# Patient Record
Sex: FEMALE | Race: BLACK OR AFRICAN AMERICAN | NOT HISPANIC OR LATINO | Employment: UNEMPLOYED | URBAN - METROPOLITAN AREA
[De-identification: names, ages, dates, MRNs, and addresses within clinical notes are randomized per-mention and may not be internally consistent; named-entity substitution may affect disease eponyms.]

---

## 2017-01-04 ENCOUNTER — ROUTINE PRENATAL (OUTPATIENT)
Dept: OBGYN | Facility: CLINIC | Age: 37
End: 2017-01-04
Payer: MEDICAID

## 2017-01-04 ENCOUNTER — APPOINTMENT (OUTPATIENT)
Dept: RADIOLOGY | Facility: IMAGING CENTER | Age: 37
End: 2017-01-04
Attending: NURSE PRACTITIONER
Payer: MEDICAID

## 2017-01-04 VITALS — BODY MASS INDEX: 38.68 KG/M2 | WEIGHT: 247 LBS | SYSTOLIC BLOOD PRESSURE: 116 MMHG | DIASTOLIC BLOOD PRESSURE: 58 MMHG

## 2017-01-04 DIAGNOSIS — Z34.82 ENCOUNTER FOR SUPERVISION OF OTHER NORMAL PREGNANCY IN SECOND TRIMESTER: ICD-10-CM

## 2017-01-04 DIAGNOSIS — Z34.01 ENCOUNTER FOR SUPERVISION OF NORMAL FIRST PREGNANCY IN FIRST TRIMESTER: ICD-10-CM

## 2017-01-04 PROCEDURE — 76805 OB US >/= 14 WKS SNGL FETUS: CPT | Mod: 26 | Performed by: OBSTETRICS & GYNECOLOGY

## 2017-01-04 PROCEDURE — 90040 PR PRENATAL FOLLOW UP: CPT | Performed by: OBSTETRICS & GYNECOLOGY

## 2017-01-04 NOTE — MR AVS SNAPSHOT
Annabel Archibald    2017 10:00 AM   Routine Prenatal   MRN: [id number]    [facility]   Dept Phone:  [phone]    Description:  Female : 1980   Provider:  Jen Grant M.D.           Allergies as of 2017     Allergen Noted Reactions    Peanut-Derived 2016         You were diagnosed with     Encounter for supervision of other normal pregnancy in second trimester   [[id number]]         Vital Signs     Blood Pressure Weight Last Menstrual Period Smoking Status          116/58 mmHg 112.038 kg (247 lb) 08/15/2016 Former Smoker        Basic Information     Date Of Birth Sex Race Ethnicity Preferred Language    1980 Female Black or  Non- English      Problem List              ICD-10-CM Priority Class Noted - Resolved    Supervision of high risk pregnancy, antepartum O09.90   2016 - Present    History of gestational diabetes in prior pregnancy, currently pregnant O09.299, Z86.32   2016 - Present      Health Maintenance        Date Due Completion Dates    IMM DTaP/Tdap/Td Vaccine (1 - Tdap) 1999 ---    IMM INFLUENZA (1) 2016 ---    PAP SMEAR 2019            Current Immunizations     No immunizations on file.      Below and/or attached are the medications your provider expects you to take. Review all of your home medications and newly ordered medications with your provider and/or pharmacist. Follow medication instructions as directed by your provider and/or pharmacist. Please keep your medication list with you and share with your provider. Update the information when medications are discontinued, doses are changed, or new medications (including over-the-counter products) are added; and carry medication information at all times in the event of emergency situations     Allergies:  PEANUT-DERIVED - (reactions not documented)               Medications  Valid as of: 2017 - 10:43 AM    Generic Name  Brand Name Tablet Size Instructions for use    Acetaminophen   Take  by mouth.        Doxylamine-Pyridoxine (Tablet Delayed Response) Doxylamine-Pyridoxine 10-10 MG Take 1 Tab by mouth 4 times a day as needed (nausea).        Oxycodone-Acetaminophen (Tab) PERCOCET 5-325 MG Take 1-2 Tabs by mouth every 6 hours as needed for Moderate Pain (pain).        .                 Medicines prescribed today were sent to:     [facility] [address] [place] [address] [place] - [address] [facility]    [address] [facility] [address]    Phone: [phone] Fax: [phone]    Open 24 Hours?: No      Medication refill instructions:       If your prescription bottle indicates you have medication refills left, it is not necessary to call your provider’s office. Please contact your pharmacy and they will refill your medication.    If your prescription bottle indicates you do not have any refills left, you may request refills at any time through one of the following ways: The online Dealer Ignition system (except Urgent Care), by calling your provider’s office, or by asking your pharmacy to contact your provider’s office with a refill request. Medication refills are processed only during regular business hours and may not be available until the next business day. Your provider may request additional information or to have a follow-up visit with you prior to refilling your medication.   *Please Note: Medication refills are assigned a new Rx number when refilled electronically. Your pharmacy may indicate that no refills were authorized even though a new prescription for the same medication is available at the pharmacy. Please request the medicine by name with the pharmacy before contacting your provider for a refill.           Dealer Ignition Access Code: [email or web address]  Expires: 1/5/2017  6:09 PM    Dealer Ignition  A secure, online tool to manage your health information     [employer]’s Dealer Ignition® is a secure, online tool that connects  you to your personalized health information from the privacy of your home -- day or night - making it very easy for you to manage your healthcare. Once the activation process is completed, you can even access your medical information using the SOHM tejas, which is available for free in the Apple Tejas store or Google Play store.     SOHM provides the following levels of access (as shown below):   My Chart Features   [facility] Doctor [facility]  Specialists [facility]  Urgent  Care Non-[facility]  Primary Care  Doctor   Email your healthcare team securely and privately 24/7 X X X    Manage appointments: schedule your next appointment; view details of past/upcoming appointments X      Request prescription refills. X      View recent personal medical records, including lab and immunizations X X X X   View health record, including health history, allergies, medications X X X X   Read reports about your outpatient visits, procedures, consult and ER notes X X X X   See your discharge summary, which is a recap of your hospital and/or ER visit that includes your diagnosis, lab results, and care plan. X X       How to register for SOHM:  1. Go to  [email or web address].  2. Click on the Sign Up Now box, which takes you to the New Member Sign Up page. You will need to provide the following information:  a. Enter your SOHM Access Code exactly as it appears at the top of this page. (You will not need to use this code after you’ve completed the sign-up process. If you do not sign up before the expiration date, you must request a new code.)   b. Enter your date of birth.   c. Enter your home email address.   d. Click Submit, and follow the next screen’s instructions.  3. Create a SOHM ID. This will be your SOHM login ID and cannot be changed, so think of one that is secure and easy to remember.  4. Create a SOHM password. You can change your password at any time.  5. Enter your Password Reset Question and  Answer. This can be used at a later time if you forget your password.   6. Enter your e-mail address. This allows you to receive e-mail notifications when new information is available in [employer].  7. Click Sign Up. You can now view your health information.    For assistance activating your [employer] account, call [phone]

## 2017-01-04 NOTE — PROGRESS NOTES
Pt here today for OB follow up  Denies FM  WT: 247 lb  BP: 116/58  Declines AFP  Pt states having lower back pain. States no other complaints.  Good # [phone]

## 2017-01-04 NOTE — PROGRESS NOTES
Annabel Bethea is a 36 y.o.  at 20w2d here today for obstetrical visit.  Patient is without complaints.    She reports good fetal movement.  She denies vaginal bleeding.  She denies rupture of membranes.  She denies contractions.     has Supervision of high risk pregnancy, antepartum and History of gestational diabetes in prior pregnancy, currently pregnant on her problem list.    Patient is complaining of continued problems with her back, she has seen neurosurgery and now has seen a second neurosurgeon who states they will perform back surgery on her the day that she has her baby  A told her that was probably unlikely but we will get the records from her new neurosurgery group [place]    A/P IUP at 20w2d  AFP declined  1 hour glucola   Rhogam b pos  GBS     F/U in 4 weeks

## 2017-01-17 ENCOUNTER — TELEPHONE (OUTPATIENT)
Dept: OBGYN | Facility: CLINIC | Age: 37
End: 2017-01-17

## 2017-01-17 NOTE — TELEPHONE ENCOUNTER
"Pt called stating she is having \"unbareable back pain\" due to her herniated discs and she is missing a lot of time from work so she would like to be put on disability so she does not lose her job. She says that her back doctor told her he will not do it until she has the surgery and he cannot do the surgery until after she delivers so he  sent her to her primary care to see if he would put her on disability but he told her to see if we will do it because she is pregnant. I consulted with Kandi Montaño and she said that the best we can do is schedule her follow up with an MD and see if they will do it. I called patient back and scheduled her with an MD.  "

## 2017-01-31 ENCOUNTER — ROUTINE PRENATAL (OUTPATIENT)
Dept: OBGYN | Facility: CLINIC | Age: 37
End: 2017-01-31
Payer: MEDICAID

## 2017-01-31 VITALS — BODY MASS INDEX: 38.05 KG/M2 | SYSTOLIC BLOOD PRESSURE: 122 MMHG | WEIGHT: 243 LBS | DIASTOLIC BLOOD PRESSURE: 76 MMHG

## 2017-01-31 NOTE — MR AVS SNAPSHOT
Annabel Bethea   2017 4:00 PM   Routine Prenatal   MRN: [id number]    [facility]   Dept Phone:  [phone]    Description:  Female : 1980   Provider:  Suze Ibrahim M.D.           Allergies as of 2017     Allergen Noted Reactions    Peanut-Derived 2016         Vital Signs     Blood Pressure Weight Last Menstrual Period Smoking Status          122/76 mmHg 110.224 kg (243 lb) 08/15/2016 Former Smoker        Basic Information     Date Of Birth Sex Race Ethnicity Preferred Language    1980 Female Black or  Non- English      Your appointments     2017  4:00 PM   OB Follow Up with Suze Ibrahim M.D.   [facility] [address]  [facility] [id number] [phone]              Problem List              ICD-10-CM Priority Class Noted - Resolved    Supervision of high risk pregnancy, antepartum O09.90   2016 - Present    History of gestational diabetes in prior pregnancy, currently pregnant O09.299, Z86.32   2016 - Present      Health Maintenance        Date Due Completion Dates    IMM DTaP/Tdap/Td Vaccine (1 - Tdap) 1999 ---    IMM INFLUENZA (1) 2016 ---    PAP SMEAR 2019            Current Immunizations     No immunizations on file.      Below and/or attached are the medications your provider expects you to take. Review all of your home medications and newly ordered medications with your provider and/or pharmacist. Follow medication instructions as directed by your provider and/or pharmacist. Please keep your medication list with you and share with your provider. Update the information when medications are discontinued, doses are changed, or new medications (including over-the-counter products) are added; and carry medication information at all times in the event of emergency situations     Allergies:  PEANUT-DERIVED - (reactions not  documented)               Medications  Valid as of: January 31, 2017 -  3:54 PM    Generic Name Brand Name Tablet Size Instructions for use    Acetaminophen   Take  by mouth.        Doxylamine-Pyridoxine (Tablet Delayed Response) Doxylamine-Pyridoxine 10-10 MG Take 1 Tab by mouth 4 times a day as needed (nausea).        Oxycodone-Acetaminophen (Tab) PERCOCET 5-325 MG Take 1-2 Tabs by mouth every 6 hours as needed for Moderate Pain (pain).        .                 Medicines prescribed today were sent to:     [facility] [address] [facility], [place] - [address] [facility]    [address] [facility] [place] [address]    Phone: [phone] Fax: [phone]    Open 24 Hours?: No      Medication refill instructions:       If your prescription bottle indicates you have medication refills left, it is not necessary to call your provider’s office. Please contact your pharmacy and they will refill your medication.    If your prescription bottle indicates you do not have any refills left, you may request refills at any time through one of the following ways: The online [employer] system (except Urgent Care), by calling your provider’s office, or by asking your pharmacy to contact your provider’s office with a refill request. Medication refills are processed only during regular business hours and may not be available until the next business day. Your provider may request additional information or to have a follow-up visit with you prior to refilling your medication.   *Please Note: Medication refills are assigned a new Rx number when refilled electronically. Your pharmacy may indicate that no refills were authorized even though a new prescription for the same medication is available at the pharmacy. Please request the medicine by name with the pharmacy before contacting your provider for a refill.           [employer] Access Code: [email or web address]  Expires: 3/2/2017  3:05 PM    [employer]  A secure, online tool to  manage your health information     [employer]’s Cogeco Cable® is a secure, online tool that connects you to your personalized health information from the privacy of your home -- day or night - making it very easy for you to manage your healthcare. Once the activation process is completed, you can even access your medical information using the Cogeco Cable tejas, which is available for free in the Apple Tejas store or Google Play store.     Cogeco [employer] provides the following levels of access (as shown below):   My Chart Features   [facility] Doctor [facility] [place]  Specialists [facility]  Urgent  Care Non-[facility]  Primary Care  Doctor   Email your healthcare team securely and privately 24/7 X X X    Manage appointments: schedule your next appointment; view details of past/upcoming appointments X      Request prescription refills. X      View recent personal medical records, including lab and immunizations X X X X   View health record, including health history, allergies, medications X X X X   Read reports about your outpatient visits, procedures, consult and ER notes X X X X   See your discharge summary, which is a recap of your hospital and/or ER visit that includes your diagnosis, lab results, and care plan. X X       How to register for [employer]:  1. Go to  [email or web address].  2. Click on the Sign Up Now box, which takes you to the New Member Sign Up page. You will need to provide the following information:  a. Enter your Cog[employer]o [employer] Access Code exactly as it appears at the top of this page. (You will not need to use this code after you’ve completed the sign-up process. If you do not sign up before the expiration date, you must request a new code.)   b. Enter your date of birth.   c. Enter your home email address.   d. Click Submit, and follow the next screen’s instructions.  3. Create a Cog[employer]o Cable ID. This will be your Cog[employer] login ID and cannot be changed, so think of one that is secure and easy to remember.  4. Create a MotorwayBuddyt  password. You can change your password at any time.  5. Enter your Password Reset Question and Answer. This can be used at a later time if you forget your password.   6. Enter your e-mail address. This allows you to receive e-mail notifications when new information is available in [employer].  7. Click Sign Up. You can now view your health information.    For assistance activating your Sk[employer]Bridge account, call [phone]

## 2017-02-01 ENCOUNTER — TELEPHONE (OUTPATIENT)
Dept: OBGYN | Facility: CLINIC | Age: 37
End: 2017-02-01

## 2017-02-01 NOTE — TELEPHONE ENCOUNTER
[facility]   CLINIC: [facility]  CALL TYPE: General Office Message  TO: /Office  NM: Annabel Bethea   PH: [phone]   PT NM: Annabel Bethea   : 80   REG DR:    RE: Patient is 6 months pregnant, and  has been experiencing a lot of  vomiting. She was put through to the  [facility] NurseUmBios Hotline.   DISP HIST: 2017 04:39P [facility]  [facility] cllr to [facility] BlogBuss Hotline    --------------------------------------  Message History  Account: [id number]  Taken:  2017  4:39p [facility]  Serial#: [id number]  Delivered To:                           ===========[phone]=================    17 1051 Left message for pt to call back if she has more questions.

## 2017-02-06 ENCOUNTER — TELEPHONE (OUTPATIENT)
Dept: OBGYN | Facility: CLINIC | Age: 37
End: 2017-02-06

## 2017-02-06 NOTE — TELEPHONE ENCOUNTER
"Pt called stating that she is in a lot of pain because of her \"herniated dics\" and her neurologist took her off steroids because it can harm the baby and she is already taking Norco and tylenol asking what can she take for pain. I consulted with Nay Coyle midwife and she said to tell pt that we are not going to give her different medications than her neurologist has given her. I called pt to let her know and got her VM left an message for pt to call back if she still had questions.  "

## 2017-02-07 ENCOUNTER — HOSPITAL ENCOUNTER (EMERGENCY)
Facility: MEDICAL CENTER | Age: 37
End: 2017-02-07
Attending: EMERGENCY MEDICINE
Payer: MEDICAID

## 2017-02-07 ENCOUNTER — APPOINTMENT (OUTPATIENT)
Dept: RADIOLOGY | Facility: MEDICAL CENTER | Age: 37
End: 2017-02-07
Attending: EMERGENCY MEDICINE
Payer: MEDICAID

## 2017-02-07 VITALS
HEART RATE: 92 BPM | SYSTOLIC BLOOD PRESSURE: 116 MMHG | RESPIRATION RATE: 20 BRPM | DIASTOLIC BLOOD PRESSURE: 90 MMHG | WEIGHT: 244.49 LBS | BODY MASS INDEX: 38.37 KG/M2 | HEIGHT: 67 IN

## 2017-02-07 DIAGNOSIS — Z34.90 PREGNANCY, UNSPECIFIED GESTATIONAL AGE: ICD-10-CM

## 2017-02-07 DIAGNOSIS — R11.2 NON-INTRACTABLE VOMITING WITH NAUSEA, UNSPECIFIED VOMITING TYPE: ICD-10-CM

## 2017-02-07 DIAGNOSIS — F41.9 ANXIETY: ICD-10-CM

## 2017-02-07 PROCEDURE — 700111 HCHG RX REV CODE 636 W/ 250 OVERRIDE (IP): Performed by: EMERGENCY MEDICINE

## 2017-02-07 PROCEDURE — 99283 EMERGENCY DEPT VISIT LOW MDM: CPT

## 2017-02-07 PROCEDURE — 76815 OB US LIMITED FETUS(S): CPT

## 2017-02-07 RX ORDER — ONDANSETRON 4 MG/1
4 TABLET, FILM COATED ORAL EVERY 4 HOURS PRN
Qty: 20 TAB | Refills: 0 | Status: ON HOLD | OUTPATIENT
Start: 2017-02-07 | End: 2017-05-15

## 2017-02-07 RX ORDER — BISACODYL 5 MG/1
10 TABLET, DELAYED RELEASE ORAL PRN
Qty: 30 TAB | Refills: 0 | Status: ON HOLD | OUTPATIENT
Start: 2017-02-07 | End: 2017-05-15

## 2017-02-07 RX ORDER — ONDANSETRON 4 MG/1
4 TABLET, ORALLY DISINTEGRATING ORAL ONCE
Status: COMPLETED | OUTPATIENT
Start: 2017-02-07 | End: 2017-02-07

## 2017-02-07 RX ADMIN — ONDANSETRON 4 MG: 4 TABLET, ORALLY DISINTEGRATING ORAL at 14:13

## 2017-02-07 ASSESSMENT — PAIN SCALES - WONG BAKER: WONGBAKER_NUMERICALRESPONSE: DOESN'T HURT AT ALL

## 2017-02-07 NOTE — DISCHARGE INSTRUCTIONS
ABCs of Pregnancy  A  Antepartum care is very important. Be sure you see your doctor and get prenatal care as soon as you think you are pregnant. At this time, you will be tested for infection, genetic abnormalities and potential problems with you and the pregnancy. This is the time to discuss diet, exercise, work, medications, labor, pain medication during labor and the possibility of a  delivery. Ask any questions that may concern you. It is important to see your doctor regularly throughout your pregnancy. Avoid exposure to toxic substances and chemicals - such as cleaning solvents, lead and mercury, some insecticides, and paint. Pregnant women should avoid exposure to paint fumes, and fumes that cause you to feel ill, dizzy or faint. When possible, it is a good idea to have a pre-pregnancy consultation with your caregiver to begin some important recommendations your caregiver suggests such as, taking folic acid, exercising, quitting smoking, avoiding alcoholic beverages, etc.  B  Breastfeeding is the healthiest choice for both you and your baby. It has many nutritional benefits for the baby and health benefits for the mother. It also creates a very tight and loving bond between the baby and mother. Talk to your doctor, your family and friends, and your employer about how you choose to feed your baby and how they can support you in your decision. Not all birth defects can be prevented, but a woman can take actions that may increase her chance of having a healthy baby. Many birth defects happen very early in pregnancy, sometimes before a woman even knows she is pregnant. Birth defects or abnormalities of any child in your or the father's family should be discussed with your caregiver. Get a good support bra as your breast size changes. Wear it especially when you exercise and when nursing.   C  Celebrate the news of your pregnancy with the your spouse/father and family. Childbirth classes are helpful to  take for you and the spouse/father because it helps to understand what happens during the pregnancy, labor and delivery.  delivery should be discussed with your doctor so you are prepared for that possibility. The pros and cons of circumcision if it is a boy, should be discussed with your pediatrician. Cigarette smoking during pregnancy can result in low birth weight babies. It has been associated with infertility, miscarriages, tubal pregnancies, infant death (mortality) and poor health (morbidity) in childhood. Additionally, cigarette smoking may cause long-term learning disabilities. If you smoke, you should try to quit before getting pregnant and not smoke during the pregnancy. Secondary smoke may also harm a mother and her developing baby. It is a good idea to ask people to stop smoking around you during your pregnancy and after the baby is born. Extra calcium is necessary when you are pregnant and is found in your prenatal vitamin, in dairy products, green leafy vegetables and in calcium supplements.  D  A healthy diet according to your current weight and height, along with vitamins and mineral supplements should be discussed with your caregiver. Domestic abuse or violence should be made known to your doctor right away to get the situation corrected. Drink more water when you exercise to keep hydrated. Discomfort of your back and legs usually develops and progresses from the middle of the second trimester through to delivery of the baby. This is because of the enlarging baby and uterus, which may also affect your balance. Do not take illegal drugs. Illegal drugs can seriously harm the baby and you. Drink extra fluids (water is best) throughout pregnancy to help your body keep up with the increases in your blood volume. Drink at least 6 to 8 glasses of water, fruit juice, or milk each day. A good way to know you are drinking enough fluid is when your urine looks almost like clear water or is very light  yellow.   E  Eat healthy to get the nutrients you and your unborn baby need. Your meals should include the five basic food groups. Exercise (30 minutes of light to moderate exercise a day) is important and encouraged during pregnancy, if there are no medical problems or problems with the pregnancy. Exercise that causes discomfort or dizziness should be stopped and reported to your caregiver. Emotions during pregnancy can change from being ecstatic to depression and should be understood by you, your partner and your family.  F  Fetal screening with ultrasound, amniocentesis and monitoring during pregnancy and labor is common and sometimes necessary. Take 400 micrograms of folic acid daily both before, when possible, and during the first few months of pregnancy to reduce the risk of birth defects of the brain and spine. All women who could possibly become pregnant should take a vitamin with folic acid, every day. It is also important to eat a healthy diet with fortified foods (enriched grain products, including cereals, rice, breads, and pastas) and foods with natural sources of folate (orange juice, green leafy vegetables, beans, peanuts, broccoli, asparagus, peas, and lentils). The father should be involved with all aspects of the pregnancy including, the prenatal care, childbirth classes, labor, delivery, and postpartum time. Fathers may also have emotional concerns about being a father, financial needs, and raising a family.  G  Genetic testing should be done appropriately. It is important to know your family and the father's history. If there have been problems with pregnancies or birth defects in your family, report these to your doctor. Also, genetic counselors can talk with you about the information you might need in making decisions about having a family. You can call a major medical center in your area for help in finding a board-certified genetic counselor. Genetic testing and counseling should be done  before pregnancy when possible, especially if there is a history of problems in the mother's or father's family. Certain ethnic backgrounds are more at risk for genetic defects.  H  Get familiar with the hospital where you will be having your baby. Get to know how long it takes to get there, the labor and delivery area, and the hospital procedures. Be sure your medical insurance is accepted there. Get your home ready for the baby including, clothes, the baby's room (when possible), furniture and car seat. Hand washing is important throughout the day, especially after handling raw meat and poultry, changing the baby's diaper or using the bathroom. This can help prevent the spread of many bacteria and viruses that cause infection. Your hair may become dry and thinner, but will return to normal a few weeks after the baby is born. Heartburn is a common problem that can be treated by taking antacids recommended by your caregiver, eating smaller meals 5 or 6 times a day, not drinking liquids when eating, drinking between meals and raising the head of your bed 2 to 3 inches.  I  Insurance to cover you, the baby, doctor and hospital should be reviewed so that you will be prepared to pay any costs not covered by your insurance plan. If you do not have medical insurance, there are usually clinics and services available for you in your community. Take 30 milligrams of iron during your pregnancy as prescribed by your doctor to reduce the risk of low red blood cells (anemia) later in pregnancy. All women of childbearing age should eat a diet rich in iron.  J  There should be a joint effort for the mother, father and any other children to adapt to the pregnancy financially, emotionally, and psychologically during the pregnancy. Join a support group for moms-to-be. Or, join a class on parenting or childbirth. Have the family participate when possible.  K  Know your limits. Let your caregiver know if you experience any of the  following:   · Pain of any kind.  · Strong cramps.  · You develop a lot of weight in a short period of time (5 pounds in 3 to 5 days).  · Vaginal bleeding, leaking of amniotic fluid.  · Headache, vision problems.  · Dizziness, fainting, shortness of breath.  · Chest pain.  · Fever of 102° F (38.9° C) or higher.  · Gush of clear fluid from your vagina.  · Painful urination.  · Domestic violence.  · Irregular heartbeat (palpitations).  · Rapid beating of the heart (tachycardia).  · Constant feeling sick to your stomach (nauseous) and vomiting.  · Trouble walking, fluid retention (edema).  · Muscle weakness.  · If your baby has decreased activity.  · Persistent diarrhea.  · Abnormal vaginal discharge.  · Uterine contractions at 20-minute intervals.  · Back pain that travels down your leg.  L  Learn and practice that what you eat and drink should be in moderation and healthy for you and your baby. Legal drugs such as alcohol and caffeine are important issues for pregnant women. There is no safe amount of alcohol a woman can drink while pregnant. Fetal alcohol syndrome, a disorder characterized by growth retardation, facial abnormalities, and central nervous system dysfunction, is caused by a woman's use of alcohol during pregnancy. Caffeine, found in tea, coffee, soft drinks and chocolate, should also be limited. Be sure to read labels when trying to cut down on caffeine during pregnancy. More than 200 foods, beverages, and over-the-counter medications contain caffeine and have a high salt content! There are coffees and teas that do not contain caffeine.  M  Medical conditions such as diabetes, epilepsy, and high blood pressure should be treated and kept under control before pregnancy when possible, but especially during pregnancy. Ask your caregiver about any medications that may need to be changed or adjusted during pregnancy. If you are currently taking any medications, ask your caregiver if it is safe to take them  while you are pregnant or before getting pregnant when possible. Also, be sure to discuss any herbs or vitamins you are taking. They are medicines, too! Discuss with your doctor all medications, prescribed and over-the-counter, that you are taking. During your prenatal visit, discuss the medications your doctor may give you during labor and delivery.  N  Never be afraid to ask your doctor or caregiver questions about your health, the progress of the pregnancy, family problems, stressful situations, and recommendation for a pediatrician, if you do not have one. It is better to take all precautions and discuss any questions or concerns you may have during your office visits. It is a good idea to write down your questions before you visit the doctor.  O  Over-the-counter cough and cold remedies may contain alcohol or other ingredients that should be avoided during pregnancy. Ask your caregiver about prescription, herbs or over-the-counter medications that you are taking or may consider taking while pregnant.   P  Physical activity during pregnancy can benefit both you and your baby by lessening discomfort and fatigue, providing a sense of well-being, and increasing the likelihood of early recovery after delivery. Light to moderate exercise during pregnancy strengthens the belly (abdominal) and back muscles. This helps improve posture. Practicing yoga, walking, swimming, and cycling on a stationary bicycle are usually safe exercises for pregnant women. Avoid scuba diving, exercise at high altitudes (over 3000 feet), skiing, horseback riding, contact sports, etc. Always check with your doctor before beginning any kind of exercise, especially during pregnancy and especially if you did not exercise before getting pregnant.  Q  Queasiness, stomach upset and morning sickness are common during pregnancy. Eating a couple of crackers or dry toast before getting out of bed. Foods that you normally love may make you feel sick to  your stomach. You may need to substitute other nutritious foods. Eating 5 or 6 small meals a day instead of 3 large ones may make you feel better. Do not drink with your meals, drink between meals. Questions that you have should be written down and asked during your prenatal visits.  R  Read about and make plans to baby-proof your home. There are important tips for making your home a safer environment for your baby. Review the tips and make your home safer for you and your baby. Read food labels regarding calories, salt and fat content in the food.  S  Saunas, hot tubs, and steam rooms should be avoided while you are pregnant. Excessive high heat may be harmful during your pregnancy. Your caregiver will screen and examine you for sexually transmitted diseases and genetic disorders during your prenatal visits. Learn the signs of labor. Sexual relations while pregnant is safe unless there is a medical or pregnancy problem and your caregiver advises against it.  T  Traveling long distances should be avoided especially in the third trimester of your pregnancy. If you do have to travel out of state, be sure to take a copy of your medical records and medical insurance plan with you. You should not travel long distances without seeing your doctor first. Most airlines will not allow you to travel after 36 weeks of pregnancy. Toxoplasmosis is an infection caused by a parasite that can seriously harm an unborn baby. Avoid eating undercooked meat and handling cat litter. Be sure to wear gloves when gardening. Tingling of the hands and fingers is not unusual and is due to fluid retention. This will go away after the baby is born.  U  Womb (uterus) size increases during the first trimester. Your kidneys will begin to function more efficiently. This may cause you to feel the need to urinate more often. You may also leak urine when sneezing, coughing or laughing. This is due to the growing uterus pressing against your bladder,  which lies directly in front of and slightly under the uterus during the first few months of pregnancy. If you experience burning along with frequency of urination or bloody urine, be sure to tell your doctor. The size of your uterus in the third trimester may cause a problem with your balance. It is advisable to maintain good posture and avoid wearing high heels during this time. An ultrasound of your baby may be necessary during your pregnancy and is safe for you and your baby.  V  Vaccinations are an important concern for pregnant women. Get needed vaccines before pregnancy. Center for Disease Control (www.cdc.gov) has clear guidelines for the use of vaccines during pregnancy. Review the list, be sure to discuss it with your doctor. Prenatal vitamins are helpful and healthy for you and the baby. Do not take extra vitamins except what is recommended. Taking too much of certain vitamins can cause overdose problems. Continuous vomiting should be reported to your caregiver. Varicose veins may appear especially if there is a family history of varicose veins. They should subside after the delivery of the baby. Support hose helps if there is leg discomfort.  W  Being overweight or underweight during pregnancy may cause problems. Try to get within 15 pounds of your ideal weight before pregnancy. Remember, pregnancy is not a time to be dieting! Do not stop eating or start skipping meals as your weight increases. Both you and your baby need the calories and nutrition you receive from a healthy diet. Be sure to consult with your doctor about your diet. There is a formula and diet plan available depending on whether you are overweight or underweight. Your caregiver or nutritionist can help and advise you if necessary.  X  Avoid X-rays. If you must have dental work or diagnostic tests, tell your dentist or physician that you are pregnant so that extra care can be taken. X-rays should only be taken when the risks of not taking  them outweigh the risk of taking them. If needed, only the minimum amount of radiation should be used. When X-rays are necessary, protective lead shields should be used to cover areas of the body that are not being X-rayed.  Y  Your baby loves you. Breastfeeding your baby creates a loving and very close bond between the two of you. Give your baby a healthy environment to live in while you are pregnant. Infants and children require constant care and guidance. Their health and safety should be carefully watched at all times. After the baby is born, rest or take a nap when the baby is sleeping.  Z  Get your ZZZs. Be sure to get plenty of rest. Resting on your side as often as possible, especially on your left side is advised. It provides the best circulation to your baby and helps reduce swelling. Try taking a nap for 30 to 45 minutes in the afternoon when possible. After the baby is born rest or take a nap when the baby is sleeping. Try elevating your feet for that amount of time when possible. It helps the circulation in your legs and helps reduce swelling.   Most information courtesy of the CDC.  Document Released: 12/18/2006 Document Revised: 03/11/2013 Document Reviewed: 09/01/2010  [employer]® Patient Information ©2014 [employer].    Eating Plan for Pregnant Women  While you are pregnant, your body will require additional nutrition to help support your growing baby. It is recommended that you consume:  · 150 additional calories each day during your first trimester.  · 300 additional calories each day during your second trimester.  · 300 additional calories each day during your third trimester.  Eating a healthy, well-balanced diet is very important for your health and for your baby's health. You also have a higher need for some vitamins and minerals, such as folic acid, calcium, iron, and vitamin D.  WHAT DO I NEED TO KNOW ABOUT EATING DURING PREGNANCY?  · Do not try to lose weight or go on a diet during  "pregnancy.  · Choose healthy, nutritious foods. Choose ½ of a sandwich with a glass of milk instead of a candy bar or a high-calorie sugar-sweetened beverage.  · Limit your overall intake of foods that have \"empty calories.\" These are foods that have little nutritional value, such as sweets, desserts, candies, sugar-sweetened beverages, and fried foods.  · Eat a variety of foods, especially fruits and vegetables.  · Take a prenatal vitamin to help meet the additional needs during pregnancy, specifically for folic acid, iron, calcium, and vitamin D.  · Remember to stay active. Ask your health care provider for exercise recommendations that are specific to you.  · Practice good food safety and cleanliness, such as washing your hands before you eat and after you prepare raw meat. This helps to prevent foodborne illnesses, such as listeriosis, that can be very dangerous for your baby. Ask your health care provider for more information about listeriosis.  WHAT DOES 150 EXTRA CALORIES LOOK LIKE?  Healthy options for an additional 150 calories each day could be any of the following:  · Plain low-fat yogurt (6-8 oz) with ½ cup of berries.  · 1 apple with 2 teaspoons of peanut butter.  · Cut-up vegetables with ¼ cup of hummus.  · Low-fat chocolate milk (8 oz or 1 cup).  · 1 string cheese with 1 medium orange.  · ½ of a peanut butter and jelly sandwich on whole-wheat bread (1 tsp of peanut butter).  For 300 calories, you could eat two of those healthy options each day.   WHAT IS A HEALTHY AMOUNT OF WEIGHT TO GAIN?  The recommended amount of weight for you to gain is based on your pre-pregnancy BMI. If your pre-pregnancy BMI was:  · Less than 18 (underweight), you should gain 28-40 lb.  · 18-24.9 (normal), you should gain 25-35 lb.  · 25-29.9 (overweight), you should gain 15-25 lb.  · Greater than 30 (obese), you should gain 11-20 lb.  WHAT IF I AM HAVING TWINS OR MULTIPLES?  Generally, pregnant women who will be having twins " "or multiples may need to increase their daily calories by 300-600 calories each day. The recommended range for total weight gain is 25-54 lb, depending on your pre-pregnancy BMI. Talk with your health care provider for specific guidance about additional nutritional needs, weight gain, and exercise during your pregnancy.  WHAT FOODS CAN I EAT?  Grains  Any grains. Try to choose whole grains, such as whole-wheat bread, oatmeal, or brown rice.  Vegetables  Any vegetables. Try to eat a variety of colors and types of vegetables to get a full range of vitamins and minerals. Remember to wash your vegetables well before eating.  Fruits  Any fruits. Try to eat a variety of colors and types of fruit to get a full range of vitamins and minerals. Remember to wash your fruits well before eating.  Meats and Other Protein Sources  Lean meats, including chicken, turkey, fish, and lean cuts of beef, veal, or pork. Make sure that all meats are cooked to \"well done.\" Tofu. Tempeh. Beans. Eggs. Peanut butter and other nut butters. Seafood, such as shrimp, crab, and lobster. If you choose fish, select types that are higher in omega-3 fatty acids, including salmon, herring, mussels, trout, sardines, and pollock. Make sure that all meats are cooked to food-safe temperatures.  Dairy  Pasteurized milk and milk alternatives. Pasteurized yogurt and pasteurized cheese. Cottage cheese. Sour cream.  Beverages  Water. Juices that contain 100% fruit juice or vegetable juice. Caffeine-free teas and decaffeinated coffee. Drinks that contain caffeine are okay to drink, but it is better to avoid caffeine. Keep your total caffeine intake to less than 200 mg each day (12 oz of coffee, tea, or soda) or as directed by your health care provider.  Condiments  Any pasteurized condiments.  Sweets and Desserts  Any sweets and desserts.  Fats and Oils  Any fats and oils.  The items listed above may not be a complete list of recommended foods or beverages. " Contact your dietitian for more options.  WHAT FOODS ARE NOT RECOMMENDED?  Vegetables  Unpasteurized (raw) vegetable juices.  Fruits  Unpasteurized (raw) fruit juices.  Meats and Other Protein Sources  Cured meats that have nitrates, such as hernandez, salami, and hotdogs. Luncheon meats, bologna, or other deli meats (unless they are reheated until they are steaming hot). Refrigerated [place], meat spreads from a meat counter, smoked seafood that is found in the refrigerated section of a store. Raw fish, such as sushi or sashimi. High mercury content fish, such as tilefish, shark, swordfish, and jose mackerel. Raw meats, such as tuna or beef tartare. Undercooked meats and poultry. Make sure that all meats are cooked to food-safe temperatures.  Dairy  Unpasteurized (raw) milk and any foods that have raw milk in them. Soft cheeses, such as feta, queso guadalupe, queso fresco, Brie, Camembert cheeses, blue-veined cheeses, and Panela cheese (unless it is made with pasteurized milk, which must be stated on the label).  Beverages  Alcohol. Sugar-sweetened beverages, such as sodas, teas, or energy drinks.  Condiments  Homemade fermented foods and drinks, such as pickles, sauerkraut, or kombucha drinks. (Store-bought pasteurized versions of these are okay.)  Other  Salads that are made in the store, such as ham salad, chicken salad, egg salad, tuna salad, and seafood salad.  The items listed above may not be a complete list of foods and beverages to avoid. Contact your dietitian for more information.     This information is not intended to replace advice given to you by your health care provider. Make sure you discuss any questions you have with your health care provider.     Document Released: 10/02/2015 Document Reviewed: 10/02/2015  [employer] Interactive Patient Education ©2016 [employer]  Ansiedad y crisis de angustia  (Anxiety and Panic Attacks)  El profesional que lo asiste le umana informado que usted padece ansiedad o crisis  de angustia. Roya trastorno puede presentarse de varias formas. La mayor parte de las veces las crisis aparecen de modo repentino y sin aviso. Se producen en cualquier momento del día, incluso pilar el sueño, y en cualquier etapa de la dalila. Pueden ser muy intensas e inexplicadas. Aunque un ataque de pánico puede ser muy atemorizante, no produce daños físicos. Algunas veces se desconoce la causa de la ansiedad. La ansiedad es un mecanismo protector del organismo en somers respuesta de romain o escape. Muchas de estas situaciones de percepción de peligro son en realidad situaciones no físicas (ben la ansiedad de perder el trabajo).  CAUSAS  Las causas de la ansiedad o de un ataque de pánico pueden ser muchas. Los ataques de pánico pueden ocurrir en personas sanas en patricia serie de circunstancias. Es posible que haya patricia causa genética de los ataques de pánico. Algunos medicamentos pueden provocar ansiedad ben efecto secundario.  SÍNTOMAS  Algunas de las sensaciones más comunes son:  · Terror intenso.  · Vahídos, desfallecimiento.  · Golpes de frío y calor.  · Temor a enloquecer.  · Sentimiento de irrealidad.  · Sudoración.  · Temblores.  · Dolor en el pecho y latidos irregulares (palpitaciones).  · Sensaciones de ahogo o sofocos.  · Sentimiento de peligro inminente y de que la muerte está próxima.  · Hormigueo en las extremidades que puede venir de la respiración agitada.  · Alteración de la realidad (desrealización).  · Sentirse separado de taylor mismo (despersonalización).  Estos son los síntomas (problemas) más comunes y pueden combinarse para presentar la crisis de angustia.   DIAGNÓSTICO  La evaluación que realice el profesional dependerá del tipo de síntomas que esté experimentando. El diagnóstico de la ansiedad o de ataque de pánico se realiza cuando no se encuentra ninguna enfermedad física que pueda determinarse ben la causa de los síntomas.  TRATAMIENTO  El tratamiento para prevenir la ansiedad y los ataques de  pánico incluye:  · Evitar las circunstancias que causen ansiedad.  · Reaseguro y relajación.  · Ejercicio regular.  · Terapias de relajación, ben yoga.  · Psicoterapia con un psiquiatra o terapeuta.  · Evitar la cafeína, el alcohol y las drogas ilegales.  · Medicamentos de prescripción.  SOLICITE ATENCIÓN MÉDICA DE INMEDIATO SI:  · Usted experimenta síntomas de ataque de pánico que son distintos a bianca síntomas usuales.  · Tiene cualquier síntoma que empeora o lo preocupa.  Document Released: 12/18/2006 Document Revised: 03/11/2013  [employer]® Patient Information ©2014 [employer].

## 2017-02-07 NOTE — ED AVS SNAPSHOT
2/7/2017          Annabel Bethea  [address]  [place] [address]    Dear Annabel:    [facility] wants to ensure your discharge home is safe and you or your loved ones have had all your questions answered regarding your care after you leave the hospital.    You may receive a telephone call within two days of your discharge.  This call is to make certain you understand your discharge instructions as well as ensure we provided you with the best care possible during your stay with us.     The call will only last approximately 3-5 minutes and will be done by a nurse.    Once again, we want to ensure your discharge home is safe and that you have a clear understanding of any next steps in your care.  If you have any questions or concerns, please do not hesitate to contact us, we are here for you.  Thank you for choosing [facility] for your healthcare needs.    Sincerely,    Alek Marquez    [facility]

## 2017-02-07 NOTE — ED PROVIDER NOTES
CHIEF COMPLAINT  Chief Complaint   Patient presents with   • Anxiety   • Pregnancy     x6mo pregn       HPI  Annabel Bethea is a 36 y.o. pregnant female who was brought by [employer] from work after she c/o anxiety, SOB, nausea/vomiting and palpitation. Pt is  and six months into her pregnancy w/ EDVIN on 2017. Patient reports she was diagnosed w/ Sciatica 6 months ago and her neurologist ordered MRI which showed 2 herniated disks and Neurologist started pt on steroid and Norco 10mg. Patient was initially on prednisone then switched to Methylprednisolone. Neurologist stopped steroid 1 wk ago and pt was told steroid could cause harm to her baby if she continues taking steroid. Since then, patient has been having nausea and vomiting for 1wk. Today, she started feeling palpitation, anxiety, feeling like can't breathe and abdominal pain with vomiting. She also has occasional headache and dizziness. She wants to know her baby is ok. She is still able to feel baby movement but somewhat less active. Denies vaginal discharge/bleeding. She goes to pregnancy center for regular OB visits. She denies recent trauma and [facility] check ups.    REVIEW OF SYSTEMS  Pertinent positives include: nausea/vomiting, SOB, palpitation, constipation for past 3 days  Pertinent negatives include fever/chills, trauma, vaginal bleeding/discharge, urinary symptoms including dysuria/burning sensation  All other systems are negative.      PAST MEDICAL HISTORY  Past Medical History   Diagnosis Date   • Allergy    • Anxiety    • Diabetes (CMS-HCC)      GDM last two pregnancy's   • Stroke (CMS-HCC)    • Sciatic pain        FAMILY HISTORY  Family History   Problem Relation Age of Onset   • Diabetes Paternal Grandmother    • Cancer Paternal Grandmother      breast   • Hypertension Paternal Grandmother    • Stroke Paternal Grandmother    • Other Paternal Grandmother      gout   • Arthritis  "Paternal Grandmother        SOCIAL HISTORY  Social History   Substance Use Topics   • Smoking status: Former Smoker -- 0.25 packs/day     Types: Cigarettes     Quit date: 10/01/2016   • Smokeless tobacco: Former User     Quit date: 09/28/2016      Comment: Pt. states last smoked when she found out she was pregnant.    • Alcohol Use: No     History   Drug Use   • Yes   • Special: Marijuana     Comment: Pt. states last used 2 months ago. states medical        SURGICAL HISTORY  Past Surgical History   Procedure Laterality Date   • Cholecystectomy         CURRENT MEDICATIONS  Home Medications     **Home medications have not yet been reviewed for this encounter**          ALLERGIES  Allergies   Allergen Reactions   • Peanut-Derived        PHYSICAL EXAM  VITAL SIGNS: /90 mmHg  Pulse 92  Resp 20  Ht 1.702 m (5' 7\")  Wt 110.9 kg (244 lb 7.8 oz)  BMI 38.28 kg/m2  LMP 08/15/2016 Reviewed and stable  Constitutional: NAD, appeared slightly worried  HENT: PERRL, EOM intact, no scleral icterus  Cardiovascular: RRR, normal S1S2, no murmur  Respiratory: CTA b/l  Gastrointestinal: +BS, soft, NT/ND, no guarding/rigidity/rebound, fundal height above the umbilicus puncture 2 cm  Skin: not cyanotic and no visible rash  Genitourinary:  deferred  Neurologic: aox4, able to move all extremities  Psychiatric: appeared anxious, normal judgement and normal affect  Extremities: no pitting edema b/l, nontender    DIFFERENTIAL DIAGNOSIS:  Anxiety  Pregnancy  Hyperemesis gravidarum    EKG  Not performed    RADIOLOGY/PROCEDURES  US-OB LIMITED TRANSABDOMINAL   Final Result      Active single intrauterine pregnancy of an estimated gestational age of Average 24w 5d with an estimated date of delivery of 5/25/2017.      Polyhydramnios, 22.5 cm. January comparison volume was 18.6 cm          LABORATORY: Reviewed as below.    INTERVENTIONS:  Medications   ondansetron (ZOFRAN ODT) dispertab 4 mg (4 mg Oral Given 2/7/17 5305)     Response: " patient reports symptomatic improvement after receiving Zofran, informing her this was likely due to anxiety and that her fetal US showed active fetus.    COURSE & MEDICAL DECISION MAKING    Patient was seen and evaluated at bedside. Physical exam benign except patient appeared anxious. Patient was informed that her symptoms of palpitation, SOB are most likely due to anxiety and nausea/vomiting are likely due to pregnancy. Patient reported improvement in her symptoms and was asking for sandwich during this ED visit. She showed sight of relieve after fetal US showed active fetus. Will not pursue PE workup although pt is a high risk given her pregnancy status as patient has no leg pain, and vital signs are stable w/ normal oxygenation, RR, HR and BP. Patient needs to follow up with her OB Dr. Martin. Patient will be discharged on stable condition w/ prescriptions for Zofran and Dulcolax. She is to follow with [facility] she has increasing symptomatology as he do not have gynecology/OB on call this facility  The patient is a hypoxic, tachypneic or tachycardic, she has noted to rest her distress or chest pain and do not believe she is expressing a pulmonary embolism. Should return precautions have been given.      Current Outpatient Prescriptions   Medication Sig Dispense Refill   • ondansetron (ZOFRAN) 4 MG Tab tablet Take 1 Tab by mouth every four hours as needed. 20 Tab 0   • bisacodyl (DULCOLAX) 5 MG EC tablet Take 2 Tabs by mouth as needed. 30 Tab 0   • Acetaminophen (TYLENOL EXTRA STRENGTH PO) Take  by mouth.     • Doxylamine-Pyridoxine 10-10 MG Tablet Delayed Response delayed-release tablet Take 1 Tab by mouth 4 times a day as needed (nausea). 60 Tab 0   • oxycodone-acetaminophen (PERCOCET) 5-325 MG Tab Take 1-2 Tabs by mouth every 6 hours as needed for Moderate Pain (pain). 20 Tab 0    I have personally evaluated the patient, reviewed the laboratory and radiological tests results. I discussed  the patient with the resident, discussed the diagnosis and disposition with the patient.      CONDITION: stable    FINAL IMPRESSION  1. Non-intractable vomiting with nausea, unspecified vomiting type    2. Pregnancy, unspecified gestational age    3. Anxiety       The patient will return for new or worsening symptoms and is stable at the time of discharge.    The patient is referred to a primary physician for blood pressure management, diabetic screening, and for all other preventative health concerns.    DISPOSITION:  Patient will be discharged home in stable condition.    FOLLOW UP:  [facility]  [address] [place] [phone]    As needed, If symptoms worsen    Jostin Martin M.D.  [facility]  [place] [facility] [address]  [phone]    Schedule an appointment as soon as possible for a visit in 1 day        OUTPATIENT MEDICATIONS:  Discharge Medication List as of 2/7/2017  4:23 PM      START taking these medications    Details   ondansetron (ZOFRAN) 4 MG Tab tablet 4 mg, EVERY 4 HOURS PRN Starting 2/7/2017, Until Discontinued, Disp-20 Tab, R-0, Oral      bisacodyl (DULCOLAX) 5 MG EC tablet Take 2 Tabs by mouth as needed., Disp-30 Tab, R-0, Print Rx Paper

## 2017-02-07 NOTE — ED AVS SNAPSHOT
[employer] Access Code: [email or web address]  Expires: 3/2/2017  3:05 PM    [employer]  A secure, online tool to manage your health information     [employer]’s [employer]® is a secure, online tool that connects you to your personalized health information from the privacy of your home -- day or night - making it very easy for you to manage your healthcare. Once the activation process is completed, you can even access your medical information using the [employer] tejas, which is available for free in the [employer] store or Google Play store.     [employer] provides the following levels of access (as shown below):   My Chart Features   [facility] Primary Care Doctor [facility]  Specialists [facility]  Urgent  Care Non-[facility]  Primary Care  Doctor   Email your healthcare team securely and privately 24/7 X X X X   Manage appointments: schedule your next appointment; view details of past/upcoming appointments X      Request prescription refills. X      View recent personal medical records, including lab and immunizations X X X X   View health record, including health history, allergies, medications X X X X   Read reports about your outpatient visits, procedures, consult and ER notes X X X X   See your discharge summary, which is a recap of your hospital and/or ER visit that includes your diagnosis, lab results, and care plan. X X       How to register for [employer]:  1. Go to  [email or web address].  2. Click on the Sign Up Now box, which takes you to the New Member Sign Up page. You will need to provide the following information:  a. Enter your [employer] Access Code exactly as it appears at the top of this page. (You will not need to use this code after you’ve completed the sign-up process. If you do not sign up before the expiration date, you must request a new code.)   b. Enter your date of birth.   c. Enter your home email address.   d. Click Submit, and follow the next screen’s instructions.  3. Create a [employer] ID. This will be your [employer]  login ID and cannot be changed, so think of one that is secure and easy to remember.  4. Create a [employer] password. You can change your password at any time.  5. Enter your Password Reset Question and Answer. This can be used at a later time if you forget your password.   6. Enter your e-mail address. This allows you to receive e-mail notifications when new information is available in [employer].  7. Click Sign Up. You can now view your health information.    For assistance activating your [employer] account, call [phone]

## 2017-02-07 NOTE — ED NOTES
Pt bib [employer] from work; c/o anxiety and sob x1wk, pregnancy (EDVIN:17). . Pt has hx sciatica, taking methylprednisone, norco. Last dose of norco at 03am.

## 2017-02-07 NOTE — ED AVS SNAPSHOT
Home Care Instructions                                                                                                                Annabel Bethea   MRN: [id number]    Department:  [facility], Emergency Dept   Date of Visit:  2/7/2017            [facility], Emergency Dept    [address]    [place] [id number]    Phone:  [phone]      You were seen by     Leonides Webb D.O.      Your Diagnosis Was     Non-intractable vomiting with nausea, unspecified vomiting type     R11.2       These are the medications you received during your hospitalization from 02/07/2017 1248 to 02/07/2017 1623     Date/Time Order Dose Route Action    02/07/2017 1413 ondansetron (ZOFRAN ODT) dispertab 4 mg 4 mg Oral Given      Follow-up Information     1. Follow up with [facility].    Why:  As needed, If symptoms worsen    Contact information    [address] [facility] [phone]        2. Follow up with Jostin Martin M.D.. Schedule an appointment as soon as possible for a visit in 1 day.    Specialty:  OB/Gyn    Contact information    [address]  [place] [id number] [phone]        Medication Information     Review all of your home medications and newly ordered medications with your primary doctor and/or pharmacist as soon as possible. Follow medication instructions as directed by your doctor and/or pharmacist.     Please keep your complete medication list with you and share with your physician. Update the information when medications are discontinued, doses are changed, or new medications (including over-the-counter products) are added; and carry medication information at all times in the event of emergency situations.               Medication List      START taking these medications        Instructions    bisacodyl 5 MG EC tablet   Commonly known as:  DULCOLAX    Take 2 Tabs by mouth as needed.   Dose:  10 mg       ondansetron 4 MG Tabs tablet   Commonly known as:  ZOFRAN    Take 1 Tab by mouth every four hours as needed.   Dose:  4 mg         ASK your doctor about these medications        Instructions    Doxylamine-Pyridoxine 10-10 MG Tbec delayed-release tablet    Take 1 Tab by mouth 4 times a day as needed (nausea).   Dose:  1 Tab       oxycodone-acetaminophen 5-325 MG Tabs   Commonly known as:  PERCOCET    Take 1-2 Tabs by mouth every 6 hours as needed for Moderate Pain (pain).   Dose:  1-2 Tab       TYLENOL EXTRA STRENGTH PO    Take  by mouth.               Procedures and tests performed during your visit     US-OB LIMITED TRANSABDOMINAL        Discharge Instructions       ABCs of Pregnancy  A  Antepartum care is very important. Be sure you see your doctor and get prenatal care as soon as you think you are pregnant. At this time, you will be tested for infection, genetic abnormalities and potential problems with you and the pregnancy. This is the time to discuss diet, exercise, work, medications, labor, pain medication during labor and the possibility of a  delivery. Ask any questions that may concern you. It is important to see your doctor regularly throughout your pregnancy. Avoid exposure to toxic substances and chemicals - such as cleaning solvents, lead and mercury, some insecticides, and paint. Pregnant women should avoid exposure to paint fumes, and fumes that cause you to feel ill, dizzy or faint. When possible, it is a good idea to have a pre-pregnancy consultation with your caregiver to begin some important recommendations your caregiver suggests such as, taking folic acid, exercising, quitting smoking, avoiding alcoholic beverages, etc.  B  Breastfeeding is the healthiest choice for both you and your baby. It has many nutritional benefits for the baby and health benefits for the mother. It also creates a very tight and loving bond between the baby and mother. Talk to your doctor, your family and  friends, and your employer about how you choose to feed your baby and how they can support you in your decision. Not all birth defects can be prevented, but a woman can take actions that may increase her chance of having a healthy baby. Many birth defects happen very early in pregnancy, sometimes before a woman even knows she is pregnant. Birth defects or abnormalities of any child in your or the father's family should be discussed with your caregiver. Get a good support bra as your breast size changes. Wear it especially when you exercise and when nursing.   C  Celebrate the news of your pregnancy with the your spouse/father and family. Childbirth classes are helpful to take for you and the spouse/father because it helps to understand what happens during the pregnancy, labor and delivery.  delivery should be discussed with your doctor so you are prepared for that possibility. The pros and cons of circumcision if it is a boy, should be discussed with your pediatrician. Cigarette smoking during pregnancy can result in low birth weight babies. It has been associated with infertility, miscarriages, tubal pregnancies, infant death (mortality) and poor health (morbidity) in childhood. Additionally, cigarette smoking may cause long-term learning disabilities. If you smoke, you should try to quit before getting pregnant and not smoke during the pregnancy. Secondary smoke may also harm a mother and her developing baby. It is a good idea to ask people to stop smoking around you during your pregnancy and after the baby is born. Extra calcium is necessary when you are pregnant and is found in your prenatal vitamin, in dairy products, green leafy vegetables and in calcium supplements.  D  A healthy diet according to your current weight and height, along with vitamins and mineral supplements should be discussed with your caregiver. Domestic abuse or violence should be made known to your doctor right away to get the  situation corrected. Drink more water when you exercise to keep hydrated. Discomfort of your back and legs usually develops and progresses from the middle of the second trimester through to delivery of the baby. This is because of the enlarging baby and uterus, which may also affect your balance. Do not take illegal drugs. Illegal drugs can seriously harm the baby and you. Drink extra fluids (water is best) throughout pregnancy to help your body keep up with the increases in your blood volume. Drink at least 6 to 8 glasses of water, fruit juice, or milk each day. A good way to know you are drinking enough fluid is when your urine looks almost like clear water or is very light yellow.   E  Eat healthy to get the nutrients you and your unborn baby need. Your meals should include the five basic food groups. Exercise (30 minutes of light to moderate exercise a day) is important and encouraged during pregnancy, if there are no medical problems or problems with the pregnancy. Exercise that causes discomfort or dizziness should be stopped and reported to your caregiver. Emotions during pregnancy can change from being ecstatic to depression and should be understood by you, your partner and your family.  F  Fetal screening with ultrasound, amniocentesis and monitoring during pregnancy and labor is common and sometimes necessary. Take 400 micrograms of folic acid daily both before, when possible, and during the first few months of pregnancy to reduce the risk of birth defects of the brain and spine. All women who could possibly become pregnant should take a vitamin with folic acid, every day. It is also important to eat a healthy diet with fortified foods (enriched grain products, including cereals, rice, breads, and pastas) and foods with natural sources of folate (orange juice, green leafy vegetables, beans, peanuts, broccoli, asparagus, peas, and lentils). The father should be involved with all aspects of the pregnancy  including, the prenatal care, childbirth classes, labor, delivery, and postpartum time. Fathers may also have emotional concerns about being a father, financial needs, and raising a family.  G  Genetic testing should be done appropriately. It is important to know your family and the father's history. If there have been problems with pregnancies or birth defects in your family, report these to your doctor. Also, genetic counselors can talk with you about the information you might need in making decisions about having a family. You can call a major medical center in your area for help in finding a board-certified genetic counselor. Genetic testing and counseling should be done before pregnancy when possible, especially if there is a history of problems in the mother's or father's family. Certain ethnic backgrounds are more at risk for genetic defects.  H  Get familiar with the hospital where you will be having your baby. Get to know how long it takes to get there, the labor and delivery area, and the hospital procedures. Be sure your medical insurance is accepted there. Get your home ready for the baby including, clothes, the baby's room (when possible), furniture and car seat. Hand washing is important throughout the day, especially after handling raw meat and poultry, changing the baby's diaper or using the bathroom. This can help prevent the spread of many bacteria and viruses that cause infection. Your hair may become dry and thinner, but will return to normal a few weeks after the baby is born. Heartburn is a common problem that can be treated by taking antacids recommended by your caregiver, eating smaller meals 5 or 6 times a day, not drinking liquids when eating, drinking between meals and raising the head of your bed 2 to 3 inches.  I  Insurance to cover you, the baby, doctor and hospital should be reviewed so that you will be prepared to pay any costs not covered by your insurance plan. If you do not have  medical insurance, there are usually clinics and services available for you in your community. Take 30 milligrams of iron during your pregnancy as prescribed by your doctor to reduce the risk of low red blood cells (anemia) later in pregnancy. All women of childbearing age should eat a diet rich in iron.  J  There should be a joint effort for the mother, father and any other children to adapt to the pregnancy financially, emotionally, and psychologically during the pregnancy. Join a support group for moms-to-be. Or, join a class on parenting or childbirth. Have the family participate when possible.  K  Know your limits. Let your caregiver know if you experience any of the following:   · Pain of any kind.  · Strong cramps.  · You develop a lot of weight in a short period of time (5 pounds in 3 to 5 days).  · Vaginal bleeding, leaking of amniotic fluid.  · Headache, vision problems.  · Dizziness, fainting, shortness of breath.  · Chest pain.  · Fever of 102° F (38.9° C) or higher.  · Gush of clear fluid from your vagina.  · Painful urination.  · Domestic violence.  · Irregular heartbeat (palpitations).  · Rapid beating of the heart (tachycardia).  · Constant feeling sick to your stomach (nauseous) and vomiting.  · Trouble walking, fluid retention (edema).  · Muscle weakness.  · If your baby has decreased activity.  · Persistent diarrhea.  · Abnormal vaginal discharge.  · Uterine contractions at 20-minute intervals.  · Back pain that travels down your leg.  L  Learn and practice that what you eat and drink should be in moderation and healthy for you and your baby. Legal drugs such as alcohol and caffeine are important issues for pregnant women. There is no safe amount of alcohol a woman can drink while pregnant. Fetal alcohol syndrome, a disorder characterized by growth retardation, facial abnormalities, and central nervous system dysfunction, is caused by a woman's use of alcohol during pregnancy. Caffeine, found in  tea, coffee, soft drinks and chocolate, should also be limited. Be sure to read labels when trying to cut down on caffeine during pregnancy. More than 200 foods, beverages, and over-the-counter medications contain caffeine and have a high salt content! There are coffees and teas that do not contain caffeine.  M  Medical conditions such as diabetes, epilepsy, and high blood pressure should be treated and kept under control before pregnancy when possible, but especially during pregnancy. Ask your caregiver about any medications that may need to be changed or adjusted during pregnancy. If you are currently taking any medications, ask your caregiver if it is safe to take them while you are pregnant or before getting pregnant when possible. Also, be sure to discuss any herbs or vitamins you are taking. They are medicines, too! Discuss with your doctor all medications, prescribed and over-the-counter, that you are taking. During your prenatal visit, discuss the medications your doctor may give you during labor and delivery.  N  Never be afraid to ask your doctor or caregiver questions about your health, the progress of the pregnancy, family problems, stressful situations, and recommendation for a pediatrician, if you do not have one. It is better to take all precautions and discuss any questions or concerns you may have during your office visits. It is a good idea to write down your questions before you visit the doctor.  O  Over-the-counter cough and cold remedies may contain alcohol or other ingredients that should be avoided during pregnancy. Ask your caregiver about prescription, herbs or over-the-counter medications that you are taking or may consider taking while pregnant.   P  Physical activity during pregnancy can benefit both you and your baby by lessening discomfort and fatigue, providing a sense of well-being, and increasing the likelihood of early recovery after delivery. Light to moderate exercise during  pregnancy strengthens the belly (abdominal) and back muscles. This helps improve posture. Practicing yoga, walking, swimming, and cycling on a stationary bicycle are usually safe exercises for pregnant women. Avoid scuba diving, exercise at high altitudes (over 3000 feet), skiing, horseback riding, contact sports, etc. Always check with your doctor before beginning any kind of exercise, especially during pregnancy and especially if you did not exercise before getting pregnant.  Q  Queasiness, stomach upset and morning sickness are common during pregnancy. Eating a couple of crackers or dry toast before getting out of bed. Foods that you normally love may make you feel sick to your stomach. You may need to substitute other nutritious foods. Eating 5 or 6 small meals a day instead of 3 large ones may make you feel better. Do not drink with your meals, drink between meals. Questions that you have should be written down and asked during your prenatal visits.  R  Read about and make plans to baby-proof your home. There are important tips for making your home a safer environment for your baby. Review the tips and make your home safer for you and your baby. Read food labels regarding calories, salt and fat content in the food.  S  Saunas, hot tubs, and steam rooms should be avoided while you are pregnant. Excessive high heat may be harmful during your pregnancy. Your caregiver will screen and examine you for sexually transmitted diseases and genetic disorders during your prenatal visits. Learn the signs of labor. Sexual relations while pregnant is safe unless there is a medical or pregnancy problem and your caregiver advises against it.  T  Traveling long distances should be avoided especially in the third trimester of your pregnancy. If you do have to travel out of state, be sure to take a copy of your medical records and medical insurance plan with you. You should not travel long distances without seeing your doctor  first. Most airlines will not allow you to travel after 36 weeks of pregnancy. Toxoplasmosis is an infection caused by a parasite that can seriously harm an unborn baby. Avoid eating undercooked meat and handling cat litter. Be sure to wear gloves when gardening. Tingling of the hands and fingers is not unusual and is due to fluid retention. This will go away after the baby is born.  U  Womb (uterus) size increases during the first trimester. Your kidneys will begin to function more efficiently. This may cause you to feel the need to urinate more often. You may also leak urine when sneezing, coughing or laughing. This is due to the growing uterus pressing against your bladder, which lies directly in front of and slightly under the uterus during the first few months of pregnancy. If you experience burning along with frequency of urination or bloody urine, be sure to tell your doctor. The size of your uterus in the third trimester may cause a problem with your balance. It is advisable to maintain good posture and avoid wearing high heels during this time. An ultrasound of your baby may be necessary during your pregnancy and is safe for you and your baby.  V  Vaccinations are an important concern for pregnant women. Get needed vaccines before pregnancy. Center for Disease Control (www.cdc.gov) has clear guidelines for the use of vaccines during pregnancy. Review the list, be sure to discuss it with your doctor. Prenatal vitamins are helpful and healthy for you and the baby. Do not take extra vitamins except what is recommended. Taking too much of certain vitamins can cause overdose problems. Continuous vomiting should be reported to your caregiver. Varicose veins may appear especially if there is a family history of varicose veins. They should subside after the delivery of the baby. Support hose helps if there is leg discomfort.  W  Being overweight or underweight during pregnancy may cause problems. Try to get within  15 pounds of your ideal weight before pregnancy. Remember, pregnancy is not a time to be dieting! Do not stop eating or start skipping meals as your weight increases. Both you and your baby need the calories and nutrition you receive from a healthy diet. Be sure to consult with your doctor about your diet. There is a formula and diet plan available depending on whether you are overweight or underweight. Your caregiver or nutritionist can help and advise you if necessary.  X  Avoid X-rays. If you must have dental work or diagnostic tests, tell your dentist or physician that you are pregnant so that extra care can be taken. X-rays should only be taken when the risks of not taking them outweigh the risk of taking them. If needed, only the minimum amount of radiation should be used. When X-rays are necessary, protective lead shields should be used to cover areas of the body that are not being X-rayed.  Y  Your baby loves you. Breastfeeding your baby creates a loving and very close bond between the two of you. Give your baby a healthy environment to live in while you are pregnant. Infants and children require constant care and guidance. Their health and safety should be carefully watched at all times. After the baby is born, rest or take a nap when the baby is sleeping.  Z  Get your ZZZs. Be sure to get plenty of rest. Resting on your side as often as possible, especially on your left side is advised. It provides the best circulation to your baby and helps reduce swelling. Try taking a nap for 30 to 45 minutes in the afternoon when possible. After the baby is born rest or take a nap when the baby is sleeping. Try elevating your feet for that amount of time when possible. It helps the circulation in your legs and helps reduce swelling.   Most information courtesy of the CDC.  Document Released: 12/18/2006 Document Revised: 03/11/2013 Document Reviewed: 09/01/2010  [employer]® Patient Information ©2014 [employer],  "LL[employer].    Eating Plan for Pregnant Women  While you are pregnant, your body will require additional nutrition to help support your growing baby. It is recommended that you consume:  · 150 additional calories each day during your first trimester.  · 300 additional calories each day during your second trimester.  · 300 additional calories each day during your third trimester.  Eating a healthy, well-balanced diet is very important for your health and for your baby's health. You also have a higher need for some vitamins and minerals, such as folic acid, calcium, iron, and vitamin D.  WHAT DO I NEED TO KNOW ABOUT EATING DURING PREGNANCY?  · Do not try to lose weight or go on a diet during pregnancy.  · Choose healthy, nutritious foods. Choose ½ of a sandwich with a glass of milk instead of a candy bar or a high-calorie sugar-sweetened beverage.  · Limit your overall intake of foods that have \"empty calories.\" These are foods that have little nutritional value, such as sweets, desserts, candies, sugar-sweetened beverages, and fried foods.  · Eat a variety of foods, especially fruits and vegetables.  · Take a prenatal vitamin to help meet the additional needs during pregnancy, specifically for folic acid, iron, calcium, and vitamin D.  · Remember to stay active. Ask your health care provider for exercise recommendations that are specific to you.  · Practice good food safety and cleanliness, such as washing your hands before you eat and after you prepare raw meat. This helps to prevent foodborne illnesses, such as listeriosis, that can be very dangerous for your baby. Ask your health care provider for more information about listeriosis.  WHAT DOES 150 EXTRA CALORIES LOOK LIKE?  Healthy options for an additional 150 calories each day could be any of the following:  · Plain low-fat yogurt (6-8 oz) with ½ cup of berries.  · 1 apple with 2 teaspoons of peanut butter.  · Cut-up vegetables with ¼ cup of hummus.  · Low-fat " "chocolate milk (8 oz or 1 cup).  · 1 string cheese with 1 medium orange.  · ½ of a peanut butter and jelly sandwich on whole-wheat bread (1 tsp of peanut butter).  For 300 calories, you could eat two of those healthy options each day.   WHAT IS A HEALTHY AMOUNT OF WEIGHT TO GAIN?  The recommended amount of weight for you to gain is based on your pre-pregnancy BMI. If your pre-pregnancy BMI was:  · Less than 18 (underweight), you should gain 28-40 lb.  · 18-24.9 (normal), you should gain 25-35 lb.  · 25-29.9 (overweight), you should gain 15-25 lb.  · Greater than 30 (obese), you should gain 11-20 lb.  WHAT IF I AM HAVING TWINS OR MULTIPLES?  Generally, pregnant women who will be having twins or multiples may need to increase their daily calories by 300-600 calories each day. The recommended range for total weight gain is 25-54 lb, depending on your pre-pregnancy BMI. Talk with your health care provider for specific guidance about additional nutritional needs, weight gain, and exercise during your pregnancy.  WHAT FOODS CAN I EAT?  Grains  Any grains. Try to choose whole grains, such as whole-wheat bread, oatmeal, or brown rice.  Vegetables  Any vegetables. Try to eat a variety of colors and types of vegetables to get a full range of vitamins and minerals. Remember to wash your vegetables well before eating.  Fruits  Any fruits. Try to eat a variety of colors and types of fruit to get a full range of vitamins and minerals. Remember to wash your fruits well before eating.  Meats and Other Protein Sources  Lean meats, including chicken, turkey, fish, and lean cuts of beef, veal, or pork. Make sure that all meats are cooked to \"well done.\" Tofu. Tempeh. Beans. Eggs. Peanut butter and other nut butters. Seafood, such as shrimp, crab, and lobster. If you choose fish, select types that are higher in omega-3 fatty acids, including salmon, herring, mussels, trout, sardines, and pollock. Make sure that all meats are cooked to " food-safe temperatures.  Dairy  Pasteurized milk and milk alternatives. Pasteurized yogurt and pasteurized cheese. Cottage cheese. Sour cream.  Beverages  Water. Juices that contain 100% fruit juice or vegetable juice. Caffeine-free teas and decaffeinated coffee. Drinks that contain caffeine are okay to drink, but it is better to avoid caffeine. Keep your total caffeine intake to less than 200 mg each day (12 oz of coffee, tea, or soda) or as directed by your health care provider.  Condiments  Any pasteurized condiments.  Sweets and Desserts  Any sweets and desserts.  Fats and Oils  Any fats and oils.  The items listed above may not be a complete list of recommended foods or beverages. Contact your dietitian for more options.  WHAT FOODS ARE NOT RECOMMENDED?  Vegetables  Unpasteurized (raw) vegetable juices.  Fruits  Unpasteurized (raw) fruit juices.  Meats and Other Protein Sources  Cured meats that have nitrates, such as hernandez, salami, and hotdogs. Luncheon meats, bologna, or other deli meats (unless they are reheated until they are steaming hot). Refrigerated austin, meat spreads from a meat counter, smoked seafood that is found in the refrigerated section of a store. Raw fish, such as sushi or sashimi. High mercury content fish, such as tilefish, shark, swordfish, and jose mackerel. Raw meats, such as tuna or beef tartare. Undercooked meats and poultry. Make sure that all meats are cooked to food-safe temperatures.  Dairy  Unpasteurized (raw) milk and any foods that have raw milk in them. Soft cheeses, such as feta, queso guadalupe, queso fresco, Brie, Camembert cheeses, blue-veined cheeses, and Panela cheese (unless it is made with pasteurized milk, which must be stated on the label).  Beverages  Alcohol. Sugar-sweetened beverages, such as sodas, teas, or energy drinks.  Condiments  Homemade fermented foods and drinks, such as pickles, sauerkraut, or kombucha drinks. (Store-bought pasteurized versions of these are  okay.)  Other  Salads that are made in the store, such as ham salad, chicken salad, egg salad, tuna salad, and seafood salad.  The items listed above may not be a complete list of foods and beverages to avoid. Contact your dietitian for more information.     This information is not intended to replace advice given to you by your health care provider. Make sure you discuss any questions you have with your health care provider.     Document Released: 10/02/2015 Document Reviewed: 10/02/2015  EXFO Interactive Patient Education ©2016 Elsevier Inc.  Ansiedad y crisis de angustia  (Anxiety and Panic Attacks)  El profesional que lo asiste le umana informado que usted padece ansiedad o crisis de angustia. Roya trastorno puede presentarse de varias formas. La mayor parte de las veces las crisis aparecen de modo repentino y sin aviso. Se producen en cualquier momento del día, incluso pilar el sueño, y en cualquier etapa de la dalila. Pueden ser muy intensas e inexplicadas. Aunque un ataque de pánico puede ser muy atemorizante, no produce daños físicos. Algunas veces se desconoce la causa de la ansiedad. La ansiedad es un mecanismo protector del organismo en somers respuesta de romain o escape. Muchas de estas situaciones de percepción de peligro son en realidad situaciones no físicas (ben la ansiedad de perder el trabajo).  CAUSAS  Las causas de la ansiedad o de un ataque de pánico pueden ser muchas. Los ataques de pánico pueden ocurrir en personas sanas en patricia serie de circunstancias. Es posible que haya patricia causa genética de los ataques de pánico. Algunos medicamentos pueden provocar ansiedad ben efecto secundario.  SÍNTOMAS  Algunas de las sensaciones más comunes son:  · Terror intenso.  · Vahídos, desfallecimiento.  · Golpes de frío y calor.  · Temor a enloquecer.  · Sentimiento de irrealidad.  · Sudoración.  · Temblores.  · Dolor en el pecho y latidos irregulares (palpitaciones).  · Sensaciones de ahogo o  sofocos.  · Sentimiento de peligro inminente y de que la muerte está próxima.  · Hormigueo en las extremidades que puede venir de la respiración agitada.  · Alteración de la realidad (desrealización).  · Sentirse separado de taylor mismo (despersonalización).  Estos son los síntomas (problemas) más comunes y pueden combinarse para presentar la crisis de angustia.   DIAGNÓSTICO  La evaluación que realice el profesional dependerá del tipo de síntomas que esté experimentando. El diagnóstico de la ansiedad o de ataque de pánico se realiza cuando no se encuentra ninguna enfermedad física que pueda determinarse ben la causa de los síntomas.  TRATAMIENTO  El tratamiento para prevenir la ansiedad y los ataques de pánico incluye:  · Evitar las circunstancias que causen ansiedad.  · Reaseguro y relajación.  · Ejercicio regular.  · Terapias de relajación, ben yoga.  · Psicoterapia con un psiquiatra o terapeuta.  · Evitar la cafeína, el alcohol y las drogas ilegales.  · Medicamentos de prescripción.  SOLICITE ATENCIÓN MÉDICA DE INMEDIATO SI:  · Usted experimenta síntomas de ataque de pánico que son distintos a bianca síntomas usuales.  · Tiene cualquier síntoma que empeora o lo preocupa.  Document Released: 12/18/2006 Document Revised: 03/11/2013  ExitCare® Patient Information ©2014 [employer].            Patient Information     Patient Information    Following emergency treatment: all patient requiring follow-up care must return either to a private physician or a clinic if your condition worsens before you are able to obtain further medical attention, please return to the emergency room.     Billing Information    At [facility], we work to make the billing process streamlined for our patients.  Our Representatives are here to answer any questions you may have regarding your hospital bill.  If you have insurance coverage and have supplied your insurance information to us, we will submit a claim to your insurer on your behalf.   Should you have any questions regarding your bill, we can be reached online or by phone as follows:  Online: You are able pay your bills online or live chat with our representatives about any billing questions you may have. We are here to help Monday - Friday from 8:00am to 7:30pm and 9:00am - 12:00pm on Saturdays.  Please visit [email or web address]  for more information.   Phone:  [phone] or [phone]    Please note that your emergency physician, surgeon, pathologist, radiologist, anesthesiologist, and other specialists are not employed by [facility] and will therefore bill separately for their services.  Please contact them directly for any questions concerning their bills at the numbers below:     Emergency Physician Services:  [phone]  [facility]:  [phone]  [facility]:  [phone]  [facility]:  [phone]    1. Your final bill may vary from the amount quoted upon discharge if all procedures are not complete at that time, or if your doctor has additional procedures of which we are not aware. You will receive an additional bill if you return to the Emergency Department at [facility] for suture removal regardless of the facility of which the sutures were placed.     2. Please arrange for settlement of this account at the emergency registration.    3. All self-pay accounts are due in full at the time of treatment.  If you are unable to meet this obligation then payment is expected within 4-5 days.     4. If you have had radiology studies (CT, X-ray, Ultrasound, MRI), you have received a preliminary result during your emergency department visit. Please contact the radiology department [phone] to receive a copy of your final result. Please discuss the Final result with your primary physician or with the follow up physician provided.     Crisis Hotline:  National Crisis Hotline:  7-445-QFKVBDC or  1-994.466.9385  [place] Crisis Hotline:    1-833.508.3771 or 612-974-6098         ED Discharge Follow Up Questions    1. In order to provide you with very good care, we would like to follow up with a phone call in the next few days.  May we have your permission to contact you?     YES /  NO    2. What is the best phone number to call you? (       )_____-__________    3. What is the best time to call you?      Morning  /  Afternoon  /  Evening                   Patient Signature:  ____________________________________________________________    Date:  ____________________________________________________________      Your appointments     Feb 08, 2017  3:00 PM   OB Follow Up with VENESSA GORDON   [facility] ([facility])    [address] [place] [address] [address]  Chris KENNED[place] [id number] [phone]

## 2017-02-08 ENCOUNTER — ROUTINE PRENATAL (OUTPATIENT)
Dept: OBGYN | Facility: CLINIC | Age: 37
End: 2017-02-08
Payer: MEDICAID

## 2017-02-08 VITALS — DIASTOLIC BLOOD PRESSURE: 82 MMHG | BODY MASS INDEX: 37.89 KG/M2 | WEIGHT: 242 LBS | SYSTOLIC BLOOD PRESSURE: 128 MMHG

## 2017-02-08 DIAGNOSIS — O09.92 SUPERVISION OF HIGH RISK PREGNANCY, ANTEPARTUM, SECOND TRIMESTER: ICD-10-CM

## 2017-02-08 PROCEDURE — 90040 PR PRENATAL FOLLOW UP: CPT | Performed by: OBSTETRICS & GYNECOLOGY

## 2017-02-08 NOTE — MR AVS SNAPSHOT
Annabel Bethea   2017 3:00 PM   Routine Prenatal   MRN: [id number]    Department[facility]   Dept Phone:  [phone]    Description:  Female : 1980   Provider:  Jacey Monet M.D.           Allergies as of 2017     Allergen Noted Reactions    Peanut-Derived 2016         You were diagnosed with     Supervision of high risk pregnancy, antepartum, second trimester   [[id number]]         Vital Signs     Blood Pressure Weight Last Menstrual Period Smoking Status          128/82 mmHg 109.77 kg (242 lb) 08/15/2016 Former Smoker        Basic Information     Date Of Birth Sex Race Ethnicity Preferred Language    1980 Female Black or  Non- English      Problem List              ICD-10-CM Priority Class Noted - Resolved    Supervision of high risk pregnancy, antepartum O09.90   2016 - Present    History of gestational diabetes in prior pregnancy, currently pregnant O09.299, Z86.32   2016 - Present      Health Maintenance        Date Due Completion Dates    IMM DTaP/Tdap/Td Vaccine (1 - Tdap) 1999 ---    IMM INFLUENZA (1) 2016 ---    PAP SMEAR 2019            Current Immunizations     No immunizations on file.      Below and/or attached are the medications your provider expects you to take. Review all of your home medications and newly ordered medications with your provider and/or pharmacist. Follow medication instructions as directed by your provider and/or pharmacist. Please keep your medication list with you and share with your provider. Update the information when medications are discontinued, doses are changed, or new medications (including over-the-counter products) are added; and carry medication information at all times in the event of emergency situations     Allergies:  PEANUT-DERIVED - (reactions not documented)               Medications  Valid as of: 2017 -  3:44 PM    Generic Name Brand Name  Tablet Size Instructions for use    Acetaminophen   Take  by mouth.        Bisacodyl (Tablet Delayed Response) DULCOLAX 5 MG Take 2 Tabs by mouth as needed.        Doxylamine-Pyridoxine (Tablet Delayed Response) Doxylamine-Pyridoxine 10-10 MG Take 1 Tab by mouth 4 times a day as needed (nausea).        Ondansetron HCl (Tab) ZOFRAN 4 MG Take 1 Tab by mouth every four hours as needed.        Oxycodone-Acetaminophen (Tab) PERCOCET 5-325 MG Take 1-2 Tabs by mouth every 6 hours as needed for Moderate Pain (pain).        .                 Medicines prescribed today were sent to:     [email or web address] [address] [place] [address], [place] - [address] [facility] [address] [address]    Phone: [phone] Fax: [phone]    Open 24 Hours?: No      Medication refill instructions:       If your prescription bottle indicates you have medication refills left, it is not necessary to call your provider’s office. Please contact your pharmacy and they will refill your medication.    If your prescription bottle indicates you do not have any refills left, you may request refills at any time through one of the following ways: The online [employer] system (except Urgent Care), by calling your provider’s office, or by asking your pharmacy to contact your provider’s office with a refill request. Medication refills are processed only during regular business hours and may not be available until the next business day. Your provider may request additional information or to have a follow-up visit with you prior to refilling your medication.   *Please Note: Medication refills are assigned a new Rx number when refilled electronically. Your pharmacy may indicate that no refills were authorized even though a new prescription for the same medication is available at the pharmacy. Please request the medicine by name with the pharmacy before contacting your provider for a refill.        Referral     A referral request has  been sent to our patient care coordination department. Please allow 3-5 business days for us to process this request and contact you either by phone or mail. If you do not hear from us by the 5th business day, please call us at [phone].           FeedBurner Access Code: [email or web address]  Expires: 3/2/2017  3:05 PM    FeedBurner  A secure, online tool to manage your health information     [employer]’s FeedBurner® is a secure, online tool that connects you to your personalized health information from the privacy of your home -- day or night - making it very easy for you to manage your healthcare. Once the activation process is completed, you can even access your medical information using the FeedBurner tejas, which is available for free in the Apple Tejas store or Google Play store.     FeedBurner provides the following levels of access (as shown below):   My Chart Features   [facility] Primary Care Doctor [facility]  Specialists [facility]  Urgent  Care Non-[facility]  Primary Care  Doctor   Email your healthcare team securely and privately 24/7 X X X    Manage appointments: schedule your next appointment; view details of past/upcoming appointments X      Request prescription refills. X      View recent personal medical records, including lab and immunizations X X X X   View health record, including health history, allergies, medications X X X X   Read reports about your outpatient visits, procedures, consult and ER notes X X X X   See your discharge summary, which is a recap of your hospital and/or ER visit that includes your diagnosis, lab results, and care plan. X X       How to register for FeedBurner:  1. Go to  [email or web address].  2. Click on the Sign Up Now box, which takes you to the New Member Sign Up page. You will need to provide the following information:  a. Enter your FeedBurner Access Code exactly as it appears at the top of this page. (You will not need to use this code after you’ve completed the sign-up process. If you  do not sign up before the expiration date, you must request a new code.)   b. Enter your date of birth.   c. Enter your home email address.   d. Click Submit, and follow the next screen’s instructions.  3. Create a Wimba ID. This will be your Wimba login ID and cannot be changed, so think of one that is secure and easy to remember.  4. Create a Wimba password. You can change your password at any time.  5. Enter your Password Reset Question and Answer. This can be used at a later time if you forget your password.   6. Enter your e-mail address. This allows you to receive e-mail notifications when new information is available in Wimba.  7. Click Sign Up. You can now view your health information.    For assistance activating your Wimba account, call [phone]

## 2017-02-08 NOTE — Clinical Note
February 8, 2017       Patient: Annabel Bethea   YOB: 1980   Date of Visit: 2/8/2017         To Whom It May Concern:    It is my medical opinion that Annabel Bethea remain out of work until delivery.    If you have any questions or concerns, please don't hesitate to call [phone]          Sincerely,          Jacey Monet M.D.  Electronically Signed

## 2017-02-08 NOTE — PROGRESS NOTES
Patient reports no bleeding, no cramping, no leaking     Fetal Movement:  normal     Uterine Size: S=D    Assessment:     Pregnancy at 25 weeks.      With herniated disc has difficult  Working.   was on steriods and pain pills.   neurologist stopped steriods recently.   as per patient planning on surgery after birth of the baby.      Counseling and Plan:        Signs and symptoms of  labor: discussed  Work restrictions letter provided.     Follow up: 4 weeks

## 2017-02-08 NOTE — PROGRESS NOTES
Pt. Here for OB/FU to discuss back pain today. Reports [facility].   Good # [phone]  Pt states has 2 herniated disc on her back, states was taken off of steroids 2 weeks ago by her Neurologist, but since then she has fallen x 2 times.   Pharmacy verified.   Pt was taken to [facility] L&D on 2/7/17 by Adele from work due to anxiety, shortness of breath and palpitations had U/S done.

## 2017-02-09 NOTE — PROGRESS NOTES
Referral faxed to [facility] on 2/9/17  They will contact patient to schedule appt.  Please check with patient if appt was made/ document. Thank you

## 2017-03-02 ENCOUNTER — HOSPITAL ENCOUNTER (OUTPATIENT)
Facility: MEDICAL CENTER | Age: 37
End: 2017-03-02
Attending: OBSTETRICS & GYNECOLOGY | Admitting: OBSTETRICS & GYNECOLOGY
Payer: MEDICAID

## 2017-03-02 VITALS
TEMPERATURE: 98.5 F | HEART RATE: 133 BPM | HEIGHT: 67 IN | DIASTOLIC BLOOD PRESSURE: 57 MMHG | WEIGHT: 240 LBS | SYSTOLIC BLOOD PRESSURE: 113 MMHG | BODY MASS INDEX: 37.67 KG/M2

## 2017-03-02 LAB
ALBUMIN SERPL BCP-MCNC: 3.6 G/DL (ref 3.2–4.9)
ALBUMIN/GLOB SERPL: 1.2 G/DL
ALP SERPL-CCNC: 50 U/L (ref 30–99)
ALT SERPL-CCNC: 18 U/L (ref 2–50)
ANION GAP SERPL CALC-SCNC: 8 MMOL/L (ref 0–11.9)
APPEARANCE UR: ABNORMAL
AST SERPL-CCNC: 26 U/L (ref 12–45)
BASOPHILS # BLD AUTO: 0.5 % (ref 0–1.8)
BASOPHILS # BLD: 0.04 K/UL (ref 0–0.12)
BILIRUB SERPL-MCNC: 0.5 MG/DL (ref 0.1–1.5)
BUN SERPL-MCNC: 4 MG/DL (ref 8–22)
CALCIUM SERPL-MCNC: 8.9 MG/DL (ref 8.5–10.5)
CHLORIDE SERPL-SCNC: 103 MMOL/L (ref 96–112)
CO2 SERPL-SCNC: 19 MMOL/L (ref 20–33)
COLOR UR AUTO: ABNORMAL
CREAT SERPL-MCNC: 0.47 MG/DL (ref 0.5–1.4)
EOSINOPHIL # BLD AUTO: 0.11 K/UL (ref 0–0.51)
EOSINOPHIL NFR BLD: 1.3 % (ref 0–6.9)
ERYTHROCYTE [DISTWIDTH] IN BLOOD BY AUTOMATED COUNT: 43.8 FL (ref 35.9–50)
GFR SERPL CREATININE-BSD FRML MDRD: >60 ML/MIN/1.73 M 2
GLOBULIN SER CALC-MCNC: 3 G/DL (ref 1.9–3.5)
GLUCOSE SERPL-MCNC: 123 MG/DL (ref 65–99)
GLUCOSE UR QL STRIP.AUTO: NEGATIVE MG/DL
HCT VFR BLD AUTO: 36 % (ref 37–47)
HGB BLD-MCNC: 12.4 G/DL (ref 12–16)
IMM GRANULOCYTES # BLD AUTO: 0.05 K/UL (ref 0–0.11)
IMM GRANULOCYTES NFR BLD AUTO: 0.6 % (ref 0–0.9)
KETONES UR QL STRIP.AUTO: >=160 MG/DL
LEUKOCYTE ESTERASE UR QL STRIP.AUTO: ABNORMAL
LYMPHOCYTES # BLD AUTO: 0.47 K/UL (ref 1–4.8)
LYMPHOCYTES NFR BLD: 5.4 % (ref 22–41)
MCH RBC QN AUTO: 31.6 PG (ref 27–33)
MCHC RBC AUTO-ENTMCNC: 34.4 G/DL (ref 33.6–35)
MCV RBC AUTO: 91.8 FL (ref 81.4–97.8)
MONOCYTES # BLD AUTO: 0.55 K/UL (ref 0–0.85)
MONOCYTES NFR BLD AUTO: 6.3 % (ref 0–13.4)
NEUTROPHILS # BLD AUTO: 7.53 K/UL (ref 2–7.15)
NEUTROPHILS NFR BLD: 85.9 % (ref 44–72)
NITRITE UR QL STRIP.AUTO: NEGATIVE
NRBC # BLD AUTO: 0 K/UL
NRBC BLD AUTO-RTO: 0 /100 WBC
PH UR STRIP.AUTO: 6.5 [PH]
PLATELET # BLD AUTO: 198 K/UL (ref 164–446)
PMV BLD AUTO: 11.8 FL (ref 9–12.9)
POTASSIUM SERPL-SCNC: 3.6 MMOL/L (ref 3.6–5.5)
PROT SERPL-MCNC: 6.6 G/DL (ref 6–8.2)
PROT UR QL STRIP: ABNORMAL MG/DL
RBC # BLD AUTO: 3.92 M/UL (ref 4.2–5.4)
RBC UR QL AUTO: ABNORMAL
SODIUM SERPL-SCNC: 130 MMOL/L (ref 135–145)
SP GR UR: 1.02
WBC # BLD AUTO: 8.8 K/UL (ref 4.8–10.8)

## 2017-03-02 PROCEDURE — 80053 COMPREHEN METABOLIC PANEL: CPT

## 2017-03-02 PROCEDURE — 700111 HCHG RX REV CODE 636 W/ 250 OVERRIDE (IP): Performed by: FAMILY MEDICINE

## 2017-03-02 PROCEDURE — 96374 THER/PROPH/DIAG INJ IV PUSH: CPT

## 2017-03-02 PROCEDURE — 96375 TX/PRO/DX INJ NEW DRUG ADDON: CPT

## 2017-03-02 PROCEDURE — 700102 HCHG RX REV CODE 250 W/ 637 OVERRIDE(OP): Performed by: FAMILY MEDICINE

## 2017-03-02 PROCEDURE — 85025 COMPLETE CBC W/AUTO DIFF WBC: CPT

## 2017-03-02 PROCEDURE — 700101 HCHG RX REV CODE 250: Performed by: OBSTETRICS & GYNECOLOGY

## 2017-03-02 PROCEDURE — 81002 URINALYSIS NONAUTO W/O SCOPE: CPT

## 2017-03-02 PROCEDURE — A9270 NON-COVERED ITEM OR SERVICE: HCPCS | Performed by: FAMILY MEDICINE

## 2017-03-02 PROCEDURE — 59025 FETAL NON-STRESS TEST: CPT | Performed by: OBSTETRICS & GYNECOLOGY

## 2017-03-02 PROCEDURE — 700111 HCHG RX REV CODE 636 W/ 250 OVERRIDE (IP): Performed by: OBSTETRICS & GYNECOLOGY

## 2017-03-02 RX ORDER — BISACODYL 10 MG
10 SUPPOSITORY, RECTAL RECTAL ONCE
Status: COMPLETED | OUTPATIENT
Start: 2017-03-02 | End: 2017-03-02

## 2017-03-02 RX ORDER — MORPHINE SULFATE 10 MG/ML
5 INJECTION, SOLUTION INTRAMUSCULAR; INTRAVENOUS ONCE
Status: COMPLETED | OUTPATIENT
Start: 2017-03-02 | End: 2017-03-02

## 2017-03-02 RX ORDER — SODIUM CHLORIDE, SODIUM LACTATE, POTASSIUM CHLORIDE, CALCIUM CHLORIDE 600; 310; 30; 20 MG/100ML; MG/100ML; MG/100ML; MG/100ML
1000 INJECTION, SOLUTION INTRAVENOUS CONTINUOUS
Status: DISCONTINUED | OUTPATIENT
Start: 2017-03-02 | End: 2017-03-02 | Stop reason: HOSPADM

## 2017-03-02 RX ORDER — ENEMA 19; 7 G/133ML; G/133ML
1 ENEMA RECTAL ONCE
Status: COMPLETED | OUTPATIENT
Start: 2017-03-02 | End: 2017-03-02

## 2017-03-02 RX ORDER — SODIUM CHLORIDE, SODIUM LACTATE, POTASSIUM CHLORIDE, CALCIUM CHLORIDE 600; 310; 30; 20 MG/100ML; MG/100ML; MG/100ML; MG/100ML
INJECTION, SOLUTION INTRAVENOUS ONCE
Status: COMPLETED | OUTPATIENT
Start: 2017-03-02 | End: 2017-03-02

## 2017-03-02 RX ORDER — ACETAMINOPHEN 325 MG/1
650 TABLET ORAL EVERY 4 HOURS PRN
Status: DISCONTINUED | OUTPATIENT
Start: 2017-03-02 | End: 2017-03-02 | Stop reason: HOSPADM

## 2017-03-02 RX ORDER — ONDANSETRON 2 MG/ML
4 INJECTION INTRAMUSCULAR; INTRAVENOUS ONCE
Status: COMPLETED | OUTPATIENT
Start: 2017-03-02 | End: 2017-03-02

## 2017-03-02 RX ADMIN — BISACODYL 10 MG: 10 SUPPOSITORY RECTAL at 09:13

## 2017-03-02 RX ADMIN — MORPHINE SULFATE 5 MG: 10 INJECTION INTRAVENOUS at 07:03

## 2017-03-02 RX ADMIN — SODIUM CHLORIDE, POTASSIUM CHLORIDE, SODIUM LACTATE AND CALCIUM CHLORIDE 1000 ML: 600; 310; 30; 20 INJECTION, SOLUTION INTRAVENOUS at 09:11

## 2017-03-02 RX ADMIN — SODIUM CHLORIDE, POTASSIUM CHLORIDE, SODIUM LACTATE AND CALCIUM CHLORIDE: 600; 310; 30; 20 INJECTION, SOLUTION INTRAVENOUS at 07:00

## 2017-03-02 RX ADMIN — ONDANSETRON 4 MG: 2 INJECTION, SOLUTION INTRAMUSCULAR; INTRAVENOUS at 07:03

## 2017-03-02 RX ADMIN — ACETAMINOPHEN 650 MG: 325 TABLET, FILM COATED ORAL at 09:11

## 2017-03-02 RX ADMIN — SODIUM PHOSPHATE, DIBASIC AND SODIUM PHOSPHATE, MONOBASIC 133 ML: 7; 19 ENEMA RECTAL at 13:00

## 2017-03-02 NOTE — PROGRESS NOTES
"[facility] LABOR AND DELIVERY TRIAGE PROGRESS NOTE    PATIENT ID:  NAME:  Annabel Bethea  MRN:               [id number]  YOB: 1980     36 y.o. female  at 28w3d.    Subjective: Pt presents with nausea and vomiting X 1 day with associated dizziness. Patient states she has been unable to keep any PO intake including fluids down. She denies hematemesis, diarrhea, melena, or hematochezia. Patient reports she has not had a bowel movement for 8 days. She denies abdominal pain, contractions, LOF, vaginal bleeding. She reports decreased fetal movement prior to presentation however is now feeling baby move.     Pregnancy complicated by severe back pain, herniated L4 and L5, plan for surgery s/p delivery.     ROS: Patient denies any fever chills, headache, chest pain, shortness of breath, or dysuria or unusual swelling of hands or feet.     Objective:    Filed Vitals:    17 0551 17 0607 17 0716   BP: 129/66 102/57 113/57   Pulse: 148 137 133   Temp: 36.5 °C (97.7 °F)  36.9 °C (98.5 °F)   Height: 1.702 m (5' 7\")     Weight: 108.863 kg (240 lb)       Temp (24hrs), Av.7 °C (98.1 °F), Min:36.5 °C (97.7 °F), Max:36.9 °C (98.5 °F)    General: No acute distress, resting comfortably in bed.  HEENT: normocephalic, nontraumatic, PERRLA, EOMI  Cardiovascular: Heart RRR with no murmurs, rubs or gallops. Distal Pulses 2+  Respiratory: symmetric chest expansion, lungs CTAB, with no wheezes, rales, rhonci  Abdomen: gravid, nontender  Musculoskeletal: strength 5/5 in four extremities  Neuro: non focal with no numbness, tingling or changes in sensation    Cervix:  Deferred  Kiana: Irritability   FHRM: Baseline 150, Accels to 175, no decels, mod variability    Assessment: 36 y.o. female  at 28w3d with nausea/vomiting, associated dehydration, constipation, and chronic back pain.    Plan:     Nausea and vomiting  - Acute gastroenteritis vs constipation   - zofran   - 2L LR bolus   - tx constipation per " below   - N/V resolved prior to discharge  - Constipation   - BM s/p dulcolax suppository and enema   - discharged with bowel regimen: colace 100mg BID   - encouraged adequate PO hydration   - Anxiety   - discussed risks and benefits of Zoloft, patient declines rx   - anticipate anxiety will improve s/p cessation of steroids   - CTM, follow up with [facility] in 1-2 weeks to reassess need for zoloft  - Back Pain: herniated L4 and L5   - s/p 7 months of intermittent steroids   - currently taking Norco occasionally    - pain tx effectively with morphine 5mg IM in triage   - discussed risks of daily narcotic use   - tylenol and maternity support belt for pain   - plan for surgery s/p delivery  - Fetal status   - reactive NST obtained      Discussed case with Dr. Hahn, [facility] Attending. Case was discussed and attending agreed with plan prior to discharge of patient.

## 2017-03-02 NOTE — PROGRESS NOTES
"0546  Pt admitted to L&D for OB check.  Pt of [facility], , EDVIN 17, 28 3/7 today.  Pt appears to be in significant amounts of pain, unable to sit still in wheelchair, writhing.  Pt states n/v since yesterday morning, states \"every time I eat I throw up\", states she is very worried because she has not felt her baby move since midnight.  Pt states negative UC, negative LOF, negative bleeding.  Pt placed on EFM, Pt reassured by hearing baby's heartbeat.  Pt coached to slow her breathing down, writhing in bed and continues to be unable to sit still.  UA obtained  0615  Dr Condon called with update, Pt status, BP, HR, UA reviewed, new orders received  0700  IV started by anesthesia, fluid bolus started, Pt medicated for nausea and pain, Pt starting to calm, report given to April RN  "

## 2017-03-03 NOTE — PROGRESS NOTES
Report from Triage RN, Gina Lombardi. 36 y.o.  EDC 17 EGA 28.3 here in room LDA3 by self c/o N/V since yesterday. Pt reports having 2 ruptured discs and has been taking Norco. Pt has not had a bowel movement in 8 days. Positive fetal movement, denies vaginal bleeding or LOF. Pt is Tachycardic with pulse in 130's. Afebrile. IV in LAC after 3 attempts. LR bolus running per MD order. Zofran & IM morphine given to pt by previous RN. 0913 Ducolax suppository given by pt, at pt request without relief. Dr. Vale at bedside Enema ordered.   1300 Enema administered by Pt in bathroom. Small hard bowel movement produced by enema. Dr. Vale again at bedside. Discharge order received. Pt given prescription for stool softener. Pt requesting prescription for narcotic for back pain. MD unwilling to give additional prescription. Pt to f/u at [facility]. Discharge order received. Pt educated on  labor precautions. Verbalized understanding. Discharged home, ambulatory.

## 2017-03-14 ENCOUNTER — DATING (OUTPATIENT)
Dept: OBGYN | Facility: CLINIC | Age: 37
End: 2017-03-14

## 2017-03-22 ENCOUNTER — ROUTINE PRENATAL (OUTPATIENT)
Dept: OBGYN | Facility: CLINIC | Age: 37
End: 2017-03-22
Payer: MEDICAID

## 2017-03-22 VITALS — SYSTOLIC BLOOD PRESSURE: 120 MMHG | DIASTOLIC BLOOD PRESSURE: 78 MMHG | BODY MASS INDEX: 35.23 KG/M2 | WEIGHT: 225 LBS

## 2017-03-22 DIAGNOSIS — O09.93 SUPERVISION OF HIGH RISK PREGNANCY, ANTEPARTUM, THIRD TRIMESTER: ICD-10-CM

## 2017-03-22 DIAGNOSIS — O36.8130 DECREASED FETAL MOVEMENT, THIRD TRIMESTER, NOT APPLICABLE OR UNSPECIFIED FETUS: ICD-10-CM

## 2017-03-22 LAB
NST ACOUSTIC STIMULATION: NORMAL
NST ACTION NECESSARY: NORMAL
NST ASSESSMENT: NORMAL
NST BASELINE: 140
NST INDICATIONS: NORMAL
NST OTHER DATA: NORMAL
NST READ BY: NORMAL
NST RETURN: NORMAL
NST UTERINE ACTIVITY: NO

## 2017-03-22 PROCEDURE — 90471 IMMUNIZATION ADMIN: CPT | Performed by: OBSTETRICS & GYNECOLOGY

## 2017-03-22 PROCEDURE — 90715 TDAP VACCINE 7 YRS/> IM: CPT | Performed by: OBSTETRICS & GYNECOLOGY

## 2017-03-22 PROCEDURE — 59025 FETAL NON-STRESS TEST: CPT | Performed by: OBSTETRICS & GYNECOLOGY

## 2017-03-22 PROCEDURE — 90040 PR PRENATAL FOLLOW UP: CPT | Performed by: OBSTETRICS & GYNECOLOGY

## 2017-03-22 RX ORDER — GABAPENTIN 300 MG/1
300 CAPSULE ORAL 3 TIMES DAILY
Qty: 90 CAP | Refills: 3 | Status: SHIPPED | OUTPATIENT
Start: 2017-03-22 | End: 2017-03-29 | Stop reason: SDUPTHER

## 2017-03-22 NOTE — Clinical Note
"Count Your Baby's Movements  Another step to a healthy delivery    Annabel Bethea             Dept: [phone]    How Many Weeks Pregnant? 31W2D    Date to Begin Counting: Today 03/22/2017              How to use this chart    One way for your physician to keep track of your baby's health is by knowing how often the baby moves (or \"kicks\") in your womb.  You can help your physician to do this by using this chart every day.    Every day, you should see how many hours it takes for your baby to move 10 times.  Start in the morning, as soon as you get up.    · First, write down the time your baby moves until you get to 10.  · Check off one box every time your baby moves until you get to 10.  · Write down the time you finished counting in the last column.  · Total how long it took to count up all 10 movements.  · Finally, fill in the box that shows how long this took.  After counting 10 movements, you no longer have to count any more that day.  The next morning, just start counting again as soon as you get up.    What should you call a \"movement\"?  It is hard to say, because it will feel different from one mother to another and from one pregnancy to the next.  The important thing is that you count the movements the same way throughout your pregnancy.  If you have more questions, you should ask your physician.    Count carefully every day!  SAMPLE:  Week 28    How many hours did it take to feel 10 movements?       Start  Time     1     2     3     4     5     6     7     8     9     10   Finish Time   Mon 8:20 ·  ·  ·  ·  ·  ·  ·  ·  ·  ·  11:40   Tue Wed Thu Fri               Sat               Sun                 IMPORTANT: You should contact your physician if it takes more than two hours for you to feel 10 movements.  Each morning, write down the time and start to count the movements of your baby.  Keep track by checking off one box every time you feel one movement.  When you " "have felt 10 \"kicks\", write down the time you finished counting in the last column.  Then fill in the   box (over the check gisselle) for the number of hours it took.  Be sure to read the complete instructions on the previous page.            "

## 2017-03-22 NOTE — PROGRESS NOTES
Annabel Bethea is a 36 y.o.  at 31w2d here today for obstetrical visit.  Patient is with complaints.    She reports poor fetal movement.  She denies vaginal bleeding.  She denies rupture of membranes.  She denies contractions.     has Supervision of high risk pregnancy, antepartum and History of gestational diabetes in prior pregnancy, currently pregnant on her problem list.    Discussion with neurosurgery at [facility], Dr. Recee  Patient is safe for vaginal delivery, vaginal delivery will not exacerbate patient's current spine problems  [facility] over the next week, patient is to call and make appointment. They will evaluate her current neurological status and make recommendations  Recommendation for gabapentin 300 mg by mouth 3 times a day for pain, this dose can be double in the next week if she does not improve  Will need to discuss with anesthesia management for pain relief during labor and/or necessary for   Consider induction at 39 weeks secondary to patient's pain  [facility] will see patient soon after delivery but does not need to be consult in-house after delivery      A/P IUP at 31w2d  AFP done  1 hour glucola done  Rhogam b pos  GBS     F/U in 1 weeks

## 2017-03-22 NOTE — MR AVS SNAPSHOT
Annabel Archibald    3/22/2017 3:30 PM   Routine Prenatal   MRN: [id number]    [facility]   Dept Phone:  [phone]    Description:  Female : 1980   Provider:  VENESSA TOLEDO           Allergies as of 3/22/2017     Allergen Noted Reactions    Peanut-Derived 2016         You were diagnosed with     Decreased fetal movement, third trimester, not applicable or unspecified fetus   [520760]         Vital Signs     Last Menstrual Period Smoking Status                08/15/2016 Former Smoker          Basic Information     Date Of Birth Sex Race Ethnicity Preferred Language    1980 Female Black or  Non- English      Your appointments     Mar 22, 2017  4:00 PM   OB Follow Up with Jen Grant M.D.   [facility] [address]  [facility] [address] [phone]              Problem List              ICD-10-CM Priority Class Noted - Resolved    Supervision of high risk pregnancy, antepartum O09.90   2016 - Present    History of gestational diabetes in prior pregnancy, currently pregnant O09.299, Z86.32   2016 - Present      Health Maintenance        Date Due Completion Dates    IMM DTaP/Tdap/Td Vaccine (1 - Tdap) 1999 ---    IMM INFLUENZA (1) 2016 ---    PAP SMEAR 2019            Results     POCT Fetal Nonstress Test      Component    NST Indications    decreased fm    NST Baseline    140    NST Uterine Activity    no    NST Acoustic Stimulation    appropriate for gest age    NST Assessment    NST Action Necessary    NST Other Data    NST Return    NST Read By                        Current Immunizations     Tdap Vaccine 3/22/2017  3:29 PM      Below and/or attached are the medications your provider expects you to take. Review all of your home medications and newly ordered medications with your provider and/or pharmacist. Follow medication instructions as directed by your provider and/or  pharmacist. Please keep your medication list with you and share with your provider. Update the information when medications are discontinued, doses are changed, or new medications (including over-the-counter products) are added; and carry medication information at all times in the event of emergency situations     Allergies:  PEANUT-DERIVED - (reactions not documented)               Medications  Valid as of: March 22, 2017 -  3:56 PM    Generic Name Brand Name Tablet Size Instructions for use    Acetaminophen   Take  by mouth.        Bisacodyl (Tablet Delayed Response) DULCOLAX 5 MG Take 2 Tabs by mouth as needed.        Doxylamine-Pyridoxine (Tablet Delayed Response) Doxylamine-Pyridoxine 10-10 MG Take 1 Tab by mouth 4 times a day as needed (nausea).        Gabapentin (Cap) NEURONTIN 300 MG Take 1 Cap by mouth 3 times a day.        Ondansetron HCl (Tab) ZOFRAN 4 MG Take 1 Tab by mouth every four hours as needed.        Oxycodone-Acetaminophen (Tab) PERCOCET 5-325 MG Take 1-2 Tabs by mouth every 6 hours as needed for Moderate Pain (pain).        .                 Medicines prescribed today were sent to:     [facility] [address] [place] [address] AT [facility] & [address] [address]    Phone: [phone] Fax: [phone]    Open 24 Hours?: No      Medication refill instructions:       If your prescription bottle indicates you have medication refills left, it is not necessary to call your provider’s office. Please contact your pharmacy and they will refill your medication.    If your prescription bottle indicates you do not have any refills left, you may request refills at any time through one of the following ways: The online CompareMyFare system (except Urgent Care), by calling your provider’s office, or by asking your pharmacy to contact your provider’s office with a refill request. Medication refills are processed only during regular business hours and may not be  available until the next business day. Your provider may request additional information or to have a follow-up visit with you prior to refilling your medication.   *Please Note: Medication refills are assigned a new Rx number when refilled electronically. Your pharmacy may indicate that no refills were authorized even though a new prescription for the same medication is available at the pharmacy. Please request the medicine by name with the pharmacy before contacting your provider for a refill.           Mobincube Access Code: [email or web address]  Expires: 4/21/2017  2:49 PM    Mobincube  A secure, online tool to manage your health information     [employer]’s Mobincube® is a secure, online tool that connects you to your personalized health information from the privacy of your home -- day or night - making it very easy for you to manage your healthcare. Once the activation process is completed, you can even access your medical information using the Mobincube tejas, which is available for free in the Apple Tejas store or Google Play store.     Mobincube provides the following levels of access (as shown below):   My Chart Features   [facility] Doctor [facility]  Specialists [facility]  Urgent  Care [facility]  Primary Care  Doctor   Email your healthcare team securely and privately 24/7 X X X    Manage appointments: schedule your next appointment; view details of past/upcoming appointments X      Request prescription refills. X      View recent personal medical records, including lab and immunizations X X X X   View health record, including health history, allergies, medications X X X X   Read reports about your outpatient visits, procedures, consult and ER notes X X X X   See your discharge summary, which is a recap of your hospital and/or ER visit that includes your diagnosis, lab results, and care plan. X X       How to register for Mobincube:  1. Go to  [email or web address].  2. Click on the Sign Up Now box, which takes  you to the New Member Sign Up page. You will need to provide the following information:  a. Enter your miDrive Access Code exactly as it appears at the top of this page. (You will not need to use this code after you’ve completed the sign-up process. If you do not sign up before the expiration date, you must request a new code.)   b. Enter your date of birth.   c. Enter your home email address.   d. Click Submit, and follow the next screen’s instructions.  3. Create a miDrive ID. This will be your miDrive login ID and cannot be changed, so think of one that is secure and easy to remember.  4. Create a miDrive password. You can change your password at any time.  5. Enter your Password Reset Question and Answer. This can be used at a later time if you forget your password.   6. Enter your e-mail address. This allows you to receive e-mail notifications when new information is available in miDrive.  7. Click Sign Up. You can now view your health information.    For assistance activating your miDrive account, call [phone]

## 2017-03-22 NOTE — MR AVS SNAPSHOT
Annabel Bethea   3/22/2017 4:00 PM   Routine Prenatal   MRN: [id number]    Department[facility]   Dept Phone:  [phone]    Description:  Female : 1980   Provider:  Jen Grant M.D.           Allergies as of 3/22/2017     Allergen Noted Reactions    Peanut-Derived 2016         You were diagnosed with     Supervision of high risk pregnancy, antepartum, third trimester   [[id number]]         Vital Signs     Blood Pressure Weight Last Menstrual Period Smoking Status          120/78 mmHg 102.059 kg (225 lb) 08/15/2016 Former Smoker        Basic Information     Date Of Birth Sex Race Ethnicity Preferred Language    1980 Female Black or  Non- English      Your appointments     Mar 22, 2017  4:00 PM   OB Follow Up with Jen Grant M.D.   [facility] [address]  [facility] [address] [phone]              Problem List              ICD-10-CM Priority Class Noted - Resolved    Supervision of high risk pregnancy, antepartum O09.90   2016 - Present    History of gestational diabetes in prior pregnancy, currently pregnant O09.299, Z86.32   2016 - Present      Health Maintenance        Date Due Completion Dates    IMM DTaP/Tdap/Td Vaccine (1 - Tdap) 1999 ---    IMM INFLUENZA (1) 2016 ---    PAP SMEAR 2019            Current Immunizations     Tdap Vaccine 3/22/2017  3:29 PM      Below and/or attached are the medications your provider expects you to take. Review all of your home medications and newly ordered medications with your provider and/or pharmacist. Follow medication instructions as directed by your provider and/or pharmacist. Please keep your medication list with you and share with your provider. Update the information when medications are discontinued, doses are changed, or new medications (including over-the-counter products) are added; and carry medication information at all  times in the event of emergency situations     Allergies:  PEANUT-DERIVED - (reactions not documented)               Medications  Valid as of: March 22, 2017 -  3:57 PM    Generic Name Brand Name Tablet Size Instructions for use    Acetaminophen   Take  by mouth.        Bisacodyl (Tablet Delayed Response) DULCOLAX 5 MG Take 2 Tabs by mouth as needed.        Doxylamine-Pyridoxine (Tablet Delayed Response) Doxylamine-Pyridoxine 10-10 MG Take 1 Tab by mouth 4 times a day as needed (nausea).        Gabapentin (Cap) NEURONTIN 300 MG Take 1 Cap by mouth 3 times a day.        Ondansetron HCl (Tab) ZOFRAN 4 MG Take 1 Tab by mouth every four hours as needed.        Oxycodone-Acetaminophen (Tab) PERCOCET 5-325 MG Take 1-2 Tabs by mouth every 6 hours as needed for Moderate Pain (pain).        .                 Medicines prescribed today were sent to:     [facility] [address] [place], [place] [address] [facility] & [address] [address]    Phone: [phone] Fax: [phone]    Open 24 Hours?: No      Medication refill instructions:       If your prescription bottle indicates you have medication refills left, it is not necessary to call your provider’s office. Please contact your pharmacy and they will refill your medication.    If your prescription bottle indicates you do not have any refills left, you may request refills at any time through one of the following ways: The online TheInfoPro system (except Urgent Care), by calling your provider’s office, or by asking your pharmacy to contact your provider’s office with a refill request. Medication refills are processed only during regular business hours and may not be available until the next business day. Your provider may request additional information or to have a follow-up visit with you prior to refilling your medication.   *Please Note: Medication refills are assigned a new Rx number when refilled electronically. Your pharmacy may  indicate that no refills were authorized even though a new prescription for the same medication is available at the pharmacy. Please request the medicine by name with the pharmacy before contacting your provider for a refill.        Your To Do List     Future Labs/Procedures Complete By Expires    GLUCOSE 1HR GESTATIONAL  As directed 3/23/2018    T.PALLIDUM AB EIA  As directed 3/23/2018         Synference Access Code: [email or web address]  Expires: 4/21/2017  2:49 PM    Synference  A secure, online tool to manage your health information     [employer]’s Synference® is a secure, online tool that connects you to your personalized health information from the privacy of your home -- day or night - making it very easy for you to manage your healthcare. Once the activation process is completed, you can even access your medical information using the Synference tejas, which is available for free in the Apple Tejas store or Google Play store.     Synference provides the following levels of access (as shown below):   My Chart Features   [facility] Primary Care Doctor [facility]  Specialists [facility]  Urgent  Care [facility]  Primary Care  Doctor   Email your healthcare team securely and privately 24/7 X X X    Manage appointments: schedule your next appointment; view details of past/upcoming appointments X      Request prescription refills. X      View recent personal medical records, including lab and immunizations X X X X   View health record, including health history, allergies, medications X X X X   Read reports about your outpatient visits, procedures, consult and ER notes X X X X   See your discharge summary, which is a recap of your hospital and/or ER visit that includes your diagnosis, lab results, and care plan. X X       How to register for Synference:  1. Go to  [email or web address].  2. Click on the Sign Up Now box, which takes you to the New Member Sign Up page. You will need to provide the following information:  a. Enter your Synference  Access Code exactly as it appears at the top of this page. (You will not need to use this code after you’ve completed the sign-up process. If you do not sign up before the expiration date, you must request a new code.)   b. Enter your date of birth.   c. Enter your home email address.   d. Click Submit, and follow the next screen’s instructions.  3. Create a Softgate Systems ID. This will be your Softgate Systems login ID and cannot be changed, so think of one that is secure and easy to remember.  4. Create a RecruitTalkt password. You can change your password at any time.  5. Enter your Password Reset Question and Answer. This can be used at a later time if you forget your password.   6. Enter your e-mail address. This allows you to receive e-mail notifications when new information is available in Softgate Systems.  7. Click Sign Up. You can now view your health information.    For assistance activating your Softgate Systems account, call [phone]

## 2017-03-22 NOTE — PROGRESS NOTES
Pt here today for OB follow up  Pt states baby is not moving as much.  WT: 225 lb  BP: 120/78  Pt states she was seen at [facility] ER due to pain on her left leg states she has 2 herniated discs.   Pt states she has been having diarrhea and vomiting for the 3 days.   Pt states she saw [facility] last week  for long steroid use.   GURWINDER sheet given and explained today  1 hr gtt and T.Pallidum lab slip given today with instructions.  Desires Tdap vaccine   Good # [phone]     Tdap vaccine given. Right Deltoid. VIS given and screening check list reviewed with pt.

## 2017-03-29 ENCOUNTER — ROUTINE PRENATAL (OUTPATIENT)
Dept: OBGYN | Facility: CLINIC | Age: 37
End: 2017-03-29
Payer: MEDICAID

## 2017-03-29 VITALS — BODY MASS INDEX: 35.98 KG/M2 | DIASTOLIC BLOOD PRESSURE: 70 MMHG | WEIGHT: 229.8 LBS | SYSTOLIC BLOOD PRESSURE: 118 MMHG

## 2017-03-29 DIAGNOSIS — O09.92 SUPERVISION OF HIGH RISK PREGNANCY, ANTEPARTUM, SECOND TRIMESTER: ICD-10-CM

## 2017-03-29 DIAGNOSIS — O09.93 SUPERVISION OF HIGH RISK PREGNANCY, ANTEPARTUM, THIRD TRIMESTER: ICD-10-CM

## 2017-03-29 PROCEDURE — 90040 PR PRENATAL FOLLOW UP: CPT | Performed by: OBSTETRICS & GYNECOLOGY

## 2017-03-29 RX ORDER — ACETAMINOPHEN AND CODEINE PHOSPHATE 300; 30 MG/1; MG/1
1-2 TABLET ORAL EVERY 4 HOURS PRN
Qty: 30 TAB | Refills: 0 | Status: SHIPPED | OUTPATIENT
Start: 2017-03-29 | End: 2017-07-12

## 2017-03-29 RX ORDER — GABAPENTIN 300 MG/1
600 CAPSULE ORAL 3 TIMES DAILY
Qty: 90 CAP | Refills: 3 | Status: SHIPPED | OUTPATIENT
Start: 2017-03-29 | End: 2017-05-19 | Stop reason: SDUPTHER

## 2017-03-29 NOTE — MR AVS SNAPSHOT
Annabel Bethea   3/29/2017 11:30 AM   Routine Prenatal   MRN: [id number]    [facility]   Dept Phone:  [phone]    Description:  Female : 1980   Provider:  Jen Grant M.D.           Allergies as of 3/29/2017     Allergen Noted Reactions    Peanut-Derived 2016         You were diagnosed with     Supervision of high risk pregnancy, antepartum, second trimester   [[id number]]       Supervision of high risk pregnancy, antepartum, third trimester   [[id number]]         Vital Signs     Blood Pressure Weight Last Menstrual Period Smoking Status          118/70 mmHg 104.237 kg (229 lb 12.8 oz) 08/15/2016 Former Smoker        Basic Information     Date Of Birth Sex Race Ethnicity Preferred Language    1980 Female Black or  Non- English      Your appointments     2017 11:30 AM   OB Follow Up with VENESSA GORDON   [facility] [address] [id number] [phone]              Problem List              ICD-10-CM Priority Class Noted - Resolved    Supervision of high risk pregnancy, antepartum O09.90   2016 - Present    History of gestational diabetes in prior pregnancy, currently pregnant O09.299, Z86.32   2016 - Present      Health Maintenance        Date Due Completion Dates    IMM INFLUENZA (1) 2016 ---    PAP SMEAR 2019    IMM DTaP/Tdap/Td Vaccine (2 - Td) 3/22/2027 3/22/2017            Current Immunizations     Tdap Vaccine 3/22/2017  3:29 PM      Below and/or attached are the medications your provider expects you to take. Review all of your home medications and newly ordered medications with your provider and/or pharmacist. Follow medication instructions as directed by your provider and/or pharmacist. Please keep your medication list with you and share with your provider. Update the information when medications are discontinued, doses are changed, or new medications (including  over-the-counter products) are added; and carry medication information at all times in the event of emergency situations     Allergies:  PEANUT-DERIVED - (reactions not documented)               Medications  Valid as of: March 29, 2017 - 11:57 AM    Generic Name Brand Name Tablet Size Instructions for use    Acetaminophen   Take  by mouth.        Acetaminophen-Codeine (Tab) Acetaminophen-Codeine 300-30 MG Take 1-2 Tabs by mouth every four hours as needed.        Bisacodyl (Tablet Delayed Response) DULCOLAX 5 MG Take 2 Tabs by mouth as needed.        Doxylamine-Pyridoxine (Tablet Delayed Response) Doxylamine-Pyridoxine 10-10 MG Take 1 Tab by mouth 4 times a day as needed (nausea).        Gabapentin (Cap) NEURONTIN 300 MG Take 2 Caps by mouth 3 times a day.        Ondansetron HCl (Tab) ZOFRAN 4 MG Take 1 Tab by mouth every four hours as needed.        Oxycodone-Acetaminophen (Tab) PERCOCET 5-325 MG Take 1-2 Tabs by mouth every 6 hours as needed for Moderate Pain (pain).        .                 Medicines prescribed today were sent to:     [facility] [address] [place] [address] & [address] [address]    Phone: [phone] Fax: [phone]    Open 24 Hours?: No      Medication refill instructions:       If your prescription bottle indicates you have medication refills left, it is not necessary to call your provider’s office. Please contact your pharmacy and they will refill your medication.    If your prescription bottle indicates you do not have any refills left, you may request refills at any time through one of the following ways: The online Conclusive Analytics system (except Urgent Care), by calling your provider’s office, or by asking your pharmacy to contact your provider’s office with a refill request. Medication refills are processed only during regular business hours and may not be available until the next business day. Your provider may request additional  information or to have a follow-up visit with you prior to refilling your medication.   *Please Note: Medication refills are assigned a new Rx number when refilled electronically. Your pharmacy may indicate that no refills were authorized even though a new prescription for the same medication is available at the pharmacy. Please request the medicine by name with the pharmacy before contacting your provider for a refill.           Teach Me To Be Access Code: [email or web address]  Expires: 4/21/2017  2:49 PM    Teach Me To Be  A secure, online tool to manage your health information     [employer]’s Teach Me To Be® is a secure, online tool that connects you to your personalized health information from the privacy of your home -- day or night - making it very easy for you to manage your healthcare. Once the activation process is completed, you can even access your medical information using the Teach Me To Be tejas, which is available for free in the Apple Tejas store or Google Play store.     Teach Me To Be provides the following levels of access (as shown below):   My Chart Features   [facility] Primary Care Doctor [facility]  Specialists [facility]  Urgent  Care [facility]  Primary Care  Doctor   Email your healthcare team securely and privately 24/7 X X X    Manage appointments: schedule your next appointment; view details of past/upcoming appointments X      Request prescription refills. X      View recent personal medical records, including lab and immunizations X X X X   View health record, including health history, allergies, medications X X X X   Read reports about your outpatient visits, procedures, consult and ER notes X X X X   See your discharge summary, which is a recap of your hospital and/or ER visit that includes your diagnosis, lab results, and care plan. X X       How to register for Teach Me To Be:  1. Go to  [email or web address]  2. Click on the Sign Up Now box, which takes you to the New Member Sign Up page. You will need to provide the following  information:  a. Enter your Enertiv Access Code exactly as it appears at the top of this page. (You will not need to use this code after you’ve completed the sign-up process. If you do not sign up before the expiration date, you must request a new code.)   b. Enter your date of birth.   c. Enter your home email address.   d. Click Submit, and follow the next screen’s instructions.  3. Create a Enertiv ID. This will be your Enertiv login ID and cannot be changed, so think of one that is secure and easy to remember.  4. Create a Enertiv password. You can change your password at any time.  5. Enter your Password Reset Question and Answer. This can be used at a later time if you forget your password.   6. Enter your e-mail address. This allows you to receive e-mail notifications when new information is available in Enertiv.  7. Click Sign Up. You can now view your health information.    For assistance activating your Enertiv account, call [phone]

## 2017-03-29 NOTE — PROGRESS NOTES
Annabel Bethea is a 36 y.o.  at 32w2d here today for obstetrical visit.  Patient is without complaints.    She reports good fetal movement.  She denies vaginal bleeding.  She denies rupture of membranes.  She denies contractions.     has Supervision of high risk pregnancy, antepartum and History of gestational diabetes in prior pregnancy, currently pregnant on her problem list.    Patient states she is feeling much much better on Neurontin, desires double of dose only because she still has discomfort with walking and standing. Otherwise she states she is feeling well. She also desires some pain medication for intermittent pain. She does not want Vicodin as it made her too constipated, desires Tylenol No. 3  Patient was seen by perinatology and released from their care patient will need fetal cardiac echo as well as checking of palate on fetus secondary to multiple doses of steroids in first and second trimesters    Patient is also requesting a 39 week induction of labor secondary to chronic back pain. Patient has good dates and is multiparous no reason why patient cannot have elective 39 week induction      A/P IUP at 32w2d  AFP done  1 hour dora patient did not have time to get this done as she ran out of gas  Rhogam done  GBS     F/U in 2 weeks

## 2017-03-29 NOTE — PROGRESS NOTES
OB f/u   + fetal movement.  No VB, LOF or UC's.  Good phone # [phone]  Preferred pharmacy confirmed.  Pt c/o Ramana Irvin  1 hr GTT not done yet; will do it this week, order reprinted and given to patient

## 2017-04-11 ENCOUNTER — ROUTINE PRENATAL (OUTPATIENT)
Dept: OBGYN | Facility: CLINIC | Age: 37
End: 2017-04-11
Payer: MEDICAID

## 2017-04-11 VITALS — SYSTOLIC BLOOD PRESSURE: 122 MMHG | DIASTOLIC BLOOD PRESSURE: 74 MMHG | BODY MASS INDEX: 36.8 KG/M2 | WEIGHT: 235 LBS

## 2017-04-11 DIAGNOSIS — Z3A.34 34 WEEKS GESTATION OF PREGNANCY: ICD-10-CM

## 2017-04-11 PROCEDURE — 90040 PR PRENATAL FOLLOW UP: CPT | Performed by: OBSTETRICS & GYNECOLOGY

## 2017-04-11 NOTE — PROGRESS NOTES
Patient reports no bleeding, has recurrent  cramping, no leaking     Fetal Movement: normal      Uterine Size: S=D     Fetal Position: Cephalic    Assessment:      Pregnancy 34 weeks      Prior GDMA@ badly controlled   pt has not done GCT is apparently eating healthy. Is not felling the same way.  Counseling and Plan:       28-week labs reviewed, not done slip given   GBS done today since pt is anxious is ni labor.          Follow-up: 2 Weeks

## 2017-04-11 NOTE — PROGRESS NOTES
OB f/u   + fetal movement.  No VB, LOF   Good phone # [phone]  Preferred pharmacy confirmed  1 hr GTT not done yet; order reprinted and given to patient; pt made appt with [facility] over the phone; has appt tomorrow morning  Pt reports has been having UC since Sunday 4-2-17 every 12-15 min apart

## 2017-04-11 NOTE — MR AVS SNAPSHOT
Annabel Bethea   2017 11:30 AM   Routine Prenatal   MRN: [id number]    [facility]   Dept Phone:  [phone]    Description:  Female : 1980   Provider:  Jacey Monet M.D.           Allergies as of 2017     Allergen Noted Reactions    Peanut-Derived 2016         You were diagnosed with     34 weeks gestation of pregnancy   [[id number]]         Vital Signs     Blood Pressure Weight Last Menstrual Period Smoking Status          122/74 mmHg 106.595 kg (235 lb) 08/15/2016 Former Smoker        Basic Information     Date Of Birth Sex Race Ethnicity Preferred Language    1980 Female Black or  Non- English      Problem List              ICD-10-CM Priority Class Noted - Resolved    Supervision of high risk pregnancy, antepartum O09.90   2016 - Present    History of gestational diabetes in prior pregnancy, currently pregnant O09.299, Z86.32   2016 - Present      Health Maintenance        Date Due Completion Dates    PAP SMEAR 2019    IMM DTaP/Tdap/Td Vaccine (2 - Td) 3/22/2027 3/22/2017            Current Immunizations     Tdap Vaccine 3/22/2017  3:29 PM      Below and/or attached are the medications your provider expects you to take. Review all of your home medications and newly ordered medications with your provider and/or pharmacist. Follow medication instructions as directed by your provider and/or pharmacist. Please keep your medication list with you and share with your provider. Update the information when medications are discontinued, doses are changed, or new medications (including over-the-counter products) are added; and carry medication information at all times in the event of emergency situations     Allergies:  PEANUT-DERIVED - (reactions not documented)               Medications  Valid as of: 2017 - 11:31 AM    Generic Name Brand Name Tablet Size Instructions for use    Acetaminophen   Take   by mouth.        Acetaminophen-Codeine (Tab) Acetaminophen-Codeine 300-30 MG Take 1-2 Tabs by mouth every four hours as needed.        Bisacodyl (Tablet Delayed Response) DULCOLAX 5 MG Take 2 Tabs by mouth as needed.        Doxylamine-Pyridoxine (Tablet Delayed Response) Doxylamine-Pyridoxine 10-10 MG Take 1 Tab by mouth 4 times a day as needed (nausea).        Gabapentin (Cap) NEURONTIN 300 MG Take 2 Caps by mouth 3 times a day.        Ondansetron HCl (Tab) ZOFRAN 4 MG Take 1 Tab by mouth every four hours as needed.        Oxycodone-Acetaminophen (Tab) PERCOCET 5-325 MG Take 1-2 Tabs by mouth every 6 hours as needed for Moderate Pain (pain).        .                 Medicines prescribed today were sent to:     [facility] [address] [place] [address] & [address] [address]    Phone: [phone] Fax: [phone]    Open 24 Hours?: No      Medication refill instructions:       If your prescription bottle indicates you have medication refills left, it is not necessary to call your provider’s office. Please contact your pharmacy and they will refill your medication.    If your prescription bottle indicates you do not have any refills left, you may request refills at any time through one of the following ways: The online Brayola system (except Urgent Care), by calling your provider’s office, or by asking your pharmacy to contact your provider’s office with a refill request. Medication refills are processed only during regular business hours and may not be available until the next business day. Your provider may request additional information or to have a follow-up visit with you prior to refilling your medication.   *Please Note: Medication refills are assigned a new Rx number when refilled electronically. Your pharmacy may indicate that no refills were authorized even though a new prescription for the same medication is available at the pharmacy. Please  request the medicine by name with the pharmacy before contacting your provider for a refill.        Your To Do List     Future Labs/Procedures Complete By Expires    HCT  As directed 4/12/2018    HGB  As directed 4/12/2018         Enders Fundt Access Code: [email or web address]  Expires: 4/21/2017  2:49 PM    Enders Fundt  A secure, online tool to manage your health information     [employer]’s EdCourage® is a secure, online tool that connects you to your personalized health information from the privacy of your home -- day or night - making it very easy for you to manage your healthcare. Once the activation process is completed, you can even access your medical information using the EdCourage tejas, which is available for free in the Apple Tejas store or Google Play store.     EdCourage provides the following levels of access (as shown below):   My Chart Features   [facility] Doctor [facility]  Urgent  Care Non-[facility]  Primary Care  Doctor   Email your healthcare team securely and privately 24/7 X X X    Manage appointments: schedule your next appointment; view details of past/upcoming appointments X      Request prescription refills. X      View recent personal medical records, including lab and immunizations X X X X   View health record, including health history, allergies, medications X X X X   Read reports about your outpatient visits, procedures, consult and ER notes X X X X   See your discharge summary, which is a recap of your hospital and/or ER visit that includes your diagnosis, lab results, and care plan. X X       How to register for EdCourage:  1. Go to  [email or web address].  2. Click on the Sign Up Now box, which takes you to the New Member Sign Up page. You will need to provide the following information:  a. Enter your EdCourage Access Code exactly as it appears at the top of this page. (You will not need to use this code after you’ve completed the sign-up process. If you do not sign up before  the expiration date, you must request a new code.)   b. Enter your date of birth.   c. Enter your home email address.   d. Click Submit, and follow the next screen’s instructions.  3. Create a Prescient [employer] ID. This will be your Pres[employer] login ID and cannot be changed, so think of one that is secure and easy to remember.  4. Create a SpoonRockett password. You can change your password at any time.  5. Enter your Password Reset Question and Answer. This can be used at a later time if you forget your password.   6. Enter your e-mail address. This allows you to receive e-mail notifications when new information is available in [employer].  7. Click Sign Up. You can now view your health information.    For assistance activating your [employer] account, call [phone]

## 2017-04-18 ENCOUNTER — TELEPHONE (OUTPATIENT)
Dept: OBGYN | Facility: CLINIC | Age: 37
End: 2017-04-18

## 2017-04-18 NOTE — TELEPHONE ENCOUNTER
Dx GDM per Dr. Hahn. MD.  Pt notified of Dx of GDM and needs to go to GDM class and f/u at [facility] with MD. GDM class scheduled for 4/20/17 @ 0930 to see Wendy and GDM f/u at [facility] on 4/27/17 @ 1300 Address and phone to GDM class given to pt. Pt aware to bring in her log book and meter and will need to provide UA sample. Pt Verbalized understanding.

## 2017-04-20 ENCOUNTER — NON-PROVIDER VISIT (OUTPATIENT)
Dept: OBGYN | Facility: CLINIC | Age: 37
End: 2017-04-20
Payer: MEDICAID

## 2017-04-20 ENCOUNTER — ROUTINE PRENATAL (OUTPATIENT)
Dept: OBGYN | Facility: CLINIC | Age: 37
End: 2017-04-20
Payer: MEDICAID

## 2017-04-20 VITALS — WEIGHT: 229 LBS | SYSTOLIC BLOOD PRESSURE: 126 MMHG | BODY MASS INDEX: 35.86 KG/M2 | DIASTOLIC BLOOD PRESSURE: 84 MMHG

## 2017-04-20 VITALS — WEIGHT: 229.9 LBS | BODY MASS INDEX: 36 KG/M2

## 2017-04-20 DIAGNOSIS — O09.93 SUPERVISION OF HIGH RISK PREGNANCY, ANTEPARTUM, THIRD TRIMESTER: ICD-10-CM

## 2017-04-20 DIAGNOSIS — M54.9 OTHER CHRONIC BACK PAIN: ICD-10-CM

## 2017-04-20 DIAGNOSIS — G89.29 OTHER CHRONIC BACK PAIN: ICD-10-CM

## 2017-04-20 DIAGNOSIS — O24.419 GESTATIONAL DIABETES MELLITUS (GDM), ANTEPARTUM, GESTATIONAL DIABETES METHOD OF CONTROL UNSPECIFIED: ICD-10-CM

## 2017-04-20 DIAGNOSIS — O24.419 GESTATIONAL DIABETES MELLITUS (GDM) IN THIRD TRIMESTER, GESTATIONAL DIABETES METHOD OF CONTROL UNSPECIFIED: ICD-10-CM

## 2017-04-20 PROBLEM — M54.40 CHRONIC LOW BACK PAIN WITH SCIATICA: Status: ACTIVE | Noted: 2017-04-20

## 2017-04-20 LAB
APPEARANCE UR: ABNORMAL
BILIRUB UR STRIP-MCNC: ABNORMAL MG/DL
COLOR UR AUTO: ABNORMAL
GLUCOSE UR STRIP.AUTO-MCNC: ABNORMAL MG/DL
KETONES UR STRIP.AUTO-MCNC: ABNORMAL MG/DL
LEUKOCYTE ESTERASE UR QL STRIP.AUTO: ABNORMAL
NITRITE UR QL STRIP.AUTO: ABNORMAL
PH UR STRIP.AUTO: 100 [PH] (ref 5–8)
PROT UR QL STRIP: ABNORMAL MG/DL
RBC UR QL AUTO: ABNORMAL
SP GR UR STRIP.AUTO: 1.02
UROBILINOGEN UR STRIP-MCNC: ABNORMAL MG/DL

## 2017-04-20 PROCEDURE — 90040 PR PRENATAL FOLLOW UP: CPT | Performed by: OBSTETRICS & GYNECOLOGY

## 2017-04-20 PROCEDURE — 81002 URINALYSIS NONAUTO W/O SCOPE: CPT | Performed by: OBSTETRICS & GYNECOLOGY

## 2017-04-20 RX ORDER — ACETAMINOPHEN AND CODEINE PHOSPHATE 300; 30 MG/1; MG/1
1-2 TABLET ORAL EVERY 4 HOURS PRN
Qty: 30 TAB | Refills: 0 | Status: ON HOLD | OUTPATIENT
Start: 2017-04-20 | End: 2017-05-15

## 2017-04-20 RX ORDER — GABAPENTIN 300 MG/1
300 CAPSULE ORAL 3 TIMES DAILY
Qty: 90 CAP | Refills: 0 | Status: ON HOLD | OUTPATIENT
Start: 2017-04-20 | End: 2017-05-15

## 2017-04-20 NOTE — MR AVS SNAPSHOT
Annabel Bethea   2017 9:30 AM   Non-Provider Visit   MRN: [id number]    Department[facility]   Dept Phone:  [phone]    Description:  Female : 1980   Provider:  PC NURSE           Reason for Visit     Gestational Diabetes           Allergies as of 2017     Allergen Noted Reactions    Peanut-Derived 2016         You were diagnosed with     Gestational diabetes mellitus (GDM) in third trimester, gestational diabetes method of control unspecified   [[id number]]         Vital Signs     Weight Last Menstrual Period Smoking Status             104.282 kg (229 lb 14.4 oz) 08/15/2016 Former Smoker         Basic Information     Date Of Birth Sex Race Ethnicity Preferred Language    1980 Female Black or  Non- English      Your appointments     2017  9:30 AM   Diabetic with VENESSA GORDON   [facility] [address]  [facility] [address] [phone]              Problem List              ICD-10-CM Priority Class Noted - Resolved    Supervision of high risk pregnancy, antepartum O09.90   2016 - Present    History of gestational diabetes in prior pregnancy, currently pregnant O09.299, Z86.32   2016 - Present    Chronic low back pain with sciatica M54.40, G89.29   2017 - Present      Health Maintenance        Date Due Completion Dates    PAP SMEAR 2019    IMM DTaP/Tdap/Td Vaccine (2 - Td) 3/22/2027 3/22/2017            Current Immunizations     Tdap Vaccine 3/22/2017  3:29 PM      Below and/or attached are the medications your provider expects you to take. Review all of your home medications and newly ordered medications with your provider and/or pharmacist. Follow medication instructions as directed by your provider and/or pharmacist. Please keep your medication list with you and share with your provider. Update the information when medications are discontinued, doses are changed, or new  medications (including over-the-counter products) are added; and carry medication information at all times in the event of emergency situations     Allergies:  PEANUT-DERIVED - (reactions not documented)               Medications  Valid as of: April 20, 2017 - 11:12 AM    Generic Name Brand Name Tablet Size Instructions for use    Acetaminophen   Take  by mouth.        Acetaminophen-Codeine (Tab) Acetaminophen-Codeine 300-30 MG Take 1-2 Tabs by mouth every four hours as needed.        Acetaminophen-Codeine (Tab) Acetaminophen-Codeine 300-30 MG Take 1-2 Tabs by mouth every four hours as needed.        Bisacodyl (Tablet Delayed Response) DULCOLAX 5 MG Take 2 Tabs by mouth as needed.        Doxylamine-Pyridoxine (Tablet Delayed Response) Doxylamine-Pyridoxine 10-10 MG Take 1 Tab by mouth 4 times a day as needed (nausea).        Gabapentin (Cap) NEURONTIN 300 MG Take 2 Caps by mouth 3 times a day.        Gabapentin (Cap) NEURONTIN 300 MG Take 1 Cap by mouth 3 times a day.        Glucose Blood (Strip) glucose blood  Patient to check blood sugar 4 times a day.        Lancets (Misc) ONETOUCH DELICA LANCETS FINE  1 Stick by Does not apply route 4 times a day.        Ondansetron HCl (Tab) ZOFRAN 4 MG Take 1 Tab by mouth every four hours as needed.        Oxycodone-Acetaminophen (Tab) PERCOCET 5-325 MG Take 1-2 Tabs by mouth every 6 hours as needed for Moderate Pain (pain).        .                 Medicines prescribed today were sent to:     [facility] [address] [place] [address] - [address] [place] [address] AT [facility] & [address] [address]    Phone: [phone] Fax: [phone]    Open 24 Hours?: No      Medication refill instructions:       If your prescription bottle indicates you have medication refills left, it is not necessary to call your provider’s office. Please contact your pharmacy and they will refill your medication.    If your prescription bottle indicates you do not have any refills  left, you may request refills at any time through one of the following ways: The online joblocal system (except Urgent Care), by calling your provider’s office, or by asking your pharmacy to contact your provider’s office with a refill request. Medication refills are processed only during regular business hours and may not be available until the next business day. Your provider may request additional information or to have a follow-up visit with you prior to refilling your medication.   *Please Note: Medication refills are assigned a new Rx number when refilled electronically. Your pharmacy may indicate that no refills were authorized even though a new prescription for the same medication is available at the pharmacy. Please request the medicine by name with the pharmacy before contacting your provider for a refill.           joblocal Access Code: [email or web address]  Expires: 4/21/2017  2:49 PM    joblocal  A secure, online tool to manage your health information     [employer]’s joblocal® is a secure, online tool that connects you to your personalized health information from the privacy of your home -- day or night - making it very easy for you to manage your healthcare. Once the activation process is completed, you can even access your medical information using the joblocal tejas, which is available for free in the Apple Tejas store or Google Play store.     joblocal provides the following levels of access (as shown below):   My Chart Features   [facility] Doctor [facility]  Urgent  Care Non-[facility]  Primary Care  Doctor   Email your healthcare team securely and privately 24/7 X X X    Manage appointments: schedule your next appointment; view details of past/upcoming appointments X      Request prescription refills. X      View recent personal medical records, including lab and immunizations X X X X   View health record, including health history, allergies, medications X X X X   Read reports about  your outpatient visits, procedures, consult and ER notes X X X X   See your discharge summary, which is a recap of your hospital and/or ER visit that includes your diagnosis, lab results, and care plan. X X       How to register for Digital Karma:  1. Go to  [email or web address].  2. Click on the Sign Up Now box, which takes you to the New Member Sign Up page. You will need to provide the following information:  a. Enter your Digital Karma Access Code exactly as it appears at the top of this page. (You will not need to use this code after you’ve completed the sign-up process. If you do not sign up before the expiration date, you must request a new code.)   b. Enter your date of birth.   c. Enter your home email address.   d. Click Submit, and follow the next screen’s instructions.  3. Create a Digital Karma ID. This will be your Digital Karma login ID and cannot be changed, so think of one that is secure and easy to remember.  4. Create a Digital Karma password. You can change your password at any time.  5. Enter your Password Reset Question and Answer. This can be used at a later time if you forget your password.   6. Enter your e-mail address. This allows you to receive e-mail notifications when new information is available in Digital Karma.  7. Click Sign Up. You can now view your health information.    For assistance activating your Digital Karma account, call [phone]

## 2017-04-20 NOTE — PROGRESS NOTES
S: Doing well. (+) chronic back pain with sciatica  positive fetal movement.  negative vaginal bleeding.  negative leakage of fluid.  negative contractions.  negative headache. negative nausea/vomiting.  negative RUQ pain.  negative vision changes.     O: VSS Afeb        A/P:  36 y.o.  35w3d by last menstrual period which is consistent with an ultrasound with new GDM who presents for obstetric follow-up.    1.  Labs: HgbA1C- ordered   2.  Meal plan and diabetic education done with Wendy today  3. Continue prenatal vitamins.  4. Continue fetal kick counts   5.  Watch weight gain.  6.  Increase water intake.  7.  Encouraged prenatal classes.  8.  IOL at 39 weeks  9.  Follow-up in 1 week for obsetric follow-up.

## 2017-04-20 NOTE — PROGRESS NOTES
[facility] for gestational diabetes education.  She was provided a One Touch Verio meter requiring Delica Lancets that should be covered by her insurance.  She was given prescription for lancets and strips.  She was also given an order for a HbA1c.  Annabel had gestational diabetes in her last two pregnancies but didn't get tested this time until late.  She will probably require insulin.  Her blood glucose today fasting was 177 mg/dl. She will follow up here in one week.  The education letter is found under the Letters tab and her meal plan is scanned into Media.

## 2017-04-20 NOTE — Clinical Note
April 20, 2017                   Re: Annabel Bethea    1980         [id number]       [facility]  [address] [place] [address] (8)  [place] [address]      Dear :[facility], M.D.    On 4/20/2017, your patient Annabel Bethea, received 1.5 hours of nutrition training from the [facility] at [facility] for management of her gestational diabetes.  Her EDC is Estimated Date of Delivery: 5/22/17.  We taught the following subjects:    Introduction to gestational diabetes, benefits and responsibilities of patient, physiology of diabetes and the diease process, benefits of blood glucose monitoring and record keeping, medication action and possible side effects, hypoglycemia, exercise, with diabetes.       Nurse assessment / Education:    Comments:    BP:    Edema:no      Weight:Weight: 104.282 kg (229 lb 14.4 oz)         Complaints:yes - Her back pain      Pathophysiology of diabetes in pregnancy    Discuss  potential maternal and fetal complications in pregnancy with diabetes.     Importance of blood glucose monitoring   Proper testing technique using a One Touch Verio IQ meter.    At 9:15 , the meter read 177, which was fasting.  Testing: fasting and one hour after meals,  expected ranges and rationale for strict control.       Recognition and treatment of hypoglycemia.     Insulin taught: No  Insulin briefly dicussed at this time.    Should patient require insulin later in pregnancy, she would need further education.   Insulin taught: Yes          Patient is performaing fetal kick counts and other tests to determine fetal well-being  Discuss benefits and risks of exercise in pregnancy-pt unable to walk due to back pain.  Discuss when to call Doctor  Importance of wearing diabetes identification    Patient provided 2100 calorie meal plan with 3 meals and 3 snacks.   223 grams carbohydrate,   130 grams protein,   79 grams fat  Importance of meal planning in diabetes management during  pregnancy  Importance of consistent timing of meals and snacks and agreed upon times  Avoidance of simple carbohydrates  Metabolism of food components relating to pregnancy  Identification of foods in food groups  Patient demonstrates adequate ability to utilize meal planning manual for reference  Plan 3 meals and 3 snacks with 90% accuracy  Review basic principles of eating out  Reviewed precautions with artificial sweeteners  Comments:  Annabel agreed to follow the meal plan and to eat at the times agreed upon.  She will check blood glucose 4 times a day and record the values in her log book.      Patient/caregiver appeared to understand the content as demonstrated by appropriate questions.     Hopefully this will help in your management of her care.  If we can be of further assistance, please feel free to call.    Thank you for the referral.    Sincerely,  Wendy Galan RD, CDE  Certified Diabetes Educator

## 2017-04-20 NOTE — MR AVS SNAPSHOT
Annabel Archibald    2017 10:00 AM   Routine Prenatal   MRN: [id number]    [facility]   Dept Phone:  [phone]    Description:  Female : 1980   Provider:  Suze Ibrahim M.D.           Allergies as of 2017     Allergen Noted Reactions    Peanut-Derived 2016         You were diagnosed with     Other chronic back pain   [0536770]       Supervision of high risk pregnancy, antepartum, third trimester   [9794612]       Gestational diabetes mellitus (GDM), antepartum, gestational diabetes method of control unspecified   [1632713]         Vital Signs     Blood Pressure Weight Last Menstrual Period Smoking Status          126/84 mmHg 103.874 kg (229 lb) 08/15/2016 Former Smoker        Basic Information     Date Of Birth Sex Race Ethnicity Preferred Language    1980 Female Black or  Non- English      Your appointments     2017  9:30 AM   Diabetic with VENESSA GORDON   [facility] [address] [id number] [phone]              Problem List              ICD-10-CM Priority Class Noted - Resolved    Supervision of high risk pregnancy, antepartum O09.90   2016 - Present    History of gestational diabetes in prior pregnancy, currently pregnant O09.299, Z86.32   2016 - Present    Chronic low back pain with sciatica M54.40, G89.29   2017 - Present      Health Maintenance        Date Due Completion Dates    PAP SMEAR 2019    IMM DTaP/Tdap/Td Vaccine (2 - Td) 3/22/2027 3/22/2017            Results     POCT Urinalysis      Component Value Standard Range & Units    POC Color  Negative    POC Appearance  Negative    POC Leukocyte Esterase trace Negative    POC Nitrites neg Negative    POC Urobiligen  Negative (0.2) mg/dL    POC Protein neg Negative mg/dL    POC Urine  5.0 - 8.0    POC Blood neg Negative    POC Specific Gravity 1.025 <1.005 - >1.030    POC Ketones  neg Negative mg/dL    POC Biliruben  Negative mg/dL    POC Glucose neg Negative mg/dL                        Current Immunizations     Tdap Vaccine 3/22/2017  3:29 PM      Below and/or attached are the medications your provider expects you to take. Review all of your home medications and newly ordered medications with your provider and/or pharmacist. Follow medication instructions as directed by your provider and/or pharmacist. Please keep your medication list with you and share with your provider. Update the information when medications are discontinued, doses are changed, or new medications (including over-the-counter products) are added; and carry medication information at all times in the event of emergency situations     Allergies:  PEANUT-DERIVED - (reactions not documented)               Medications  Valid as of: April 20, 2017 - 11:13 AM    Generic Name Brand Name Tablet Size Instructions for use    Acetaminophen   Take  by mouth.        Acetaminophen-Codeine (Tab) Acetaminophen-Codeine 300-30 MG Take 1-2 Tabs by mouth every four hours as needed.        Acetaminophen-Codeine (Tab) Acetaminophen-Codeine 300-30 MG Take 1-2 Tabs by mouth every four hours as needed.        Bisacodyl (Tablet Delayed Response) DULCOLAX 5 MG Take 2 Tabs by mouth as needed.        Doxylamine-Pyridoxine (Tablet Delayed Response) Doxylamine-Pyridoxine 10-10 MG Take 1 Tab by mouth 4 times a day as needed (nausea).        Gabapentin (Cap) NEURONTIN 300 MG Take 2 Caps by mouth 3 times a day.        Gabapentin (Cap) NEURONTIN 300 MG Take 1 Cap by mouth 3 times a day.        Glucose Blood (Strip) glucose blood  Patient to check blood sugar 4 times a day.        Lancets (Misc) ONETOUCH DELICA LANCETS FINE  1 Stick by Does not apply route 4 times a day.        Ondansetron HCl (Tab) ZOFRAN 4 MG Take 1 Tab by mouth every four hours as needed.        Oxycodone-Acetaminophen (Tab) PERCOCET 5-325 MG Take 1-2 Tabs by mouth every 6 hours as needed  for Moderate Pain (pain).        .                 Medicines prescribed today were sent to:     [facility], [place] - [address] [facility] AT [facility]    [address] [facility] [place] [address]    Phone: [phone] Fax: [phone]    Open 24 Hours?: No      Medication refill instructions:       If your prescription bottle indicates you have medication refills left, it is not necessary to call your provider’s office. Please contact your pharmacy and they will refill your medication.    If your prescription bottle indicates you do not have any refills left, you may request refills at any time through one of the following ways: The online Telly system (except Urgent Care), by calling your provider’s office, or by asking your pharmacy to contact your provider’s office with a refill request. Medication refills are processed only during regular business hours and may not be available until the next business day. Your provider may request additional information or to have a follow-up visit with you prior to refilling your medication.   *Please Note: Medication refills are assigned a new Rx number when refilled electronically. Your pharmacy may indicate that no refills were authorized even though a new prescription for the same medication is available at the pharmacy. Please request the medicine by name with the pharmacy before contacting your provider for a refill.        Your To Do List     Future Labs/Procedures Complete By Expires    INDUCTION OF LABOR  As directed 4/20/2018         Telly Access Code: [email or web address]  Expires: 4/21/2017  2:49 PM    Telly  A secure, online tool to manage your health information     [employer]’s Telly® is a secure, online tool that connects you to your personalized health information from the privacy of your home -- day or night - making it very easy for you to manage your healthcare. Once the activation process is completed, you can even  access your medical information using the Matterport tejas, which is available for free in the Apple Tejas store or Google Play store.     Matterport provides the following levels of access (as shown below):   My Chart Features   [facility] Doctor [facility]  Specialists [facility]  Urgent  Care Non-[facility]  Primary Care  Doctor   Email your healthcare team securely and privately 24/7 X X X    Manage appointments: schedule your next appointment; view details of past/upcoming appointments X      Request prescription refills. X      View recent personal medical records, including lab and immunizations X X X X   View health record, including health history, allergies, medications X X X X   Read reports about your outpatient visits, procedures, consult and ER notes X X X X   See your discharge summary, which is a recap of your hospital and/or ER visit that includes your diagnosis, lab results, and care plan. X X       How to register for Matterport:  1. Go to  [email or web address].  2. Click on the Sign Up Now box, which takes you to the New Member Sign Up page. You will need to provide the following information:  a. Enter your Matterport Access Code exactly as it appears at the top of this page. (You will not need to use this code after you’ve completed the sign-up process. If you do not sign up before the expiration date, you must request a new code.)   b. Enter your date of birth.   c. Enter your home email address.   d. Click Submit, and follow the next screen’s instructions.  3. Create a Matterport ID. This will be your Matterport login ID and cannot be changed, so think of one that is secure and easy to remember.  4. Create a Matterport password. You can change your password at any time.  5. Enter your Password Reset Question and Answer. This can be used at a later time if you forget your password.   6. Enter your e-mail address. This allows you to receive e-mail notifications when new information is available in Matterport.  7. Click  Sign Up. You can now view your health information.    For assistance activating your [employer] account, call [phone]

## 2017-04-20 NOTE — PROGRESS NOTES
New GDM  +FM  Pt c/o pain starting from her buttocks down left leg.  GBS-negative  [id number] lab slip given to pt today  Good phone [phone]

## 2017-04-27 ENCOUNTER — ROUTINE PRENATAL (OUTPATIENT)
Dept: OBGYN | Facility: CLINIC | Age: 37
End: 2017-04-27
Payer: MEDICAID

## 2017-04-27 VITALS — BODY MASS INDEX: 37.27 KG/M2 | WEIGHT: 238 LBS | DIASTOLIC BLOOD PRESSURE: 70 MMHG | SYSTOLIC BLOOD PRESSURE: 110 MMHG

## 2017-04-27 DIAGNOSIS — O09.93 SUPERVISION OF HIGH RISK PREGNANCY, ANTEPARTUM, THIRD TRIMESTER: ICD-10-CM

## 2017-04-27 DIAGNOSIS — O24.410 DIET CONTROLLED GESTATIONAL DIABETES MELLITUS IN THIRD TRIMESTER: ICD-10-CM

## 2017-04-27 LAB
APPEARANCE UR: NORMAL
BILIRUB UR STRIP-MCNC: NORMAL MG/DL
COLOR UR AUTO: NORMAL
GLUCOSE UR STRIP.AUTO-MCNC: NEGATIVE MG/DL
KETONES UR STRIP.AUTO-MCNC: NORMAL MG/DL
LEUKOCYTE ESTERASE UR QL STRIP.AUTO: NORMAL
NITRITE UR QL STRIP.AUTO: NEGATIVE
PH UR STRIP.AUTO: 7 [PH] (ref 5–8)
PROT UR QL STRIP: NORMAL MG/DL
RBC UR QL AUTO: NEGATIVE
SP GR UR STRIP.AUTO: 1
UROBILINOGEN UR STRIP-MCNC: NORMAL MG/DL

## 2017-04-27 PROCEDURE — 90040 PR PRENATAL FOLLOW UP: CPT | Performed by: OBSTETRICS & GYNECOLOGY

## 2017-04-27 PROCEDURE — 81002 URINALYSIS NONAUTO W/O SCOPE: CPT | Performed by: OBSTETRICS & GYNECOLOGY

## 2017-04-27 NOTE — PROGRESS NOTES
Pt here for OB/FU GDM Reports Good Fetal Movement.  Pt c/o U/Cs, denies any other complications   Pt forgot blood sugars log book today. States she had 136 level of fasting and 160 after meals.   Pt has not done HgA1c labs, unsure of when she'll get them done.   # [phone]

## 2017-04-27 NOTE — MR AVS SNAPSHOT
Annabel Bethea   2017 9:30 AM   Routine Prenatal   MRN: [id number]    [facility]   Dept Phone:  [phone]    Description:  Female : 1980   Provider:  Jen Grant M.D.           Allergies as of 2017     Allergen Noted Reactions    Peanut-Derived 2016         Vital Signs     Blood Pressure Weight Last Menstrual Period Smoking Status          110/70 mmHg 107.956 kg (238 lb) 08/15/2016 Former Smoker        Basic Information     Date Of Birth Sex Race Ethnicity Preferred Language    1980 Female Black or  Non- English      Your appointments     May 15, 2017  8:00 AM   [facility] INDUCTION OF LABOR with NON-SURGICAL L&D   LABOR & DELIVERY [facility] (--)    [address] [place] [address] [phone]              Problem List              ICD-10-CM Priority Class Noted - Resolved    Supervision of high risk pregnancy, antepartum O09.90   2016 - Present    History of gestational diabetes in prior pregnancy, currently pregnant O09.299, Z86.32   2016 - Present    Chronic low back pain with sciatica M54.40, G89.29   2017 - Present      Health Maintenance        Date Due Completion Dates    PAP SMEAR 2019    IMM DTaP/Tdap/Td Vaccine (2 - Td) 3/22/2027 3/22/2017            Current Immunizations     Tdap Vaccine 3/22/2017  3:29 PM      Below and/or attached are the medications your provider expects you to take. Review all of your home medications and newly ordered medications with your provider and/or pharmacist. Follow medication instructions as directed by your provider and/or pharmacist. Please keep your medication list with you and share with your provider. Update the information when medications are discontinued, doses are changed, or new medications (including over-the-counter products) are added; and carry medication information at all times in the event of emergency situations     Allergies:   PEANUT-DERIVED - (reactions not documented)               Medications  Valid as of: April 27, 2017 -  9:47 AM    Generic Name Brand Name Tablet Size Instructions for use    Acetaminophen   Take  by mouth.        Acetaminophen-Codeine (Tab) Acetaminophen-Codeine 300-30 MG Take 1-2 Tabs by mouth every four hours as needed.        Acetaminophen-Codeine (Tab) Acetaminophen-Codeine 300-30 MG Take 1-2 Tabs by mouth every four hours as needed.        Bisacodyl (Tablet Delayed Response) DULCOLAX 5 MG Take 2 Tabs by mouth as needed.        Doxylamine-Pyridoxine (Tablet Delayed Response) Doxylamine-Pyridoxine 10-10 MG Take 1 Tab by mouth 4 times a day as needed (nausea).        Gabapentin (Cap) NEURONTIN 300 MG Take 2 Caps by mouth 3 times a day.        Gabapentin (Cap) NEURONTIN 300 MG Take 1 Cap by mouth 3 times a day.        Glucose Blood (Strip) glucose blood  Patient to check blood sugar 4 times a day.        Lancets (Misc) ONETOUCH DELICA LANCETS FINE  1 Stick by Does not apply route 4 times a day.        Ondansetron HCl (Tab) ZOFRAN 4 MG Take 1 Tab by mouth every four hours as needed.        Oxycodone-Acetaminophen (Tab) PERCOCET 5-325 MG Take 1-2 Tabs by mouth every 6 hours as needed for Moderate Pain (pain).        .                 Medicines prescribed today were sent to:     [facility] [address] [place] [address] & [address] [address]    Phone: [phone] Fax: [phone]    Open 24 Hours?: No      Medication refill instructions:       If your prescription bottle indicates you have medication refills left, it is not necessary to call your provider’s office. Please contact your pharmacy and they will refill your medication.    If your prescription bottle indicates you do not have any refills left, you may request refills at any time through one of the following ways: The online Atmospheir system (except Urgent Care), by calling your provider’s office,  or by asking your pharmacy to contact your provider’s office with a refill request. Medication refills are processed only during regular business hours and may not be available until the next business day. Your provider may request additional information or to have a follow-up visit with you prior to refilling your medication.   *Please Note: Medication refills are assigned a new Rx number when refilled electronically. Your pharmacy may indicate that no refills were authorized even though a new prescription for the same medication is available at the pharmacy. Please request the medicine by name with the pharmacy before contacting your provider for a refill.           MyChart Status: Patient Declined

## 2017-04-27 NOTE — PROGRESS NOTES
Annabel Bethea 36 y.o.  36w3d here today for obstetrical visit. Patient reports good  fetal movement. reports contractions, denies vaginal bleeding, denies loss of fluid.    Pregnancy is complicated by   Patient Active Problem List    Diagnosis Date Noted   • Chronic low back pain with sciatica 2017   • Supervision of high risk pregnancy, antepartum 2016   • History of gestational diabetes in prior pregnancy, currently pregnant 2016       GBS  neg  Fasting blood sugars range did not bring blood sugars  Postprandial blood sugars range     Discussed compliance at length with patient. Discussed with patient increased risk of fetal stillbirth with uncontrolled gestational diabetes. Patient voiced understanding and states she will bring her blood sugars in the visit  Sciatic nerve discomfort is improved  Patient has induction of labor scheduled for May 15 secondary to sciatic nerve discomforts    Insulin regimen  NPH a.m. 0 , regular a.m.0  Regular with dinner 0  NPH at bedtime 0     Followup in 1 weeks   labor precautions are reviewed  Continue kick counts daily after 28 weeks  NST twice weekly after 32 weeks if on insulin

## 2017-05-04 ENCOUNTER — ROUTINE PRENATAL (OUTPATIENT)
Dept: OBGYN | Facility: CLINIC | Age: 37
End: 2017-05-04
Payer: MEDICAID

## 2017-05-04 VITALS — WEIGHT: 235 LBS | SYSTOLIC BLOOD PRESSURE: 116 MMHG | BODY MASS INDEX: 36.8 KG/M2 | DIASTOLIC BLOOD PRESSURE: 68 MMHG

## 2017-05-04 DIAGNOSIS — O36.8130 DECREASED FETAL MOVEMENT, THIRD TRIMESTER, NOT APPLICABLE OR UNSPECIFIED FETUS: ICD-10-CM

## 2017-05-04 DIAGNOSIS — O24.414 INSULIN CONTROLLED GESTATIONAL DIABETES MELLITUS (GDM) IN THIRD TRIMESTER: ICD-10-CM

## 2017-05-04 DIAGNOSIS — O24.419 GDM, CLASS A2: ICD-10-CM

## 2017-05-04 DIAGNOSIS — Z91.199 NON-COMPLIANT PATIENT: ICD-10-CM

## 2017-05-04 LAB
APPEARANCE UR: NORMAL
BILIRUB UR STRIP-MCNC: NORMAL MG/DL
COLOR UR AUTO: NORMAL
GLUCOSE UR STRIP.AUTO-MCNC: NEGATIVE MG/DL
KETONES UR STRIP.AUTO-MCNC: NEGATIVE MG/DL
LEUKOCYTE ESTERASE UR QL STRIP.AUTO: NEGATIVE
NITRITE UR QL STRIP.AUTO: NEGATIVE
NST ACOUSTIC STIMULATION: NORMAL
NST ACTION NECESSARY: NORMAL
NST ASSESSMENT: REACTIVE
NST BASELINE: 150
NST INDICATIONS: NORMAL
NST OTHER DATA: NORMAL
NST READ BY: NORMAL
NST RETURN: NORMAL
NST UTERINE ACTIVITY: NORMAL
PH UR STRIP.AUTO: 6 [PH] (ref 5–8)
PROT UR QL STRIP: NORMAL MG/DL
RBC UR QL AUTO: NORMAL
SP GR UR STRIP.AUTO: 1.02
UROBILINOGEN UR STRIP-MCNC: NORMAL MG/DL

## 2017-05-04 PROCEDURE — 90040 PR PRENATAL FOLLOW UP: CPT | Performed by: OBSTETRICS & GYNECOLOGY

## 2017-05-04 PROCEDURE — 81002 URINALYSIS NONAUTO W/O SCOPE: CPT | Performed by: OBSTETRICS & GYNECOLOGY

## 2017-05-04 PROCEDURE — 59025 FETAL NON-STRESS TEST: CPT | Performed by: OBSTETRICS & GYNECOLOGY

## 2017-05-04 NOTE — PROGRESS NOTES
S: Doing well. Decreased fetal movement.  negative vaginal bleeding.  negative leakage of fluid.  negative contractions.  negative headache. negative nausea/vomiting.  negative RUQ pain.  negative vision changes.  Reviewed blood sugar log with patient.  Reviewed diet.  Questions answered.    O: VSS Afeb    FBS range:elevated  Fundal Height:    1 hour post prandial range:wnl    A/P:  36 y.o.  37w3d by last menstrual period which is consistent with 19 week ultrasound with A2GDM who presents for obstetric follow-up.    1.  Labs: HgbA1C= not done yet  2.  Start insulin dosage as follows:       QHS: 10 NPH       Insulin teaching with Leah BEST  3.  NSTs: 2x/week    4. Continue prenatal vitamins.  5.  Fetal kick counts    6.  Watch weight gain.  7.  Increase water intake.  8.  Encouraged prenatal classes.  9.  IOL at 39 weeks  10.OB ff-up 1 week

## 2017-05-04 NOTE — MR AVS SNAPSHOT
Annabel Archibald    2017 10:15 AM   Routine Prenatal   MRN: [id number]    [facility]   Dept Phone:  [phone]    Description:  Female : 1980   Provider:  Suze Ibrahim M.D.           Allergies as of 2017     Allergen Noted Reactions    Peanut-Derived 2016         You were diagnosed with     GDM, class A2   [284260]       Insulin controlled gestational diabetes mellitus (GDM) in third trimester   [2236281]       Non-compliant patient   [971075]         Vital Signs     Blood Pressure Weight Last Menstrual Period Smoking Status          116/68 mmHg 106.595 kg (235 lb) 08/15/2016 Former Smoker        Basic Information     Date Of Birth Sex Race Ethnicity Preferred Language    1980 Female Black or  Non- English      Your appointments     May 15, 2017  8:00 AM   [facility] INDUCTION OF LABOR with NON-SURGICAL L&D   [facility] [place])    [address] [place] [address] [phone]              Problem List              ICD-10-CM Priority Class Noted - Resolved    Supervision of high risk pregnancy, antepartum O09.90   2016 - Present    History of gestational diabetes in prior pregnancy, currently pregnant O09.299, Z86.32   2016 - Present    Chronic low back pain with sciatica M54.40, G89.29   2017 - Present    Insulin controlled gestational diabetes mellitus (GDM) in third trimester O24.414   2017 - Present    Non-compliant patient Z91.19   2017 - Present      Health Maintenance        Date Due Completion Dates    PAP SMEAR 2019    IMM DTaP/Tdap/Td Vaccine (2 - Td) 3/22/2027 3/22/2017            Results     POCT Urinalysis      Component Value Standard Range & Units    POC Color  Negative    POC Appearance  Negative    POC Leukocyte Esterase negative Negative    POC Nitrites negative Negative    POC Urobiligen  Negative (0.2) mg/dL    POC Protein trace Negative mg/dL    POC Urine  PH 6.0 5.0 - 8.0    POC Blood trace Negative    POC Specific Gravity 1.020 <1.005 - >1.030    POC Ketones negative Negative mg/dL    POC Biliruben  Negative mg/dL    POC Glucose negative Negative mg/dL                        Current Immunizations     Tdap Vaccine 3/22/2017  3:29 PM      Below and/or attached are the medications your provider expects you to take. Review all of your home medications and newly ordered medications with your provider and/or pharmacist. Follow medication instructions as directed by your provider and/or pharmacist. Please keep your medication list with you and share with your provider. Update the information when medications are discontinued, doses are changed, or new medications (including over-the-counter products) are added; and carry medication information at all times in the event of emergency situations     Allergies:  PEANUT-DERIVED - (reactions not documented)               Medications  Valid as of: May 04, 2017 - 12:34 PM    Generic Name Brand Name Tablet Size Instructions for use    Acetaminophen   Take  by mouth.        Acetaminophen-Codeine (Tab) Acetaminophen-Codeine 300-30 MG Take 1-2 Tabs by mouth every four hours as needed.        Acetaminophen-Codeine (Tab) Acetaminophen-Codeine 300-30 MG Take 1-2 Tabs by mouth every four hours as needed.        Bisacodyl (Tablet Delayed Response) DULCOLAX 5 MG Take 2 Tabs by mouth as needed.        Doxylamine-Pyridoxine (Tablet Delayed Response) Doxylamine-Pyridoxine 10-10 MG Take 1 Tab by mouth 4 times a day as needed (nausea).        Gabapentin (Cap) NEURONTIN 300 MG Take 2 Caps by mouth 3 times a day.        Gabapentin (Cap) NEURONTIN 300 MG Take 1 Cap by mouth 3 times a day.        Glucose Blood (Strip) glucose blood  Patient to check blood sugar 4 times a day.        Insulin NPH Human (Isophane) (Suspension) HUMULIN,NOVOLIN 100 UNIT/ML Patient to inject 10 units of NPH insulin at bed time.        Lancets (Misc) ONETOUCH DELICA  LANCETS FINE  1 Stick by Does not apply route 4 times a day.        Ondansetron HCl (Tab) ZOFRAN 4 MG Take 1 Tab by mouth every four hours as needed.        Oxycodone-Acetaminophen (Tab) PERCOCET 5-325 MG Take 1-2 Tabs by mouth every 6 hours as needed for Moderate Pain (pain).        .                 Medicines prescribed today were sent to:     [facility] [address] [place] [address], [place] - [address] [facility] AT [facility] & [address] [place] [address]    Phone: [phone] Fax: [phone]    Open 24 Hours?: No      Medication refill instructions:       If your prescription bottle indicates you have medication refills left, it is not necessary to call your provider’s office. Please contact your pharmacy and they will refill your medication.    If your prescription bottle indicates you do not have any refills left, you may request refills at any time through one of the following ways: The online 24Symbols system (except Urgent Care), by calling your provider’s office, or by asking your pharmacy to contact your provider’s office with a refill request. Medication refills are processed only during regular business hours and may not be available until the next business day. Your provider may request additional information or to have a follow-up visit with you prior to refilling your medication.   *Please Note: Medication refills are assigned a new Rx number when refilled electronically. Your pharmacy may indicate that no refills were authorized even though a new prescription for the same medication is available at the pharmacy. Please request the medicine by name with the pharmacy before contacting your provider for a refill.           MyChart Status: Patient Declined

## 2017-05-04 NOTE — PROGRESS NOTES
Insulin instructions given to pt on 5/4/17. Pt will start on 10 units of NPH, QHS. Pt instructed on how to draw up insulin, site selection and rotation, self injection technique, proper storage of disposal of syringes. Pt demonstrated back self injection technique successfully. Advised to follow really close her meal plan. Instruction booklet given. Will call back in case of any questions.

## 2017-05-04 NOTE — PROGRESS NOTES
OB f/u - A2 GDM  + fetal movement.  No VB, LOF or UC's.  Good phone # [phone]  Preferred pharmacy confirmed.  HgbA1c not done yet, pt states she will do it after this appt

## 2017-05-04 NOTE — MR AVS SNAPSHOT
Annabel Archibald    2017 10:30 AM   Routine Prenatal   MRN: [id number]    [facility]   Dept Phone:  [phone]    Description:  Female : 1980   Provider:  Suze Ibrahim M.D.           Allergies as of 2017     Allergen Noted Reactions    Peanut-Derived 2016         You were diagnosed with     Decreased fetal movement, third trimester, not applicable or unspecified fetus   [[id number]]         Vital Signs     Last Menstrual Period Smoking Status                08/15/2016 Former Smoker          Basic Information     Date Of Birth Sex Race Ethnicity Preferred Language    1980 Female Black or  Non- English      Your appointments     May 15, 2017  8:00 AM   [facility] INDUCTION OF LABOR with NON-SURGICAL L&D   [facility] [place] (--)    [address] [place] [address] [id number] [phone]              Problem List              ICD-10-CM Priority Class Noted - Resolved    Supervision of high risk pregnancy, antepartum O09.90   2016 - Present    History of gestational diabetes in prior pregnancy, currently pregnant O09.299, Z86.32   2016 - Present    Chronic low back pain with sciatica M54.40, G89.29   2017 - Present    Insulin controlled gestational diabetes mellitus (GDM) in third trimester O24.414   2017 - Present    Non-compliant patient Z91.19   2017 - Present      Health Maintenance        Date Due Completion Dates    PAP SMEAR 2019    IMM DTaP/Tdap/Td Vaccine (2 - Td) 3/22/2027 3/22/2017            Results     POCT NST      Component    NST Indications    decreased FM    NST Baseline    150    NST Uterine Activity    none    NST Acoustic Stimulation    none    NST Assessment    reactive    NST Action Necessary    NST Other Data    NST Return    2 x a week, IOL    NST Read By    SM                        Current Immunizations     Tdap Vaccine 3/22/2017  3:29 PM      Below and/or attached are the  medications your provider expects you to take. Review all of your home medications and newly ordered medications with your provider and/or pharmacist. Follow medication instructions as directed by your provider and/or pharmacist. Please keep your medication list with you and share with your provider. Update the information when medications are discontinued, doses are changed, or new medications (including over-the-counter products) are added; and carry medication information at all times in the event of emergency situations     Allergies:  PEANUT-DERIVED - (reactions not documented)               Medications  Valid as of: May 04, 2017 - 12:34 PM    Generic Name Brand Name Tablet Size Instructions for use    Acetaminophen   Take  by mouth.        Acetaminophen-Codeine (Tab) Acetaminophen-Codeine 300-30 MG Take 1-2 Tabs by mouth every four hours as needed.        Acetaminophen-Codeine (Tab) Acetaminophen-Codeine 300-30 MG Take 1-2 Tabs by mouth every four hours as needed.        Bisacodyl (Tablet Delayed Response) DULCOLAX 5 MG Take 2 Tabs by mouth as needed.        Doxylamine-Pyridoxine (Tablet Delayed Response) Doxylamine-Pyridoxine 10-10 MG Take 1 Tab by mouth 4 times a day as needed (nausea).        Gabapentin (Cap) NEURONTIN 300 MG Take 2 Caps by mouth 3 times a day.        Gabapentin (Cap) NEURONTIN 300 MG Take 1 Cap by mouth 3 times a day.        Glucose Blood (Strip) glucose blood  Patient to check blood sugar 4 times a day.        Insulin NPH Human (Isophane) (Suspension) HUMULIN,NOVOLIN 100 UNIT/ML Patient to inject 10 units of NPH insulin at bed time.        Lancets (Misc) ONETOUCH DELICA LANCETS FINE  1 Stick by Does not apply route 4 times a day.        Ondansetron HCl (Tab) ZOFRAN 4 MG Take 1 Tab by mouth every four hours as needed.        Oxycodone-Acetaminophen (Tab) PERCOCET 5-325 MG Take 1-2 Tabs by mouth every 6 hours as needed for Moderate Pain (pain).        .                 Medicines prescribed  today were sent to:     [id number] - [place], [place] - [address] [facility] AT [facility] & [facility]    [address] [facility] [place] [address]    Phone: [phone] Fax: [phone]    Open 24 Hours?: No      Medication refill instructions:       If your prescription bottle indicates you have medication refills left, it is not necessary to call your provider’s office. Please contact your pharmacy and they will refill your medication.    If your prescription bottle indicates you do not have any refills left, you may request refills at any time through one of the following ways: The online [employer] system (except Urgent Care), by calling your provider’s office, or by asking your pharmacy to contact your provider’s office with a refill request. Medication refills are processed only during regular business hours and may not be available until the next business day. Your provider may request additional information or to have a follow-up visit with you prior to refilling your medication.   *Please Note: Medication refills are assigned a new Rx number when refilled electronically. Your pharmacy may indicate that no refills were authorized even though a new prescription for the same medication is available at the pharmacy. Please request the medicine by name with the pharmacy before contacting your provider for a refill.           MyChart Status: Patient Declined

## 2017-05-04 NOTE — PROGRESS NOTES
I checked Annabel's meter memory against the written record today and she is recording blood sugars correctly.  She is not testing 4 times a day every day.  She will try to do better.

## 2017-05-05 DIAGNOSIS — M54.40 CHRONIC LOW BACK PAIN WITH SCIATICA, SCIATICA LATERALITY UNSPECIFIED, UNSPECIFIED BACK PAIN LATERALITY: ICD-10-CM

## 2017-05-05 DIAGNOSIS — G89.29 CHRONIC LOW BACK PAIN WITH SCIATICA, SCIATICA LATERALITY UNSPECIFIED, UNSPECIFIED BACK PAIN LATERALITY: ICD-10-CM

## 2017-05-05 DIAGNOSIS — O09.93 SUPERVISION OF HIGH RISK PREGNANCY, ANTEPARTUM, THIRD TRIMESTER: ICD-10-CM

## 2017-05-05 RX ORDER — GABAPENTIN 600 MG/1
600 TABLET ORAL 3 TIMES DAILY
Qty: 90 TAB | Refills: 3 | Status: ON HOLD | OUTPATIENT
Start: 2017-05-05 | End: 2017-05-15

## 2017-05-05 NOTE — TELEPHONE ENCOUNTER
Pt called stating she is was frustrated because every time she come to our office and she has to see different Doctors every time and she was asking to get a refill on Gabapentin and was told she was not due for refill yet since pt signed a controlled substance Tx consent but she agreed with Dr. Grant to take 600mg 3 times a day. Pt states when she ask for refill MD tells her she is not due for Rx without looking in her chart to see what her POC is that was set with Dr. Grant.   After reviewing pt's chart found on 3/29/17 Dr. Grant's note agreeing for pt to increase Gabapentin from 300mg TID to 600mg TID and wrote a Rx for the 600mgs but #90 with 3 refills so pt had to refill it Q2wks. Consulted with Dr. Hahn and agreed to prescribe Gabapentin 600mg TID #180 [id number].   Also pt states she try to  Rx for syringes but it was not ready. Explained to pt I just called her pharmacy and her syringes are ready. Verbalized understanding.     1031 called pt back and informed Rx will send to her pharmacy. Advised for pt call us ASAP if she has any problems with her prescription.

## 2017-05-08 ENCOUNTER — ROUTINE PRENATAL (OUTPATIENT)
Dept: OBGYN | Facility: CLINIC | Age: 37
End: 2017-05-08
Payer: MEDICAID

## 2017-05-08 DIAGNOSIS — O24.419 GDM, CLASS A2: ICD-10-CM

## 2017-05-08 LAB
NST ACOUSTIC STIMULATION: NO
NST ACTION NECESSARY: NORMAL
NST ASSESSMENT: NORMAL
NST BASELINE: 130
NST INDICATIONS: NORMAL
NST OTHER DATA: NORMAL
NST READ BY: NORMAL
NST RETURN: NORMAL
NST UTERINE ACTIVITY: NORMAL

## 2017-05-08 PROCEDURE — 59025 FETAL NON-STRESS TEST: CPT | Performed by: OBSTETRICS & GYNECOLOGY

## 2017-05-08 NOTE — MR AVS SNAPSHOT
Annabel Archibald    2017 11:00 AM   Routine Prenatal   MRN: [id number]    Department[facility]  [facility]   Dept Phone:  [phone]    Description:  Female : 1980   Provider:  Tamika Hahn M.D.           Allergies as of 2017     Allergen Noted Reactions    Peanut-Derived 2016         You were diagnosed with     GDM, class A2   [[id number]]         Vital Signs     Last Menstrual Period Smoking Status                08/15/2016 Former Smoker          Basic Information     Date Of Birth Sex Race Ethnicity Preferred Language    1980 Female Black or  Non- English      Your appointments     May 11, 2017  8:30 AM   Fetal Non-Stress Test with PC NST   [facility])    [address] [place] [address] [address]  [facility] [id number] [phone]            May 15, 2017  8:00 AM   [facility] INDUCTION OF LABOR with NON-SURGICAL L&D   LABOR & DELIVERY [place] (--)    [address] [place], [place] [address] [place] [address]   [phone]            May 18, 2017  9:45 AM   Diabetic with PC MD   [facility])    [address] [place] [address] [address]  [facility] [address]   [phone]              Problem List              ICD-10-CM Priority Class Noted - Resolved    Supervision of high risk pregnancy, antepartum O09.90   2016 - Present    History of gestational diabetes in prior pregnancy, currently pregnant O09.299, Z86.32   2016 - Present    Chronic low back pain with sciatica M54.40, G89.29   2017 - Present    Insulin controlled gestational diabetes mellitus (GDM) in third trimester O24.414   2017 - Present    Non-compliant patient Z91.19   2017 - Present      Health Maintenance        Date Due Completion Dates    PAP SMEAR 2019    IMM DTaP/Tdap/Td Vaccine (2 - Td) 3/22/2027 3/22/2017            Results     POCT Fetal Nonstress Test      Component    NST Indications    GDM    NST Baseline    130    NST Uterine Activity    none    NST Acoustic Stimulation    no    NST  Assessment    reactive, cat 1    NST Action Necessary    NST Other Data    NST Return    NST Read By                        Current Immunizations     Tdap Vaccine 3/22/2017  3:29 PM      Below and/or attached are the medications your provider expects you to take. Review all of your home medications and newly ordered medications with your provider and/or pharmacist. Follow medication instructions as directed by your provider and/or pharmacist. Please keep your medication list with you and share with your provider. Update the information when medications are discontinued, doses are changed, or new medications (including over-the-counter products) are added; and carry medication information at all times in the event of emergency situations     Allergies:  PEANUT-DERIVED - (reactions not documented)               Medications  Valid as of: May 08, 2017 - 11:26 AM    Generic Name Brand Name Tablet Size Instructions for use    Acetaminophen   Take  by mouth.        Acetaminophen-Codeine (Tab) Acetaminophen-Codeine 300-30 MG Take 1-2 Tabs by mouth every four hours as needed.        Acetaminophen-Codeine (Tab) Acetaminophen-Codeine 300-30 MG Take 1-2 Tabs by mouth every four hours as needed.        Bisacodyl (Tablet Delayed Response) DULCOLAX 5 MG Take 2 Tabs by mouth as needed.        Doxylamine-Pyridoxine (Tablet Delayed Response) Doxylamine-Pyridoxine 10-10 MG Take 1 Tab by mouth 4 times a day as needed (nausea).        Gabapentin (Cap) NEURONTIN 300 MG Take 2 Caps by mouth 3 times a day.        Gabapentin (Cap) NEURONTIN 300 MG Take 1 Cap by mouth 3 times a day.        Gabapentin (Tab) NEURONTIN 600 MG Take 1 Tab by mouth 3 times a day.        Glucose Blood (Strip) glucose blood  Patient to check blood sugar 4 times a day.        Insulin NPH Human (Isophane) (Suspension) HUMULIN,NOVOLIN 100 UNIT/ML Patient to inject 10 units of NPH insulin at bed time.        Insulin Syringe-Needle U-100 (Misc) Insulin Syringe-Needle  "U-100 26G X 1/2\" 1 ML 1 Units by Does not apply route 3 times a day.        Insulin Syringes (Disposable) (Misc) Insulin Syringes (Disposable) U-100 0.5 ML Use 3 times daily.        Lancets (Misc) ONETOUCH DELICA LANCETS FINE  1 Stick by Does not apply route 4 times a day.        Ondansetron HCl (Tab) ZOFRAN 4 MG Take 1 Tab by mouth every four hours as needed.        Oxycodone-Acetaminophen (Tab) PERCOCET 5-325 MG Take 1-2 Tabs by mouth every 6 hours as needed for Moderate Pain (pain).        .                 Medicines prescribed today were sent to:     [facility] [employer] [facility] [address] [place], [place] [address] [address] [facility] AT [facility] & [address] [address]    Phone: [phone] Fax: [phone]    Open 24 Hours?: No      Medication refill instructions:       If your prescription bottle indicates you have medication refills left, it is not necessary to call your provider’s office. Please contact your pharmacy and they will refill your medication.    If your prescription bottle indicates you do not have any refills left, you may request refills at any time through one of the following ways: The online [employer] system (except Urgent Care), by calling your provider’s office, or by asking your pharmacy to contact your provider’s office with a refill request. Medication refills are processed only during regular business hours and may not be available until the next business day. Your provider may request additional information or to have a follow-up visit with you prior to refilling your medication.   *Please Note: Medication refills are assigned a new Rx number when refilled electronically. Your pharmacy may indicate that no refills were authorized even though a new prescription for the same medication is available at the pharmacy. Please request the medicine by name with the pharmacy before contacting your provider for a refill.           MyChart Status: Patient Declined        "

## 2017-05-11 ENCOUNTER — HOSPITAL ENCOUNTER (OUTPATIENT)
Facility: MEDICAL CENTER | Age: 37
End: 2017-05-11
Attending: OBSTETRICS & GYNECOLOGY | Admitting: OBSTETRICS & GYNECOLOGY
Payer: MEDICAID

## 2017-05-11 VITALS
DIASTOLIC BLOOD PRESSURE: 55 MMHG | BODY MASS INDEX: 36.88 KG/M2 | WEIGHT: 235 LBS | RESPIRATION RATE: 18 BRPM | HEIGHT: 67 IN | SYSTOLIC BLOOD PRESSURE: 92 MMHG | HEART RATE: 113 BPM | TEMPERATURE: 97.5 F

## 2017-05-11 PROCEDURE — 59025 FETAL NON-STRESS TEST: CPT | Performed by: OBSTETRICS & GYNECOLOGY

## 2017-05-11 NOTE — PROGRESS NOTES
"[facility] LABOR AND DELIVERY TRIAGE PROGRESS NOTE    PATIENT ID:  NAME:  Annabel Bethea  MRN:               [id number]  YOB: 1980     36 y.o. female  at 38w3d.    Subjective: Pt presents for concern over decreased FM.  Starting since this am noticed less FM than usual, did kick counts and counted 5 counts in 1 hour.    Pregnancy complicated by Gestational diabetes, started in NPH two weeks ago.      negative  For CTXS.   negative Feels pain   negative for LOF  negative for vaginal bleeding.   positive for fetal movement    ROS: Patient denies any fever chills, nausea, vomiting, headache, chest pain, shortness of breath, or dysuria or unusual swelling of hands or feet.     Objective:    Filed Vitals:    17 1010 17 1020   BP:  92/55   Pulse:  113   Temp:  36.4 °C (97.5 °F)   Resp:  18   Height: 1.702 m (5' 7\")    Weight: 106.595 kg (235 lb)      Temp (24hrs), Av.4 °C (97.5 °F), Min:36.4 °C (97.5 °F), Max:36.4 °C (97.5 °F)    General: No acute distress, resting comfortably in bed.  HEENT: normocephalic, nontraumatic, PERRLA, EOMI  Cardiovascular: Heart RRR with no murmurs, rubs or gallops. Distal Pulses 2+  Respiratory: symmetric chest expansion, lungs CTAB, with no wheezes, rales, rhonci  Abdomen: gravid, nontender  Musculoskeletal: strength 5/5 in four extremities  Neuro: non focal with no numbness, tingling or changes in sensation    Cervix:  Not evaluated  [employer]: Uterine Contractions none   FHRM: Baseline 140s, Accels to 160s , no decels, moderate variability    Assessment: 36 y.o. female  at 38w3d.    Plan:   1. Discharge home with return precautions and instructions to go to [facility] to scheduled IOL   2. Continue GURWINDER as instructed       Discussed case with Dr. Hahn, TPN Attending. Case was discussed and attending agreed with plan prior to discharge of patient.    "

## 2017-05-11 NOTE — PROGRESS NOTES
"1015 Patient is a 36 year old  at 38 3/7 weeks gestation. Patient presents to D complaining of decreased fetal movement since 0500. Patient states she laid on her left side, had water and food, but baby only moved x2 times. Patient denies LOF, vaginal bleeding, uc's/cramping. External monitors x2 applied/vitals taken, will monitor. 1045 Resident at bedside, patient states she has felt baby moved x 5 times since arrival to [facility]. 1110 Patient states is \"feeling a lot of fetal movement and is now relieved\". 1130 Dr. Hahn reviewed strip, order received to discharge patient to home with labor precautions, patient will follow up with [facility] this afternoon to discuss IOL. 1135 Labor discharge instructions given, patient verbalized understanding. Patient discharged to home, ambulatory in stable condition.   "

## 2017-05-15 ENCOUNTER — HOSPITAL ENCOUNTER (INPATIENT)
Facility: MEDICAL CENTER | Age: 37
LOS: 2 days | End: 2017-05-17
Attending: OBSTETRICS & GYNECOLOGY | Admitting: OBSTETRICS & GYNECOLOGY
Payer: MEDICAID

## 2017-05-15 LAB
BASOPHILS # BLD AUTO: 0.5 % (ref 0–1.8)
BASOPHILS # BLD: 0.04 K/UL (ref 0–0.12)
EOSINOPHIL # BLD AUTO: 0.1 K/UL (ref 0–0.51)
EOSINOPHIL NFR BLD: 1.2 % (ref 0–6.9)
ERYTHROCYTE [DISTWIDTH] IN BLOOD BY AUTOMATED COUNT: 46.6 FL (ref 35.9–50)
GLUCOSE BLD-MCNC: 106 MG/DL (ref 65–99)
GLUCOSE BLD-MCNC: 155 MG/DL (ref 65–99)
HCT VFR BLD AUTO: 39.7 % (ref 37–47)
HGB BLD-MCNC: 13.6 G/DL (ref 12–16)
HOLDING TUBE BB 8507: NORMAL
IMM GRANULOCYTES # BLD AUTO: 0.03 K/UL (ref 0–0.11)
IMM GRANULOCYTES NFR BLD AUTO: 0.3 % (ref 0–0.9)
LYMPHOCYTES # BLD AUTO: 2.64 K/UL (ref 1–4.8)
LYMPHOCYTES NFR BLD: 30.6 % (ref 22–41)
MCH RBC QN AUTO: 31.4 PG (ref 27–33)
MCHC RBC AUTO-ENTMCNC: 34.3 G/DL (ref 33.6–35)
MCV RBC AUTO: 91.7 FL (ref 81.4–97.8)
MONOCYTES # BLD AUTO: 0.45 K/UL (ref 0–0.85)
MONOCYTES NFR BLD AUTO: 5.2 % (ref 0–13.4)
NEUTROPHILS # BLD AUTO: 5.38 K/UL (ref 2–7.15)
NEUTROPHILS NFR BLD: 62.2 % (ref 44–72)
NRBC # BLD AUTO: 0 K/UL
NRBC BLD AUTO-RTO: 0 /100 WBC
PLATELET # BLD AUTO: 218 K/UL (ref 164–446)
PMV BLD AUTO: 12.9 FL (ref 9–12.9)
RBC # BLD AUTO: 4.33 M/UL (ref 4.2–5.4)
WBC # BLD AUTO: 8.6 K/UL (ref 4.8–10.8)

## 2017-05-15 PROCEDURE — 3E033VJ INTRODUCTION OF OTHER HORMONE INTO PERIPHERAL VEIN, PERCUTANEOUS APPROACH: ICD-10-PCS | Performed by: OBSTETRICS & GYNECOLOGY

## 2017-05-15 PROCEDURE — A9270 NON-COVERED ITEM OR SERVICE: HCPCS | Performed by: NURSE PRACTITIONER

## 2017-05-15 PROCEDURE — 700105 HCHG RX REV CODE 258: Performed by: NURSE PRACTITIONER

## 2017-05-15 PROCEDURE — 303615 HCHG EPIDURAL/SPINAL ANESTHESIA FOR LABOR

## 2017-05-15 PROCEDURE — 3E0P7GC INTRODUCTION OF OTHER THERAPEUTIC SUBSTANCE INTO FEMALE REPRODUCTIVE, VIA NATURAL OR ARTIFICIAL OPENING: ICD-10-PCS | Performed by: OBSTETRICS & GYNECOLOGY

## 2017-05-15 PROCEDURE — 82962 GLUCOSE BLOOD TEST: CPT

## 2017-05-15 PROCEDURE — 85025 COMPLETE CBC W/AUTO DIFF WBC: CPT

## 2017-05-15 PROCEDURE — 700101 HCHG RX REV CODE 250: Performed by: NURSE PRACTITIONER

## 2017-05-15 PROCEDURE — 700101 HCHG RX REV CODE 250

## 2017-05-15 PROCEDURE — 304965 HCHG RECOVERY SERVICES

## 2017-05-15 PROCEDURE — 770002 HCHG ROOM/CARE - OB PRIVATE (112)

## 2017-05-15 PROCEDURE — 3E0S3NZ INTRODUCTION OF ANALGESICS, HYPNOTICS, SEDATIVES INTO EPIDURAL SPACE, PERCUTANEOUS APPROACH: ICD-10-PCS | Performed by: ANESTHESIOLOGY

## 2017-05-15 PROCEDURE — 36415 COLL VENOUS BLD VENIPUNCTURE: CPT

## 2017-05-15 PROCEDURE — 700111 HCHG RX REV CODE 636 W/ 250 OVERRIDE (IP): Performed by: NURSE PRACTITIONER

## 2017-05-15 PROCEDURE — 700111 HCHG RX REV CODE 636 W/ 250 OVERRIDE (IP)

## 2017-05-15 PROCEDURE — 59409 OBSTETRICAL CARE: CPT

## 2017-05-15 PROCEDURE — 700102 HCHG RX REV CODE 250 W/ 637 OVERRIDE(OP): Performed by: NURSE PRACTITIONER

## 2017-05-15 RX ORDER — MISOPROSTOL 200 UG/1
TABLET ORAL
Status: COMPLETED
Start: 2017-05-15 | End: 2017-05-15

## 2017-05-15 RX ORDER — ONDANSETRON 2 MG/ML
4 INJECTION INTRAMUSCULAR; INTRAVENOUS EVERY 6 HOURS PRN
Status: DISCONTINUED | OUTPATIENT
Start: 2017-05-15 | End: 2017-05-17 | Stop reason: HOSPADM

## 2017-05-15 RX ORDER — METHYLERGONOVINE MALEATE 0.2 MG/ML
INJECTION INTRAVENOUS
Status: COMPLETED
Start: 2017-05-15 | End: 2017-05-15

## 2017-05-15 RX ORDER — METHYLERGONOVINE MALEATE 0.2 MG/ML
0.2 INJECTION INTRAVENOUS
Status: DISCONTINUED | OUTPATIENT
Start: 2017-05-15 | End: 2017-05-17 | Stop reason: HOSPADM

## 2017-05-15 RX ORDER — MISOPROSTOL 200 UG/1
800 TABLET ORAL
Status: DISCONTINUED | OUTPATIENT
Start: 2017-05-15 | End: 2017-05-17 | Stop reason: HOSPADM

## 2017-05-15 RX ORDER — MISOPROSTOL 200 UG/1
800 TABLET ORAL
Status: DISCONTINUED | OUTPATIENT
Start: 2017-05-15 | End: 2017-05-15 | Stop reason: HOSPADM

## 2017-05-15 RX ORDER — ALUMINA, MAGNESIA, AND SIMETHICONE 2400; 2400; 240 MG/30ML; MG/30ML; MG/30ML
30 SUSPENSION ORAL EVERY 6 HOURS PRN
Status: DISCONTINUED | OUTPATIENT
Start: 2017-05-15 | End: 2017-05-15 | Stop reason: HOSPADM

## 2017-05-15 RX ORDER — SODIUM CHLORIDE, SODIUM LACTATE, POTASSIUM CHLORIDE, CALCIUM CHLORIDE 600; 310; 30; 20 MG/100ML; MG/100ML; MG/100ML; MG/100ML
INJECTION, SOLUTION INTRAVENOUS
Status: ACTIVE
Start: 2017-05-15 | End: 2017-05-15

## 2017-05-15 RX ORDER — SODIUM CHLORIDE, SODIUM LACTATE, POTASSIUM CHLORIDE, CALCIUM CHLORIDE 600; 310; 30; 20 MG/100ML; MG/100ML; MG/100ML; MG/100ML
INJECTION, SOLUTION INTRAVENOUS PRN
Status: DISCONTINUED | OUTPATIENT
Start: 2017-05-15 | End: 2017-05-17 | Stop reason: HOSPADM

## 2017-05-15 RX ORDER — OXYCODONE HYDROCHLORIDE AND ACETAMINOPHEN 5; 325 MG/1; MG/1
2 TABLET ORAL EVERY 4 HOURS PRN
Status: DISCONTINUED | OUTPATIENT
Start: 2017-05-15 | End: 2017-05-17 | Stop reason: HOSPADM

## 2017-05-15 RX ORDER — GABAPENTIN 300 MG/1
600 CAPSULE ORAL 3 TIMES DAILY
Status: DISCONTINUED | OUTPATIENT
Start: 2017-05-15 | End: 2017-05-15

## 2017-05-15 RX ORDER — GABAPENTIN 300 MG/1
600 CAPSULE ORAL 3 TIMES DAILY PRN
Status: DISCONTINUED | OUTPATIENT
Start: 2017-05-15 | End: 2017-05-17 | Stop reason: HOSPADM

## 2017-05-15 RX ORDER — OXYCODONE HYDROCHLORIDE AND ACETAMINOPHEN 5; 325 MG/1; MG/1
1 TABLET ORAL EVERY 4 HOURS PRN
Status: DISCONTINUED | OUTPATIENT
Start: 2017-05-15 | End: 2017-05-17 | Stop reason: HOSPADM

## 2017-05-15 RX ORDER — VITAMIN A ACETATE, BETA CAROTENE, ASCORBIC ACID, CHOLECALCIFEROL, .ALPHA.-TOCOPHEROL ACETATE, DL-, THIAMINE MONONITRATE, RIBOFLAVIN, NIACINAMIDE, PYRIDOXINE HYDROCHLORIDE, FOLIC ACID, CYANOCOBALAMIN, CALCIUM CARBONATE, FERROUS FUMARATE, ZINC OXIDE, CUPRIC OXIDE 3080; 12; 120; 400; 1; 1.84; 3; 20; 22; 920; 25; 200; 27; 10; 2 [IU]/1; UG/1; MG/1; [IU]/1; MG/1; MG/1; MG/1; MG/1; MG/1; [IU]/1; MG/1; MG/1; MG/1; MG/1; MG/1
1 TABLET, FILM COATED ORAL EVERY MORNING
Status: DISCONTINUED | OUTPATIENT
Start: 2017-05-16 | End: 2017-05-17 | Stop reason: HOSPADM

## 2017-05-15 RX ORDER — IBUPROFEN 800 MG/1
800 TABLET ORAL EVERY 8 HOURS PRN
Status: DISCONTINUED | OUTPATIENT
Start: 2017-05-15 | End: 2017-05-17 | Stop reason: HOSPADM

## 2017-05-15 RX ORDER — ROPIVACAINE HYDROCHLORIDE 2 MG/ML
INJECTION, SOLUTION EPIDURAL; INFILTRATION; PERINEURAL
Status: COMPLETED
Start: 2017-05-15 | End: 2017-05-15

## 2017-05-15 RX ORDER — LIDOCAINE HYDROCHLORIDE 10 MG/ML
INJECTION, SOLUTION INFILTRATION; PERINEURAL
Status: COMPLETED
Start: 2017-05-15 | End: 2017-05-15

## 2017-05-15 RX ORDER — SODIUM CHLORIDE, SODIUM LACTATE, POTASSIUM CHLORIDE, CALCIUM CHLORIDE 600; 310; 30; 20 MG/100ML; MG/100ML; MG/100ML; MG/100ML
INJECTION, SOLUTION INTRAVENOUS CONTINUOUS
Status: DISPENSED | OUTPATIENT
Start: 2017-05-15 | End: 2017-05-15

## 2017-05-15 RX ORDER — ACETAMINOPHEN 325 MG/1
325 TABLET ORAL EVERY 4 HOURS PRN
Status: DISCONTINUED | OUTPATIENT
Start: 2017-05-15 | End: 2017-05-17 | Stop reason: HOSPADM

## 2017-05-15 RX ORDER — ONDANSETRON 4 MG/1
4 TABLET, ORALLY DISINTEGRATING ORAL EVERY 6 HOURS PRN
Status: DISCONTINUED | OUTPATIENT
Start: 2017-05-15 | End: 2017-05-17 | Stop reason: HOSPADM

## 2017-05-15 RX ADMIN — DINOPROSTONE 5 MG: 20 SUPPOSITORY VAGINAL at 11:32

## 2017-05-15 RX ADMIN — OXYCODONE HYDROCHLORIDE AND ACETAMINOPHEN 1 TABLET: 5; 325 TABLET ORAL at 22:26

## 2017-05-15 RX ADMIN — IBUPROFEN 800 MG: 800 TABLET, FILM COATED ORAL at 22:26

## 2017-05-15 RX ADMIN — ONDANSETRON 4 MG: 2 INJECTION, SOLUTION INTRAMUSCULAR; INTRAVENOUS at 17:48

## 2017-05-15 RX ADMIN — SODIUM CHLORIDE, POTASSIUM CHLORIDE, SODIUM LACTATE AND CALCIUM CHLORIDE: 600; 310; 30; 20 INJECTION, SOLUTION INTRAVENOUS at 18:15

## 2017-05-15 RX ADMIN — SODIUM CHLORIDE, POTASSIUM CHLORIDE, SODIUM LACTATE AND CALCIUM CHLORIDE: 600; 310; 30; 20 INJECTION, SOLUTION INTRAVENOUS at 15:40

## 2017-05-15 RX ADMIN — ROPIVACAINE HYDROCHLORIDE 200 MG: 2 INJECTION, SOLUTION EPIDURAL; INFILTRATION at 16:17

## 2017-05-15 RX ADMIN — FENTANYL CITRATE 100 MCG: 50 INJECTION, SOLUTION INTRAMUSCULAR; INTRAVENOUS at 15:41

## 2017-05-15 RX ADMIN — SODIUM CHLORIDE, POTASSIUM CHLORIDE, SODIUM LACTATE AND CALCIUM CHLORIDE: 600; 310; 30; 20 INJECTION, SOLUTION INTRAVENOUS at 15:41

## 2017-05-15 RX ADMIN — Medication 1 MILLI-UNITS/MIN: at 16:49

## 2017-05-15 RX ADMIN — Medication 125 ML/HR: at 21:19

## 2017-05-15 RX ADMIN — FENTANYL CITRATE 100 MCG: 50 INJECTION, SOLUTION INTRAMUSCULAR; INTRAVENOUS at 12:53

## 2017-05-15 ASSESSMENT — LIFESTYLE VARIABLES
DO YOU DRINK ALCOHOL: NO
DO YOU DRINK ALCOHOL: NO
PACK_YEARS: 18 YEARS
ALCOHOL_USE: NO
EVER_SMOKED: YES

## 2017-05-15 ASSESSMENT — PATIENT HEALTH QUESTIONNAIRE - PHQ9
1. LITTLE INTEREST OR PLEASURE IN DOING THINGS: NOT AT ALL
SUM OF ALL RESPONSES TO PHQ9 QUESTIONS 1 AND 2: 0
SUM OF ALL RESPONSES TO PHQ QUESTIONS 1-9: 0

## 2017-05-15 ASSESSMENT — PAIN SCALES - GENERAL
PAINLEVEL_OUTOF10: 3
PAINLEVEL_OUTOF10: 5

## 2017-05-15 NOTE — PROGRESS NOTES
"0905 - 37 y/o  EDC , EGA 39.0, here to L&D room 215 with  Jesus. Pt here for scheduled elective IOL. Pt is A2 GDM. EFM/TOCO applied, Patient states positive FM. Denies vaginal LOF or Bleeding. VSS. Pt reports occasionally \"missing her evening dose of insulin.\"   0915 - IV started, labs collected. FSBS - 106. Melissa Mcnulty CNM at bedside to discuss poc.   09 - KEATON Mcnulty CNM at bedside to perform SVE - FT/thick/high. Will plan for prostaglandin gel cervical ripening. Pt aware of plan.   1132 - Dr Song to bedside. Strip assessed. SVE - 1/thick/high. Prostaglandin gel placed. Pt educated regarding expected cramping with gel. Pt verbalizes understanding.   1150 - RN to bedside to assess pt. Pt c/o cramping but denies any need for pain management intervention.   1230 - Pt up to bathroom. Lunch ordered for pt per MD order.   1245 - Pt having increase pain and requesting pain meds. Per Dr Song, ok for dose of fentanyl.  1253 - Fentanyl given, see MAR.   1310 - Pt no longer having extreme pain with UC's, Pt sitting up eating lunch.  1400 - Tracing reviewed by KEATON Mcnulty CNM. , pt to ambulated in hallway.   1402 - Pt up ambulating in hallway.  1440 - Pt back from ambulating in hallway. Pt \"not able to walk anymore.\" Pt back to bed and monitors placed on pt.  1445 - FSBS - 155. Dr Snog and CHULAM aware, no new orders.   1528 - Per MD, RN to check pt's cervix. RN to bedside. SVE-3/50/-2. Pt having an increase in pain and requesting pain meds.   1540 - Pain meds given via IV. LR bolus started for epidural. Consents signed.   1554 - Dr Oden at bedside for epidural placement.   1607 - Test dose given, increase of heart rate noted from 80's to 110. Dr Oden removed epidural and will plan to replace at this time.  1625 - Pt resting on right side, denies any pain with UC's at this time.  1640 - Discussed poc with Dr Song. Per MD, will plan to start low dose pitocin.    - Pitocin started, see " "MAR  1730 - Prolonged decel noted for 3 minutes down to 90's. RN to bedside pt repositioned, 02 placed on pt via mask at 10L, pitocin turned off, IV fluids increased. Per pt, \"I just felt a huge gush of water.\" Upon assessment large amount of amniotic fluid noted on pad. SVE - 5/80/-1.   1733 - Prolonged decel for 4 minutes noted. Pt repositioned. Charge RN called to bedside for assistance. FSE placed.    - FHT's back to baseline of 130's. Pt educated and emotional support given to pt.    - Dr Song notified of SROM and current fetal heart tracing. Per MD, ok to restart pitocin at this time. Pit restarted at 1 court-unit/min.    - Pt calling RN to room. Pt feeling pressure and stating \"something is coming out.\" SVE - complete/+2. Dr Song notified.    - Dr Song at bedside for delivery of viable baby girl, apgar 8/9. No tears/lacs. Baby up to mother's chest, bonding well.    - Report given to KARRIE Calero RN. All questions answered.     "

## 2017-05-15 NOTE — IP AVS SNAPSHOT
5/17/2017    Annabel Bethea  [address]  [facility] [address]    Dear Annabel:    [facility] wants to ensure your discharge home is safe and you or your loved ones have had all of your questions answered regarding your care after you leave the hospital.    Below is a list of resources and contact information should you have any questions regarding your hospital stay, follow-up instructions, or active medical symptoms.    Questions or Concerns Regarding… Contact   Medical Questions Related to Your Discharge  (7 days a week, 8am-5pm) Contact a Nurse Care Coordinator   [phone]   Medical Questions Not Related to Your Discharge  (24 hours a day / 7 days a week)  Contact the Nurse Health Line   [phone]    Medications or Discharge Instructions Refer to your discharge packet   or contact your [facility] Primary Care Provider   [phone]   Follow-up Appointment(s) Schedule your appointment via [employer]   or contact Scheduling [phone]   Billing Review your statement via [employer]  or contact Billing [phone]   Medical Records Review your records via [employer]   or contact Medical Records [phone]     You may receive a telephone call within two days of discharge. This call is to make certain you understand your discharge instructions and have the opportunity to have any questions answered. You can also easily access your medical information, test results and upcoming appointments via the [employer] free online health management tool. You can learn more and sign up at [email or web address]. For assistance setting up your [employer] account, please call [phone].    Once again, we want to ensure your discharge home is safe and that you have a clear understanding of any next steps in your care. If you have any questions or concerns, please do not hesitate to contact us, we are here for you. Thank you for choosing [facility] for your healthcare needs.    Sincerely,    Your [facility] Healthcare Team

## 2017-05-15 NOTE — IP AVS SNAPSHOT
Linebacker Access Code: Activation code not generated  Current Linebacker Status: Patient Declined    [email or web address]  A secure, online tool to manage your health information     [employer]’s Linebacker® is a secure, online tool that connects you to your personalized health information from the privacy of your home -- day or night - making it very easy for you to manage your healthcare. Once the activation process is completed, you can even access your medical information using the Linebacker tejas, which is available for free in the [employer] store or Google Play store.     Linebacker provides the following levels of access (as shown below):   My Chart Features   [facility] Primary Care Doctor [facility]  Specialists [facility]  Urgent  Care Non-[facility]  Primary Care  Doctor   Email your healthcare team securely and privately 24/7 X X X X   Manage appointments: schedule your next appointment; view details of past/upcoming appointments X      Request prescription refills. X      View recent personal medical records, including lab and immunizations X X X X   View health record, including health history, allergies, medications X X X X   Read reports about your outpatient visits, procedures, consult and ER notes X X X X   See your discharge summary, which is a recap of your hospital and/or ER visit that includes your diagnosis, lab results, and care plan. X X       How to register for Linebacker:  1. Go to  [email or web address].  2. Click on the Sign Up Now box, which takes you to the New Member Sign Up page. You will need to provide the following information:  a. Enter your Linebacker Access Code exactly as it appears at the top of this page. (You will not need to use this code after you’ve completed the sign-up process. If you do not sign up before the expiration date, you must request a new code.)   b. Enter your date of birth.   c. Enter your home email address.   d. Click Submit, and follow the next screen’s instructions.  3. Create a Linebacker ID.  This will be your [employer] login ID and cannot be changed, so think of one that is secure and easy to remember.  4. Create a [employer] password. You can change your password at any time.  5. Enter your Password Reset Question and Answer. This can be used at a later time if you forget your password.   6. Enter your e-mail address. This allows you to receive e-mail notifications when new information is available in [employer].  7. Click Sign Up. You can now view your health information.    For assistance activating your [employer] account, call [phone]

## 2017-05-15 NOTE — H&P
History and Physical    Annabel Bethea is a 36 y.o. female  -Para:     Gestational Age:  39w0d  Admitted for:   Induction of Labor  Admitted to  [facility] Labor and Delivery.  Patient [facility]: [facility]    HPI: Patient is admitted with the above mentioned Chief Complaint and States   Loss of fluid:   negative  Abdominal Pain:  negative  Uterine Contractions:  negative  Vaginal Bleeding:  negative  Fetal Movement:  normal  Patient denies fever, chills, nausea, vomiting , headache, visual disturbance, or dysuria  Patient's last menstrual period was 08/15/2016.  Estimated Date of Delivery: 17  Final EDVIN: 2017, by Last Menstrual Period    Patient Active Problem List    Diagnosis Date Noted   • Insulin controlled gestational diabetes mellitus (GDM) in third trimester 2017   • Non-compliant patient 2017   • Chronic low back pain with sciatica 2017   • Supervision of high risk pregnancy, antepartum 2016   • History of gestational diabetes in prior pregnancy, currently pregnant 2016       Admitting DX: Pregnancy   Pregnancy Complications:  gestational diabetes  OB Risk Factors:   advanced maternal age  Labor State:    Not in labor.    History:   has a past medical history of Allergy; Anxiety (); Diabetes (CMS-HCC); Stroke (CMS-HCC); and Sciatic pain.     has past surgical history that includes cholecystectomy.    OB History    Para Term  AB SAB TAB Ectopic Multiple Living   6 5 5       5      # Outcome Date GA Lbr Alberto/2nd Weight Sex Delivery Anes PTL Lv   6 Current            5 Term 06/03/10 40w0d  3.629 kg (8 lb) F Vag-Spont EPI N Y      Comments: GDM A2, had anxiety.   4 Term 09 40w0d  3.629 kg (8 lb) F Vag-Spont EPI N Y      Comments: Pt states had a high risk pregnancy, GDM A2   3 Term 05 41w0d   M Vag-Spont EPI N Y      Comments: Pt states was induced. Pt. states son is disabled.    2 Term 04 40w0d   "3.629 kg (8 lb) M Vag-Spont EPI N Y      Comments: Pt states no complications.    1 Term 08/15/97 40w0d  3.629 kg (8 lb) F Vag-Spont EPI N Y      Comments: Pt states no complications.          Medications:  No current facility-administered medications on file prior to encounter.     Current Outpatient Prescriptions on File Prior to Encounter   Medication Sig Dispense Refill   • gabapentin (NEURONTIN) 600 MG tablet Take 1 Tab by mouth 3 times a day. 90 Tab 3   • insulin NPH (HUMULIN,NOVOLIN) 100 UNIT/ML Suspension Patient to inject 10 units of NPH insulin at bed time. 1 Vial 0   • Insulin Syringes, Disposable, U-100 0.5 ML Misc Use 3 times daily. 100 Each 3   • Insulin Syringe-Needle U-100 26G X 1/2\" 1 ML Misc 1 Units by Does not apply route 3 times a day. 100 Each 3   • ONETOUCH DELICA LANCETS FINE Misc 1 Stick by Does not apply route 4 times a day. 100 Each 1   • glucose blood (ONETOUCH VERIO) strip Patient to check blood sugar 4 times a day. 100 Strip 1   • Acetaminophen-Codeine 300-30 MG Tab Take 1-2 Tabs by mouth every four hours as needed. 30 Tab 0   • gabapentin (NEURONTIN) 300 MG Cap Take 1 Cap by mouth 3 times a day. 90 Cap 0   • gabapentin (NEURONTIN) 300 MG Cap Take 2 Caps by mouth 3 times a day. 90 Cap 3   • Acetaminophen-Codeine 300-30 MG Tab Take 1-2 Tabs by mouth every four hours as needed. 30 Tab 0   • ondansetron (ZOFRAN) 4 MG Tab tablet Take 1 Tab by mouth every four hours as needed. 20 Tab 0   • bisacodyl (DULCOLAX) 5 MG EC tablet Take 2 Tabs by mouth as needed. 30 Tab 0   • Acetaminophen (TYLENOL EXTRA STRENGTH PO) Take  by mouth.     • Doxylamine-Pyridoxine 10-10 MG Tablet Delayed Response delayed-release tablet Take 1 Tab by mouth 4 times a day as needed (nausea). 60 Tab 0   • oxycodone-acetaminophen (PERCOCET) 5-325 MG Tab Take 1-2 Tabs by mouth every 6 hours as needed for Moderate Pain (pain). 20 Tab 0       Allergies:  Peanut-derived    ROS:   Neuro: negative    Cardiovascular: " "negative  Gastro intestinal: negative  Genitourinary: negative            Physical Exam:  /81 mmHg  Pulse 98  Ht 1.702 m (5' 7\")  Wt 106.595 kg (235 lb)  BMI 36.80 kg/m2  LMP 08/15/2016  Constitutional: healthy-appearing, Well-developed, well-nourished, in no acute distress  No JVD: while supine  HEENT: EOMI, Neck supple with midline trachea, Thyroid without masses and Trachea midline  Breast Exam: negative  Cardio: regularly rate and rhythm  Lung: unlabored respirations, no intercostal retractions or accessory muscle use, clear to auscultation without rales or wheezes  Abdomen: abdomen is soft without significant tenderness, masses, organomegaly or guarding  Extremity: extremities, peripheral pulses and reflexes normal, no edema, redness or tenderness in the calves or thighs, feet normal, good pulses, normal color, temperature and sensation    Cervical Exam: 50%  Cervix Dilatation: 1  Station: negative 2  Pelvis: Adequate  Fetal Assessment: Fetal heart variability: moderate  Fetal Heart Rate decelerations: variable  Fetal Heart Rate accelerations: yes  Baseline FHR: 140 per minute  Uterine contractions: irregular, every 2-7 minutes  Estimated Fetal Weight: 3500 - 4000g      Labs:      Prenatal Results         1st Trimester Date Time   ABO  B  12/09/16    RH  POS  12/09/16    Antibody  NEG  12/09/16    CBC/PLT/DIFF      HGB  13.9  g/dL  04/14/17    Platelets  198 K/uL 03/02/17 0700   HGB A1C   6.7  %  05/09/17    1 Hr GCT  212  mg/dL  04/14/17    3 Hr GTT      Rubella  IMMUNE  12/09/16    RPR  NON REACTIVE  12/09/16    Urine Culture      24 Urine Protein       24 Urine Creatinine       HBsAg  NEG  12/09/16    Hep CAB       HIV  NON REACTIVE  12/09/16    Gonorrhea  NEG  11/17/16    Chlamydia  NEG  11/17/16    TSH       Free T4        TB      Pap  NEG  11/17/16    SYPHILUS TREP QUAL [id number] [[id number]][      2nd Trimester Date Time   HCT  39.7  %  04/14/17    HGB  13.9  g/dL  04/14/17    1 Hr GCT  212  mg/dL  " 17    3 Hr GTT      24-28 Weeks Date Time   1 Hr GCT   212  mg/dL  17    TSH       Free T4       24 Urine Protein      24 Urine Creatinine      BUN  4 mg/dL (L) 17 0700   Creatinine  0.47 mg/dL (L) 17 0700   GFR  >60 mL/min/1.73 m 2 17 0700   AST  26 U/L 17 0700   ALT  18 U/L 17 0700   Uric Acid      LDH      3rd Trimester Date Time   HCT  39.7  %  17    HGB  13.9  g/dL  17    TSH      Free T4      24 Hr Urine Protein      24 Hr Urine Creatinine      SYPHILUS TREP QUAL  NOT DETECTED  17    35-37 Weeks Date Time   GBS PCR LB  NEG  17    GBS PCR  NEG  17    Genetic Screening Date Time   Cystic Fibrosis      AFP Quad      Sickle Cell                  View all results for this pregnancy            Assessment:  Gestational Age:  39w0d  Labor State:   Labor, Active  Risk Factors:   advanced maternal age, GDM A-2, obesity  Pregnancy Complications: gestational diabetes    Patient Active Problem List    Diagnosis Date Noted   • Insulin controlled gestational diabetes mellitus (GDM) in third trimester 2017   • Non-compliant patient 2017   • Chronic low back pain with sciatica 2017   • Supervision of high risk pregnancy, antepartum 2016   • History of gestational diabetes in prior pregnancy, currently pregnant 2016       Plan:   Admitted for: Induction of Labor    CBC  routine urinalysis  Antibiotics: Not indicated at this time    Will start cervical ripening with PGE-2 gel, then pitocin as necessary  Will check BS q 4 hours as she has been non-compliant with her nightly insulin regimen  FSBS- 106 now    Case discussed with Dr Song who is the attending MD today    Melissa Mcnulty C.N.M.

## 2017-05-15 NOTE — CARE PLAN
Problem: Knowledge Deficit  Goal: Patient/Support person demonstrates understanding regarding the progression of labor, available options and participates in decision-making process  Outcome: PROGRESSING AS EXPECTED  POC discussed with pt, all questions answered.     Problem: Discharge Barriers/Planning  Goal: Patient’s Continuum of care needs are met  Outcome: PROGRESSING AS EXPECTED    Problem: Pain  Goal: Alleviation of Pain or a reduction in pain to the patient’s comfort goal  Outcome: PROGRESSING AS EXPECTED  Pain management discussed with pt. Pt plans for epidural, once in active labor.

## 2017-05-16 LAB
ERYTHROCYTE [DISTWIDTH] IN BLOOD BY AUTOMATED COUNT: 47.9 FL (ref 35.9–50)
HCT VFR BLD AUTO: 31.7 % (ref 37–47)
HGB BLD-MCNC: 10.5 G/DL (ref 12–16)
MCH RBC QN AUTO: 31.4 PG (ref 27–33)
MCHC RBC AUTO-ENTMCNC: 33.7 G/DL (ref 33.6–35)
MCV RBC AUTO: 93.4 FL (ref 81.4–97.8)
PLATELET # BLD AUTO: 157 K/UL (ref 164–446)
PMV BLD AUTO: 12.6 FL (ref 9–12.9)
RBC # BLD AUTO: 3.34 M/UL (ref 4.2–5.4)
WBC # BLD AUTO: 11.5 K/UL (ref 4.8–10.8)

## 2017-05-16 PROCEDURE — 700102 HCHG RX REV CODE 250 W/ 637 OVERRIDE(OP): Performed by: NURSE PRACTITIONER

## 2017-05-16 PROCEDURE — 700102 HCHG RX REV CODE 250 W/ 637 OVERRIDE(OP): Performed by: PHYSICIAN ASSISTANT

## 2017-05-16 PROCEDURE — 85027 COMPLETE CBC AUTOMATED: CPT

## 2017-05-16 PROCEDURE — 700112 HCHG RX REV CODE 229: Performed by: FAMILY MEDICINE

## 2017-05-16 PROCEDURE — A9270 NON-COVERED ITEM OR SERVICE: HCPCS | Performed by: PHYSICIAN ASSISTANT

## 2017-05-16 PROCEDURE — 770002 HCHG ROOM/CARE - OB PRIVATE (112)

## 2017-05-16 PROCEDURE — A9270 NON-COVERED ITEM OR SERVICE: HCPCS | Performed by: NURSE PRACTITIONER

## 2017-05-16 PROCEDURE — 36415 COLL VENOUS BLD VENIPUNCTURE: CPT

## 2017-05-16 PROCEDURE — A9270 NON-COVERED ITEM OR SERVICE: HCPCS | Performed by: FAMILY MEDICINE

## 2017-05-16 RX ORDER — IBUPROFEN 800 MG/1
800 TABLET ORAL EVERY 8 HOURS PRN
Qty: 30 TAB | Refills: 0 | Status: SHIPPED | OUTPATIENT
Start: 2017-05-16 | End: 2017-07-12

## 2017-05-16 RX ORDER — DIPHENHYDRAMINE HCL 25 MG
50 TABLET ORAL EVERY 6 HOURS PRN
Status: DISCONTINUED | OUTPATIENT
Start: 2017-05-16 | End: 2017-05-17 | Stop reason: HOSPADM

## 2017-05-16 RX ORDER — DOCUSATE SODIUM 100 MG/1
100 CAPSULE, LIQUID FILLED ORAL 2 TIMES DAILY
Status: DISCONTINUED | OUTPATIENT
Start: 2017-05-16 | End: 2017-05-17 | Stop reason: HOSPADM

## 2017-05-16 RX ADMIN — DIPHENHYDRAMINE HCL 50 MG: 25 TABLET ORAL at 05:05

## 2017-05-16 RX ADMIN — OXYCODONE HYDROCHLORIDE AND ACETAMINOPHEN 1 TABLET: 5; 325 TABLET ORAL at 23:10

## 2017-05-16 RX ADMIN — IBUPROFEN 800 MG: 800 TABLET, FILM COATED ORAL at 14:58

## 2017-05-16 RX ADMIN — OXYCODONE HYDROCHLORIDE AND ACETAMINOPHEN 1 TABLET: 5; 325 TABLET ORAL at 10:35

## 2017-05-16 RX ADMIN — OXYCODONE HYDROCHLORIDE AND ACETAMINOPHEN 1 TABLET: 5; 325 TABLET ORAL at 18:57

## 2017-05-16 RX ADMIN — OXYCODONE HYDROCHLORIDE AND ACETAMINOPHEN 1 TABLET: 5; 325 TABLET ORAL at 06:26

## 2017-05-16 RX ADMIN — IBUPROFEN 800 MG: 800 TABLET, FILM COATED ORAL at 23:10

## 2017-05-16 RX ADMIN — Medication 1 TABLET: at 10:35

## 2017-05-16 RX ADMIN — IBUPROFEN 800 MG: 800 TABLET, FILM COATED ORAL at 06:26

## 2017-05-16 RX ADMIN — OXYCODONE HYDROCHLORIDE AND ACETAMINOPHEN 1 TABLET: 5; 325 TABLET ORAL at 14:58

## 2017-05-16 RX ADMIN — OXYCODONE HYDROCHLORIDE AND ACETAMINOPHEN 1 TABLET: 5; 325 TABLET ORAL at 02:25

## 2017-05-16 RX ADMIN — DOCUSATE SODIUM 100 MG: 100 CAPSULE ORAL at 16:20

## 2017-05-16 ASSESSMENT — PAIN SCALES - GENERAL
PAINLEVEL_OUTOF10: 5
PAINLEVEL_OUTOF10: 4
PAINLEVEL_OUTOF10: 6
PAINLEVEL_OUTOF10: 3
PAINLEVEL_OUTOF10: 5
PAINLEVEL_OUTOF10: 0
PAINLEVEL_OUTOF10: 6
PAINLEVEL_OUTOF10: 5
PAINLEVEL_OUTOF10: 0

## 2017-05-16 ASSESSMENT — LIFESTYLE VARIABLES: DO YOU DRINK ALCOHOL: NO

## 2017-05-16 NOTE — PROGRESS NOTES
Pt arrived via wheelchair with belongings to room S316 . Report received from Mary BEST from L&D. Pt oriented to room.  Assessment done. Fundus firm, lochia light. Vital signs stable. IV infusing 125ml/hr of pitocin. Pt states pain is tollerable, see MAR. Pt aware of dangers related to sleeping with infant, reviewed plan of care with pt, and encouraged to call with needs and prior to ambulating. Call light within reach. Will continue to monitor.

## 2017-05-16 NOTE — PROGRESS NOTES
1900: Report received from NASIR Paraad at bedside.  Fundus firm, light bleeding, pt signed hep B form for baby-in chart.  2140: Pt up to void, gown and pad changed  2150: Pt transferred to PPU via wheelchair, holding baby.  2200: Report given to NASIR Travis at bedside.

## 2017-05-16 NOTE — PROGRESS NOTES
Pt complains of itching. Called and spoke with Gertrude Oliva. New orders received for benadryl 50mg po q6 prn for itching

## 2017-05-16 NOTE — PROGRESS NOTES
Angelita Nichols R.N. Lactation Consultant Signed  Progress Notes 5/16/2017  3:32 PM      Expand All Collapse All    Lactation note:    Met with mother briefly. This is her 6th baby. Her other children range in age from 5 to 20 years. Mother was insulin controlled gestational diabetes mellitus in the 3rd trimester. Baby's birth weight was 3.175kg (7lb) She states that baby is latching well and nursing for 10 to 20 minutes per breast. She said her right nipple is a little sore. We discussed how to get and keep baby in a deep latch using pillows to support her and the baby so she won't slip to the tip of the nipple. Elina RN, IBCLC

## 2017-05-16 NOTE — CARE PLAN
Problem: Altered physiologic condition related to immediate post-delivery state and potential for bleeding/hemorrhage  Goal: Patient physiologically stable as evidenced by normal lochia, palpable uterine involution and vital signs within normal limits  Outcome: PROGRESSING AS EXPECTED  Assessment done,fundus firm, light lochia. Fundal massage done.

## 2017-05-16 NOTE — DISCHARGE SUMMARY
[facility]  NORMAL SPONTANEOUS VAGINAL DISCHARGE SUMMARY    PATIENT ID:  NAME:  Annabel Bethea  MRN:               [id number]  YOB: 1980    DATE OF ADMISSION: 5/15/2017    DATE OF DISCHARGE: 2017     ADMITTING DIAGNOSIS: Intrauterine pregnancy at 39w1d.    DISCHARGE DIAGNOSIS: s/p     HOSPITAL COURSE: This is a 36 y.o. year old female admitted at [place] [address] at 39w1d who presented with negative contractions, negative LOF, no vaginal bleeding, normal FM and not in  Labor, here for IOL. Pt was 1 cm dilated, 50% effaced and at  -2 station on sterile vaginal exam. Pregnancy was uncomplicated/complicated by gestational diabetes and marginal compliance with treatment. The patient had a good labor pattern after admission and proceeded to deliver a viable female infant weighing  7 lbs and 0 oz. Infants Apgars scores were 8 and 9 at one and five minutes. The patients postpartum course was uncomplicated and she was discharged home in stable condition on postpartum day #1.    PROCEDURES PERFORMED: Normal spontaneous vaginal delivery over intact perineum  . Upon vaginal exam no laceration was noted     COMPLICATIONS: none    DIET: diabetic    ACTIVITY: No intercourse and nothing inserted into the vagina for 5 weeks.    MEDICATIONS:  No current outpatient prescriptions on file.         FOLLOWUP:  1) The pregnancy center in 5 weeks  2) Return to the hospital if copious vaginal bleeding or foul smelling discharge is noted    Soham Logan M.D.

## 2017-05-16 NOTE — PROGRESS NOTES
Called and spoke with Gertrude and received new orders for Gabapentin 600mg po tid for pt's nerve pain.

## 2017-05-16 NOTE — CARE PLAN
Problem: Altered physiologic condition related to immediate post-delivery state and potential for bleeding/hemorrhage  Goal: Patient physiologically stable as evidenced by normal lochia, palpable uterine involution and vital signs within normal limits  Outcome: PROGRESSING AS EXPECTED  Vital signs stable. Fundus firm, lochia light     Problem: Alteration in comfort related to episiotomy, vaginal repair and/or after birth pains  Goal: Patient verbalizes acceptable pain level  Outcome: PROGRESSING AS EXPECTED  Pt states pain at acceptable level at this time

## 2017-05-16 NOTE — DELIVERY NOTE
Vaginal Delivery Note    Annabel Bethea is a  6, now para 6, who presented not in labor at 39w0d.  Patient progressed in active labor with pitocin augmentation/induction to cervical exam 100%; cervical dilation 10; station +2 to complete the first stage of labor.  Patient then progressed through second stage and delivered spontaneously a viable female infant over an intact perineum.  Nuchal cord present.  Infant Apgar 8 and 9, and weight pending.    During the third stage the placenta was delivered spontaneously and was intact with 3 vessels    Assisted extraction:  none    Perineum repair:  none    Analgesia: N/A    Epidural:  yes    Family support:  yes    Infant bonding:  excellent    Estimated blood loss:  300 mL    Sponge count correct:  yes    Patient tolerated the procedure:  Excellent    Melissa Mcnulty CNM    Delivery attended by Dr Song who was present for the entire delivery

## 2017-05-17 VITALS
OXYGEN SATURATION: 96 % | HEIGHT: 67 IN | WEIGHT: 235 LBS | RESPIRATION RATE: 16 BRPM | BODY MASS INDEX: 36.88 KG/M2 | TEMPERATURE: 98 F | HEART RATE: 85 BPM | SYSTOLIC BLOOD PRESSURE: 123 MMHG | DIASTOLIC BLOOD PRESSURE: 69 MMHG

## 2017-05-17 PROCEDURE — 700102 HCHG RX REV CODE 250 W/ 637 OVERRIDE(OP): Performed by: NURSE PRACTITIONER

## 2017-05-17 PROCEDURE — 700112 HCHG RX REV CODE 229: Performed by: FAMILY MEDICINE

## 2017-05-17 PROCEDURE — A9270 NON-COVERED ITEM OR SERVICE: HCPCS | Performed by: NURSE PRACTITIONER

## 2017-05-17 PROCEDURE — A9270 NON-COVERED ITEM OR SERVICE: HCPCS | Performed by: FAMILY MEDICINE

## 2017-05-17 RX ORDER — PSEUDOEPHEDRINE HCL 30 MG
100 TABLET ORAL 2 TIMES DAILY
Qty: 60 CAP | Refills: 1 | Status: SHIPPED | OUTPATIENT
Start: 2017-05-17 | End: 2017-07-12

## 2017-05-17 RX ADMIN — OXYCODONE HYDROCHLORIDE AND ACETAMINOPHEN 1 TABLET: 5; 325 TABLET ORAL at 06:46

## 2017-05-17 RX ADMIN — Medication 1 TABLET: at 07:43

## 2017-05-17 RX ADMIN — OXYCODONE HYDROCHLORIDE AND ACETAMINOPHEN 1 TABLET: 5; 325 TABLET ORAL at 02:48

## 2017-05-17 RX ADMIN — DOCUSATE SODIUM 100 MG: 100 CAPSULE ORAL at 07:43

## 2017-05-17 RX ADMIN — IBUPROFEN 800 MG: 800 TABLET, FILM COATED ORAL at 06:46

## 2017-05-17 ASSESSMENT — COPD QUESTIONNAIRES
COPD SCREENING SCORE: 0
DURING THE PAST 4 WEEKS HOW MUCH DID YOU FEEL SHORT OF BREATH: NONE/LITTLE OF THE TIME
HAVE YOU SMOKED AT LEAST 100 CIGARETTES IN YOUR ENTIRE LIFE: NO/DON'T KNOW
DO YOU EVER COUGH UP ANY MUCUS OR PHLEGM?: NO/ONLY WITH OCCASIONAL COLDS OR INFECTIONS

## 2017-05-17 ASSESSMENT — PAIN SCALES - GENERAL
PAINLEVEL_OUTOF10: 0
PAINLEVEL_OUTOF10: 2
PAINLEVEL_OUTOF10: 0
PAINLEVEL_OUTOF10: 5
PAINLEVEL_OUTOF10: 5
PAINLEVEL_OUTOF10: 3

## 2017-05-17 ASSESSMENT — LIFESTYLE VARIABLES: EVER_SMOKED: NEVER

## 2017-05-17 NOTE — PROGRESS NOTES
Discharge instructions and follow up information reviewed with pt, expresses understanding. All questions answered.

## 2017-05-17 NOTE — PROGRESS NOTES
Received report from Maral BEST. Assessment complete. Pt states pain at acceptable level. Discussed pain management plan with pt. Pt will call for PRN pain meds. Pt educated on the dangers of sleeping with infant. Call light within reach. Pt encouraged to call with needs or concerns.

## 2017-05-17 NOTE — CARE PLAN
Problem: Alteration in comfort related to episiotomy, vaginal repair and/or after birth pains  Goal: Patient is able to ambulate, care for self and infant  Patient is able to ambulate    Problem: Potential knowledge deficit related to lack of understanding of self and  care  Goal: Patient will verbalize understanding of self and infant care  Patient verbalizes understanding to care for self and infant.

## 2017-05-17 NOTE — PROGRESS NOTES
Patient was clear for discharge yesterday however stayed an additional day secondary to  needs (heart murmur w/ ECHO). Pt remains sable and DC summary remains the same. Please see prior DC summary for pt medications and instructions    Filed Vitals:    17 0000 17 0400 17 0800 17   BP: 126/86 115/74 118/80 116/88   Pulse: 90 88 79 84   Temp: 36.6 °C (97.8 °F) 36.6 °C (97.9 °F) 36.6 °C (97.8 °F) 36.2 °C (97.2 °F)   TempSrc:       Resp: 18 18 20 19   Height:       Weight:       SpO2: 93% 93% 97% 98%       Exam:  General: Well appearing, NAD  Breast: No significant tenderness or erythem  CV: RRR, no m/r/g  Pulm: CTAB  Abdomen: Fundus firm, otherwise soft with + BS  Lochia: light  Ext: no significant edema, clubbing, or cyanosis    A/p:   Annabel Bethea is a  6, now para 6, PPD#2  -d/c home today    FOLLOWUP:  1) The pregnancy center in 5 weeks  2) Return to the hospital if copious vaginal bleeding or foul smelling discharge is noted  Soham Logan M.D.

## 2017-05-17 NOTE — DISCHARGE INSTRUCTIONS
POSTPARTUM DISCHARGE INSTRUCTIONS FOR MOM    YOB: 1980   Age: 36 y.o.               Admit Date: 5/15/2017     Discharge Date: 2017  Attending Doctor:  Theo Song M.D.                  Allergies:  Peanut-derived    Discharged to home by car. Discharged via wheelchair, hospital escort: Yes.  Special equipment needed: Not Applicable  Belongings with: Personal  Be sure to schedule a follow-up appointment with your primary care doctor or any specialists as instructed.     Discharge Plan:   Diet Plan: Discussed  Activity Level: Discussed  Smoking Cessation Offered: Patient Counseled  Confirmed Follow up Appointment: Patient to Call and Schedule Appointment  Confirmed Symptoms Management: Discussed  Medication Reconciliation Updated: No (Comments)  Influenza Vaccine Indication: Not indicated: Previously immunized this influenza season and > 8 years of age    REASONS TO CALL YOUR OBSTETRICIAN:  1.   Persistent fever or shaking chills (Temperature higher than 100.4)  2.   Heavy bleeding (soaking more than 1 pad per hour); Passing clots  3.   Foul odor from vagina  4.   Mastitis (Breast infection; breast pain, chills, fever, redness)  5.   Urinary pain, burning or frequency  6.   Episiotomy infection  7.   Abdominal incision infection  8.   Severe depression longer than 24 hours    HAND WASHING  · Prior to handling the baby.  · Before breastfeeding or bottle feeding baby.  · After using the bathroom or changing the baby's diaper.    WOUND CARE  Ask your physician for additional care instructions.  In general:    ·  Incision:      · Keep clean and dry.    · Do NOT lift anything heavier than your baby for up to 6 weeks.    · There should not be any opening or pus.      VAGINAL CARE  · Nothing inside vagina for 6 weeks: no sexual intercourse, tampons or douching.  · Bleeding may continue for 2-4 weeks.  Amount may vary.    · Call your physician for heavy bleeding which means soaking more than 1 pad  "per hour    BIRTH CONTROL  · It is possible to become pregnant at any time after delivery and while breastfeeding.  · Plan to discuss a method of birth control with your physician at your follow up visit. visit.    DIET AND ELIMINATION  · Eating more fiber (bran cereal, fruits, and vegetables) and drinking plenty of fluids will help to avoid constipation.  · Urinary frequency after childbirth is normal.    POSTPARTUM BLUES  During the first few days after birth, you may experience a sense of the \"blues\" which may include impatience, irritability or even crying.  These feeling come and go quickly.  However, as many as 1 in 10 women experience emotional symptoms known as postpartum depression.    Postpartum depression:  May start as early as the second or third day after delivery or take several weeks or months to develop.  Symptoms of \"blues\" are present, but are more intense:  Crying spells; loss of appetite; feelings of hopelessness or loss of control; fear of touching the baby; over concern or no concern at all about the baby; little or no concern about your own appearance/caring for yourself; and/or inability to sleep or excessive sleeping.  Contact your physician if you are experiencing any of these symptoms.    Crisis Hotline:  · [place] Crisis Hotline:  7-667-BTEXJWC  Or 1-958.884.8295  · [place] Crisis Hotline:  1-307.560.8717  Or 985-403-4825    PREVENTING SHAKEN BABY:  If you are angry or stressed, PUT THE BABY IN THE CRIB, step away, take some deep breaths, and wait until you are calm to care for the baby.  DO NOT SHAKE THE BABY.  You are not alone, call a supporter for help.    · Crisis Call Center 24/7 crisis line 109-889-2809 or 1-376.981.8212  · You can also text them, text \"ANSWER\" to 245461    QUIT SMOKING/TOBACCO USE:  I understand the use of any tobacco products increases my chance of suffering from future heart disease and could cause other illnesses which may shorten my life. Quitting the use of " tobacco products is the single most important thing I can do to improve my health. For further information on smoking / tobacco cessation call a Toll Free Quit Line at 1-557.207.4945 (*[employer] [place]) or 1-365.248.2717 (American Lung Association) or you can access the web based program at www.lungusa.org.    · [place] Tobacco Users Help Line:  (219) 475-8868       Toll Free: 1-462.643.7028  · Quit Tobacco Program [facility] [place] operated by [facility] (038)098-4131    DEPRESSION / SUICIDE RISK:  As you are discharged from this [facility], it is important to learn how to keep safe from harming yourself.    Recognize the warning signs:  · Abrupt changes in personality, positive or negative- including increase in energy   · Giving away possessions  · Change in eating patterns- significant weight changes-  positive or negative  · Change in sleeping patterns- unable to sleep or sleeping all the time   · Unwillingness or inability to communicate  · Depression  · Unusual sadness, discouragement and loneliness  · Talk of wanting to die  · Neglect of personal appearance   · Rebelliousness- reckless behavior  · Withdrawal from people/activities they love  · Confusion- inability to concentrate     If you or a loved one observes any of these behaviors or has concerns about self-harm, here's what you can do:  · Talk about it- your feelings and reasons for harming yourself  · Remove any means that you might use to hurt yourself (examples: pills, rope, extension cords, firearm)  · Get professional help from the community (Mental Health, Substance Abuse, psychological counseling)  · Do not be alone:Call your Safe Contact- someone whom you trust who will be there for you.  · Call your local CRISIS HOTLINE 740-9328 or 562-909-3502  · Call your local Children's Mobile Crisis Response Team [place] (153) 891-1117 or www.[employer]  · Call the toll free National Suicide Prevention Hotlines   · National  Suicide Prevention Lifeline 193-225-ADMD (9582)  · [facility] 800-SUICIDE (402-4236)    DISCHARGE SURVEY:  Thank you for choosing [facility].  We hope we provided you with very good care.  You may be receiving a survey in the mail.  Please fill it out.  Your opinion is valuable to us.    ADDITIONAL EDUCATIONAL MATERIALS GIVEN TO PATIENT:        My signature on this form indicates that:  1.  I have reviewed and understand the above information  2.  My questions regarding this information have been answered to my satisfaction.  3.  I have formulated a plan with my discharge nurse to obtain my prescribed medication for home.

## 2017-05-17 NOTE — DISCHARGE PLANNING
:    Referral: History of anxiety and post partum depression.    Intervention:  Met with MOB, Annabel Bethea who delivered her sixth baby.  The FOB is Claude Priest and he is involved and supportive.  Verified parent's address and phone number which is [address] [place] [address].  Phone number is [phone].  MOB stated she has experienced post partum depression with her other children and has concerns after this baby.  Provided MOB with a counseling resource and also encouraged MOB to contact her OB as well.  WESLEY states all of their family lives in [place], [place] and so she doesn't have a lot of help at home except for her  who works during the day.  WESLEY is prepared for infant and was provided a list of pediatricians, children's resource list, and a diaper bank referral.  WESLEY is receiving Medicaid and WIC.      Plan:  Resources provided.  Cleared for discharge per .

## 2017-05-17 NOTE — CARE PLAN
Problem: Altered physiologic condition related to immediate post-delivery state and potential for bleeding/hemorrhage  Goal: Patient physiologically stable as evidenced by normal lochia, palpable uterine involution and vital signs within normal limits  Outcome: PROGRESSING AS EXPECTED  Fundus firm, lochia light     Problem: Alteration in comfort related to episiotomy, vaginal repair and/or after birth pains  Goal: Patient is able to ambulate, care for self and infant  Outcome: PROGRESSING AS EXPECTED  Pt states pain is well controlled with use of prn medications. Pt able to ambulate and provide care for self and infant

## 2017-05-23 RX ORDER — GABAPENTIN 300 MG/1
CAPSULE ORAL
Qty: 90 CAP | Refills: 3 | Status: SHIPPED | OUTPATIENT
Start: 2017-05-23 | End: 2017-07-12

## 2017-06-01 ENCOUNTER — TELEPHONE (OUTPATIENT)
Dept: OBGYN | Facility: CLINIC | Age: 37
End: 2017-06-01

## 2017-06-01 NOTE — TELEPHONE ENCOUNTER
Returned pt's phone call. No answer. Left voice message for pt to call back. Pt spoke with Garfield DESOUZA) earlier today requesting prescription for Tylenol 3.

## 2017-06-02 ENCOUNTER — TELEPHONE (OUTPATIENT)
Dept: OBGYN | Facility: CLINIC | Age: 37
End: 2017-06-02

## 2017-06-02 NOTE — TELEPHONE ENCOUNTER
"Pt called stating she wants \"pain medication\" that her \"vagina area hurts\" after delivering on 5/15/17 and we need to give her pain medication because it hurts a lot and if we cannot should she will go to the hospital. I explained that we do not normally prescribe pain medication this 3 weeks after delivering. Pt asked if we can see her for her PP appt sooner then because her vagina hurts a lot. I explained that I can get her in sooner but I cannot guarantee that she will get pain medication, I asked her to hold while I consulted with a provider I consulted with Gertrude Pj and she said she can take Ibuprofen or that she would be glad to call in Ibuprofen 600mg in to her pharmacy but will not prescribe tylenol 3 (as requested by pt in her previous call) or any other narcotics and to explain to pt that prbably no other provider will do it either. I came back and pt had hung up. I did return her call and LVM asking her to call back.  "

## 2017-07-12 ENCOUNTER — HOSPITAL ENCOUNTER (OUTPATIENT)
Dept: RADIOLOGY | Facility: MEDICAL CENTER | Age: 37
End: 2017-07-12
Attending: NEUROLOGICAL SURGERY | Admitting: NEUROLOGICAL SURGERY
Payer: MEDICAID

## 2017-07-12 DIAGNOSIS — Z01.811 PRE-OPERATIVE RESPIRATORY EXAMINATION: ICD-10-CM

## 2017-07-12 DIAGNOSIS — Z01.810 PRE-OPERATIVE CARDIOVASCULAR EXAMINATION: ICD-10-CM

## 2017-07-12 DIAGNOSIS — Z01.812 PRE-OPERATIVE LABORATORY EXAMINATION: ICD-10-CM

## 2017-07-12 LAB
ANION GAP SERPL CALC-SCNC: 8 MMOL/L (ref 0–11.9)
APPEARANCE UR: CLEAR
APTT PPP: 26 SEC (ref 24.7–36)
BASOPHILS # BLD AUTO: 1 % (ref 0–1.8)
BASOPHILS # BLD: 0.07 K/UL (ref 0–0.12)
BILIRUB UR QL STRIP.AUTO: NEGATIVE
BUN SERPL-MCNC: 11 MG/DL (ref 8–22)
CALCIUM SERPL-MCNC: 9.4 MG/DL (ref 8.5–10.5)
CHLORIDE SERPL-SCNC: 104 MMOL/L (ref 96–112)
CO2 SERPL-SCNC: 24 MMOL/L (ref 20–33)
COLOR UR: YELLOW
CREAT SERPL-MCNC: 0.73 MG/DL (ref 0.5–1.4)
CULTURE IF INDICATED INDCX: NO UA CULTURE
EKG IMPRESSION: NORMAL
EOSINOPHIL # BLD AUTO: 0.15 K/UL (ref 0–0.51)
EOSINOPHIL NFR BLD: 2.2 % (ref 0–6.9)
ERYTHROCYTE [DISTWIDTH] IN BLOOD BY AUTOMATED COUNT: 45.9 FL (ref 35.9–50)
GFR SERPL CREATININE-BSD FRML MDRD: >60 ML/MIN/1.73 M 2
GLUCOSE SERPL-MCNC: 104 MG/DL (ref 65–99)
GLUCOSE UR STRIP.AUTO-MCNC: NEGATIVE MG/DL
HCT VFR BLD AUTO: 41 % (ref 37–47)
HGB BLD-MCNC: 13.3 G/DL (ref 12–16)
IMM GRANULOCYTES # BLD AUTO: 0.01 K/UL (ref 0–0.11)
IMM GRANULOCYTES NFR BLD AUTO: 0.1 % (ref 0–0.9)
INR PPP: 0.91 (ref 0.87–1.13)
KETONES UR STRIP.AUTO-MCNC: ABNORMAL MG/DL
LEUKOCYTE ESTERASE UR QL STRIP.AUTO: NEGATIVE
LYMPHOCYTES # BLD AUTO: 3.66 K/UL (ref 1–4.8)
LYMPHOCYTES NFR BLD: 54.1 % (ref 22–41)
MCH RBC QN AUTO: 30.4 PG (ref 27–33)
MCHC RBC AUTO-ENTMCNC: 32.4 G/DL (ref 33.6–35)
MCV RBC AUTO: 93.8 FL (ref 81.4–97.8)
MICRO URNS: ABNORMAL
MONOCYTES # BLD AUTO: 0.42 K/UL (ref 0–0.85)
MONOCYTES NFR BLD AUTO: 6.2 % (ref 0–13.4)
NEUTROPHILS # BLD AUTO: 2.45 K/UL (ref 2–7.15)
NEUTROPHILS NFR BLD: 36.4 % (ref 44–72)
NITRITE UR QL STRIP.AUTO: NEGATIVE
NRBC # BLD AUTO: 0 K/UL
NRBC BLD AUTO-RTO: 0 /100 WBC
PH UR STRIP.AUTO: 5 [PH]
PLATELET # BLD AUTO: 239 K/UL (ref 164–446)
PMV BLD AUTO: 12.5 FL (ref 9–12.9)
POTASSIUM SERPL-SCNC: 3.9 MMOL/L (ref 3.6–5.5)
PROT UR QL STRIP: NEGATIVE MG/DL
PROTHROMBIN TIME: 12.5 SEC (ref 12–14.6)
RBC # BLD AUTO: 4.37 M/UL (ref 4.2–5.4)
RBC UR QL AUTO: NEGATIVE
SODIUM SERPL-SCNC: 136 MMOL/L (ref 135–145)
SP GR UR STRIP.AUTO: 1.03
UROBILINOGEN UR STRIP.AUTO-MCNC: 0.2 MG/DL
WBC # BLD AUTO: 6.8 K/UL (ref 4.8–10.8)

## 2017-07-12 PROCEDURE — 85025 COMPLETE CBC W/AUTO DIFF WBC: CPT

## 2017-07-12 PROCEDURE — 85610 PROTHROMBIN TIME: CPT

## 2017-07-12 PROCEDURE — 81003 URINALYSIS AUTO W/O SCOPE: CPT

## 2017-07-12 PROCEDURE — 80048 BASIC METABOLIC PNL TOTAL CA: CPT

## 2017-07-12 PROCEDURE — 71010 DX-CHEST-LIMITED (1 VIEW): CPT

## 2017-07-12 PROCEDURE — 36415 COLL VENOUS BLD VENIPUNCTURE: CPT

## 2017-07-12 PROCEDURE — 85730 THROMBOPLASTIN TIME PARTIAL: CPT

## 2017-07-12 PROCEDURE — 93010 ELECTROCARDIOGRAM REPORT: CPT | Performed by: INTERNAL MEDICINE

## 2017-07-12 PROCEDURE — 93005 ELECTROCARDIOGRAM TRACING: CPT

## 2017-07-12 RX ORDER — GABAPENTIN 600 MG/1
600 TABLET ORAL 3 TIMES DAILY
COMMUNITY
End: 2018-06-18

## 2017-07-12 RX ORDER — HYDROCODONE BITARTRATE AND ACETAMINOPHEN 10; 325 MG/1; MG/1
1-2 TABLET ORAL EVERY 6 HOURS PRN
COMMUNITY
End: 2018-06-18

## 2017-07-21 ENCOUNTER — APPOINTMENT (OUTPATIENT)
Dept: RADIOLOGY | Facility: MEDICAL CENTER | Age: 37
End: 2017-07-21
Attending: NEUROLOGICAL SURGERY
Payer: MEDICAID

## 2017-07-21 ENCOUNTER — HOSPITAL ENCOUNTER (OUTPATIENT)
Facility: MEDICAL CENTER | Age: 37
End: 2017-07-21
Attending: NEUROLOGICAL SURGERY | Admitting: NEUROLOGICAL SURGERY
Payer: MEDICAID

## 2017-07-21 VITALS
HEIGHT: 67 IN | SYSTOLIC BLOOD PRESSURE: 138 MMHG | BODY MASS INDEX: 35.5 KG/M2 | DIASTOLIC BLOOD PRESSURE: 88 MMHG | TEMPERATURE: 97.9 F | OXYGEN SATURATION: 95 % | WEIGHT: 226.19 LBS | HEART RATE: 88 BPM | RESPIRATION RATE: 16 BRPM

## 2017-07-21 PROBLEM — M51.36 DEGENERATION OF LUMBAR INTERVERTEBRAL DISC: Status: ACTIVE | Noted: 2017-07-21

## 2017-07-21 LAB
HCG UR QL: NEGATIVE
SP GR UR REFRACTOMETRY: 1.02

## 2017-07-21 PROCEDURE — 501411 HCHG SPONGE, BABY LAP W/O RINGS: Performed by: NEUROLOGICAL SURGERY

## 2017-07-21 PROCEDURE — 72020 X-RAY EXAM OF SPINE 1 VIEW: CPT

## 2017-07-21 PROCEDURE — 700111 HCHG RX REV CODE 636 W/ 250 OVERRIDE (IP)

## 2017-07-21 PROCEDURE — A9270 NON-COVERED ITEM OR SERVICE: HCPCS

## 2017-07-21 PROCEDURE — 160036 HCHG PACU - EA ADDL 30 MINS PHASE I: Performed by: NEUROLOGICAL SURGERY

## 2017-07-21 PROCEDURE — 160046 HCHG PACU - 1ST 60 MINS PHASE II: Performed by: NEUROLOGICAL SURGERY

## 2017-07-21 PROCEDURE — 502626 HCHG SURGIFLO HEMOSTATIC MATRIX 6ML: Performed by: NEUROLOGICAL SURGERY

## 2017-07-21 PROCEDURE — 160035 HCHG PACU - 1ST 60 MINS PHASE I: Performed by: NEUROLOGICAL SURGERY

## 2017-07-21 PROCEDURE — 160047 HCHG PACU  - EA ADDL 30 MINS PHASE II: Performed by: NEUROLOGICAL SURGERY

## 2017-07-21 PROCEDURE — 700102 HCHG RX REV CODE 250 W/ 637 OVERRIDE(OP)

## 2017-07-21 PROCEDURE — 502240 HCHG MISC OR SUPPLY RC 0272: Performed by: NEUROLOGICAL SURGERY

## 2017-07-21 PROCEDURE — 160025 RECOVERY II MINUTES (STATS): Performed by: NEUROLOGICAL SURGERY

## 2017-07-21 PROCEDURE — 501838 HCHG SUTURE GENERAL: Performed by: NEUROLOGICAL SURGERY

## 2017-07-21 PROCEDURE — 160029 HCHG SURGERY MINUTES - 1ST 30 MINS LEVEL 4: Performed by: NEUROLOGICAL SURGERY

## 2017-07-21 PROCEDURE — 160002 HCHG RECOVERY MINUTES (STAT): Performed by: NEUROLOGICAL SURGERY

## 2017-07-21 PROCEDURE — 500885 HCHG PACK, JACKSON TABLE: Performed by: NEUROLOGICAL SURGERY

## 2017-07-21 PROCEDURE — 81025 URINE PREGNANCY TEST: CPT

## 2017-07-21 PROCEDURE — 700101 HCHG RX REV CODE 250

## 2017-07-21 PROCEDURE — 160048 HCHG OR STATISTICAL LEVEL 1-5: Performed by: NEUROLOGICAL SURGERY

## 2017-07-21 PROCEDURE — 160041 HCHG SURGERY MINUTES - EA ADDL 1 MIN LEVEL 4: Performed by: NEUROLOGICAL SURGERY

## 2017-07-21 PROCEDURE — 160009 HCHG ANES TIME/MIN: Performed by: NEUROLOGICAL SURGERY

## 2017-07-21 RX ORDER — BUPIVACAINE HYDROCHLORIDE 5 MG/ML
INJECTION, SOLUTION EPIDURAL; INTRACAUDAL
Status: DISCONTINUED | OUTPATIENT
Start: 2017-07-21 | End: 2017-07-21 | Stop reason: HOSPADM

## 2017-07-21 RX ORDER — SODIUM CHLORIDE, SODIUM LACTATE, POTASSIUM CHLORIDE, CALCIUM CHLORIDE 600; 310; 30; 20 MG/100ML; MG/100ML; MG/100ML; MG/100ML
INJECTION, SOLUTION INTRAVENOUS CONTINUOUS
Status: DISCONTINUED | OUTPATIENT
Start: 2017-07-21 | End: 2017-07-21 | Stop reason: HOSPADM

## 2017-07-21 RX ORDER — LIDOCAINE HYDROCHLORIDE 10 MG/ML
INJECTION, SOLUTION INFILTRATION; PERINEURAL
Status: COMPLETED
Start: 2017-07-21 | End: 2017-07-21

## 2017-07-21 RX ORDER — PROMETHAZINE HYDROCHLORIDE 25 MG/1
12.5-25 SUPPOSITORY RECTAL EVERY 4 HOURS PRN
Status: DISCONTINUED | OUTPATIENT
Start: 2017-07-21 | End: 2017-07-21 | Stop reason: HOSPADM

## 2017-07-21 RX ORDER — OXYCODONE HYDROCHLORIDE AND ACETAMINOPHEN 5; 325 MG/1; MG/1
1-2 TABLET ORAL EVERY 4 HOURS PRN
Status: DISCONTINUED | OUTPATIENT
Start: 2017-07-21 | End: 2017-07-21 | Stop reason: HOSPADM

## 2017-07-21 RX ORDER — LIDOCAINE AND PRILOCAINE 25; 25 MG/G; MG/G
1 CREAM TOPICAL
Status: COMPLETED | OUTPATIENT
Start: 2017-07-21 | End: 2017-07-21

## 2017-07-21 RX ORDER — MIDAZOLAM HYDROCHLORIDE 1 MG/ML
INJECTION INTRAMUSCULAR; INTRAVENOUS
Status: COMPLETED
Start: 2017-07-21 | End: 2017-07-21

## 2017-07-21 RX ORDER — KETOROLAC TROMETHAMINE 30 MG/ML
INJECTION, SOLUTION INTRAMUSCULAR; INTRAVENOUS
Status: COMPLETED
Start: 2017-07-21 | End: 2017-07-21

## 2017-07-21 RX ORDER — MORPHINE SULFATE 10 MG/ML
INJECTION, SOLUTION INTRAMUSCULAR; INTRAVENOUS
Status: COMPLETED
Start: 2017-07-21 | End: 2017-07-21

## 2017-07-21 RX ORDER — ONDANSETRON 4 MG/1
4 TABLET, ORALLY DISINTEGRATING ORAL EVERY 4 HOURS PRN
Status: DISCONTINUED | OUTPATIENT
Start: 2017-07-21 | End: 2017-07-21 | Stop reason: HOSPADM

## 2017-07-21 RX ORDER — BUPIVACAINE HYDROCHLORIDE AND EPINEPHRINE 5; 5 MG/ML; UG/ML
INJECTION, SOLUTION EPIDURAL; INTRACAUDAL; PERINEURAL
Status: DISCONTINUED | OUTPATIENT
Start: 2017-07-21 | End: 2017-07-21 | Stop reason: HOSPADM

## 2017-07-21 RX ORDER — VANCOMYCIN HCL 900 MCG/MG
POWDER (GRAM) MISCELLANEOUS
Status: DISCONTINUED | OUTPATIENT
Start: 2017-07-21 | End: 2017-07-21 | Stop reason: HOSPADM

## 2017-07-21 RX ORDER — BACITRACIN 50000 [IU]/1
INJECTION, POWDER, FOR SOLUTION INTRAMUSCULAR
Status: DISCONTINUED | OUTPATIENT
Start: 2017-07-21 | End: 2017-07-21 | Stop reason: HOSPADM

## 2017-07-21 RX ORDER — PROMETHAZINE HYDROCHLORIDE 25 MG/1
12.5-25 TABLET ORAL EVERY 4 HOURS PRN
Status: DISCONTINUED | OUTPATIENT
Start: 2017-07-21 | End: 2017-07-21 | Stop reason: HOSPADM

## 2017-07-21 RX ORDER — MORPHINE SULFATE 4 MG/ML
2-4 INJECTION, SOLUTION INTRAMUSCULAR; INTRAVENOUS
Status: DISCONTINUED | OUTPATIENT
Start: 2017-07-21 | End: 2017-07-21 | Stop reason: HOSPADM

## 2017-07-21 RX ORDER — ONDANSETRON 2 MG/ML
4 INJECTION INTRAMUSCULAR; INTRAVENOUS EVERY 4 HOURS PRN
Status: DISCONTINUED | OUTPATIENT
Start: 2017-07-21 | End: 2017-07-21 | Stop reason: HOSPADM

## 2017-07-21 RX ORDER — OXYCODONE HCL 5 MG/5 ML
SOLUTION, ORAL ORAL
Status: COMPLETED
Start: 2017-07-21 | End: 2017-07-21

## 2017-07-21 RX ORDER — METOCLOPRAMIDE HYDROCHLORIDE 5 MG/ML
10 INJECTION INTRAMUSCULAR; INTRAVENOUS
Status: DISCONTINUED | OUTPATIENT
Start: 2017-07-21 | End: 2017-07-21 | Stop reason: HOSPADM

## 2017-07-21 RX ORDER — LIDOCAINE HYDROCHLORIDE 10 MG/ML
0.5 INJECTION, SOLUTION INFILTRATION; PERINEURAL
Status: COMPLETED | OUTPATIENT
Start: 2017-07-21 | End: 2017-07-21

## 2017-07-21 RX ADMIN — FENTANYL CITRATE 50 MCG: 50 INJECTION, SOLUTION INTRAMUSCULAR; INTRAVENOUS at 11:56

## 2017-07-21 RX ADMIN — LIDOCAINE HYDROCHLORIDE 0.5 ML: 10 INJECTION, SOLUTION INFILTRATION; PERINEURAL at 09:46

## 2017-07-21 RX ADMIN — MORPHINE SULFATE 2 MG: 10 INJECTION INTRAVENOUS at 12:05

## 2017-07-21 RX ADMIN — SODIUM CHLORIDE, SODIUM LACTATE, POTASSIUM CHLORIDE, CALCIUM CHLORIDE: 600; 310; 30; 20 INJECTION, SOLUTION INTRAVENOUS at 09:46

## 2017-07-21 RX ADMIN — FENTANYL CITRATE 50 MCG: 50 INJECTION, SOLUTION INTRAMUSCULAR; INTRAVENOUS at 12:36

## 2017-07-21 RX ADMIN — KETOROLAC TROMETHAMINE 30 MG: 30 INJECTION, SOLUTION INTRAMUSCULAR at 11:53

## 2017-07-21 RX ADMIN — MIDAZOLAM 1 MG: 1 INJECTION INTRAMUSCULAR; INTRAVENOUS at 12:10

## 2017-07-21 RX ADMIN — FENTANYL CITRATE 50 MCG: 50 INJECTION, SOLUTION INTRAMUSCULAR; INTRAVENOUS at 11:51

## 2017-07-21 RX ADMIN — MORPHINE SULFATE 2 MG: 10 INJECTION INTRAVENOUS at 12:29

## 2017-07-21 RX ADMIN — OXYCODONE HYDROCHLORIDE 10 MG: 5 SOLUTION ORAL at 12:27

## 2017-07-21 RX ADMIN — FENTANYL CITRATE 50 MCG: 50 INJECTION, SOLUTION INTRAMUSCULAR; INTRAVENOUS at 12:45

## 2017-07-21 RX ADMIN — MIDAZOLAM 1 MG: 1 INJECTION INTRAMUSCULAR; INTRAVENOUS at 11:55

## 2017-07-21 ASSESSMENT — PAIN SCALES - GENERAL
PAINLEVEL_OUTOF10: 7
PAINLEVEL_OUTOF10: ASSUMED PAIN PRESENT
PAINLEVEL_OUTOF10: 9
PAINLEVEL_OUTOF10: 6
PAINLEVEL_OUTOF10: 5
PAINLEVEL_OUTOF10: 5
PAINLEVEL_OUTOF10: 9
PAINLEVEL_OUTOF10: 5
PAINLEVEL_OUTOF10: 8

## 2017-07-21 NOTE — IP AVS SNAPSHOT
7/21/2017    Annabel Bethea  [address]  [facility] [address]    Dear Annabel:    [facility] wants to ensure your discharge home is safe and you or your loved ones have had all of your questions answered regarding your care after you leave the hospital.    Below is a list of resources and contact information should you have any questions regarding your hospital stay, follow-up instructions, or active medical symptoms.    Questions or Concerns Regarding… Contact   Medical Questions Related to Your Discharge  (7 days a week, 8am-5pm) Contact a Nurse Care Coordinator   [phone]   Medical Questions Not Related to Your Discharge  (24 hours a day / 7 days a week)  Contact the Nurse Health Line   [phone]    Medications or Discharge Instructions Refer to your discharge packet   or contact your [facility] Primary Care Provider   [phone]   Follow-up Appointment(s) Schedule your appointment via [employer]   or contact Scheduling [phone]   Billing Review your statement via [employer]  or contact Billing [phone]   Medical Records Review your records via [employer]   or contact Medical Records [phone]     You may receive a telephone call within two days of discharge. This call is to make certain you understand your discharge instructions and have the opportunity to have any questions answered. You can also easily access your medical information, test results and upcoming appointments via the [employer] free online health management tool. You can learn more and sign up at [employer]. For assistance setting up your [employer] account, please call [phone].    Once again, we want to ensure your discharge home is safe and that you have a clear understanding of any next steps in your care. If you have any questions or concerns, please do not hesitate to contact us, we are here for you. Thank you for choosing [facility] for your healthcare needs.    Sincerely,    Your [facility] Healthcare Team

## 2017-07-21 NOTE — PROGRESS NOTES
The Medication Reconciliation process has been completed by interviewing the patient    Allergies have been reviewed  Antibiotic use in 30 days - none      [facility]

## 2017-07-21 NOTE — DISCHARGE INSTRUCTIONS
ACTIVITY: Rest and take it easy for the first 24 hours.  A responsible adult is recommended to remain with you during that time.  It is normal to feel sleepy.  We encourage you to not do anything that requires balance, judgment or coordination.    MILD FLU-LIKE SYMPTOMS ARE NORMAL. YOU MAY EXPERIENCE GENERALIZED MUSCLE ACHES, THROAT IRRITATION, HEADACHE AND/OR SOME NAUSEA.    FOR 24 HOURS DO NOT:  Drive, operate machinery or run household appliances.  Drink beer or alcoholic beverages.   Make important decisions or sign legal documents.    SPECIAL INSTRUCTIONS:   Change dressing in 72 hours  Keep dressing dry for 72 hours  No bending/lifting/twisting  No driving on pain medication  Return to ER with Chest pain, Shortness of Breath, or any Leg swelling  Ambulate as tolerated  Ice to incision frequently  Patient has prescriptions at home    DIET: To avoid nausea, slowly advance diet as tolerated, avoiding spicy or greasy foods for the first day.  Add more substantial food to your diet according to your physician's instructions.  Babies can be fed formula or breast milk as soon as they are hungry.  INCREASE FLUIDS AND FIBER TO AVOID CONSTIPATION.    SURGICAL DRESSING/BATHING: No tub baths or soaking of incision area until surgeon approves    FOLLOW-UP APPOINTMENT:  A follow-up appointment should be arranged with your doctor in 1-2 weeks; call to schedule Dr. Burrell [phone].    You should CALL YOUR PHYSICIAN if you develop:  Fever greater than 101 degrees F.  Pain not relieved by medication, or persistent nausea or vomiting.  Excessive bleeding (blood soaking through dressing) or unexpected drainage from the wound.  Extreme redness or swelling around the incision site, drainage of pus or foul smelling drainage.  Inability to urinate or empty your bladder within 8 hours.  Problems with breathing or chest pain.    You should call 911 if you develop problems with breathing or chest pain.  If you are unable to  contact your doctor or surgical center, you should go to the nearest emergency room or urgent care center.  Physician's telephone #: [phone]    If any questions arise, call your doctor.  If your doctor is not available, please feel free to call the Surgical Center at [phone].  The Center is open Monday through Friday from 7AM to 7PM.  You can also call the HEALTH HOTLINE open 24 hours/day, 7 days/week and speak to a nurse at [phone], or toll free at [phone].    A registered nurse may call you a few days after your surgery to see how you are doing after your procedure.    MEDICATIONS: Resume taking daily medication.  Take prescribed pain medication with food.  If no medication is prescribed, you may take non-aspirin pain medication if needed.  PAIN MEDICATION CAN BE VERY CONSTIPATING.  Take a stool softener or laxative such as senokot, pericolace, or milk of magnesia if needed.    Prescription given for AT HOME.  Last pain medication given at 12:27pm.    If your physician has prescribed pain medication that includes Acetaminophen (Tylenol), do not take additional Acetaminophen (Tylenol) while taking the prescribed medication.    Depression / Suicide Risk    As you are discharged from this [facility] facility, it is important to learn how to keep safe from harming yourself.    Recognize the warning signs:  · Abrupt changes in personality, positive or negative- including increase in energy   · Giving away possessions  · Change in eating patterns- significant weight changes-  positive or negative  · Change in sleeping patterns- unable to sleep or sleeping all the time   · Unwillingness or inability to communicate  · Depression  · Unusual sadness, discouragement and loneliness  · Talk of wanting to die  · Neglect of personal appearance   · Rebelliousness- reckless behavior  · Withdrawal from people/activities they love  · Confusion- inability to concentrate     If you or a loved one observes  any of these behaviors or has concerns about self-harm, here's what you can do:  · Talk about it- your feelings and reasons for harming yourself  · Remove any means that you might use to hurt yourself (examples: pills, rope, extension cords, firearm)  · Get professional help from the community (Mental Health, Substance Abuse, psychological counseling)  · Do not be alone:Call your Safe Contact- someone whom you trust who will be there for you.  · Call your local CRISIS HOTLINE 943-4361 or 114-342-8720  · Call your local Children's Mobile Crisis Response Team [place] (442) 007-8418 or www.[employer]  · Call the toll free National Suicide Prevention Hotlines   · National Suicide Prevention Lifeline 934-587-VQTS (4836)  · National Hope Line Network 800-SUICIDE (161-5227)

## 2017-07-21 NOTE — OR NURSING
Pt stated she spoke with the anesthesiologist about breastfeeding and will follow his instructions.  Back incision CD&I.  Pt states she has 5/10 pain but is tolerable and wants to go home.  Pt able to ambulate from room to BR and voided QS.

## 2017-07-21 NOTE — IP AVS SNAPSHOT
" Home Care Instructions                                                                                                                Name:Annabel Bethea  Medical Record Number:[id number]  CSN: [id number]    YOB: 1980   Age: 37 y.o.  Sex: female  HT:1.702 m (5' 7\") WT: 102.6 kg (226 lb 3.1 oz)          Admit Date: 7/21/2017     Discharge Date:   Today's Date: 7/21/2017  Attending Doctor:  Neeraj Burrell M.D.                  Allergies:  Peanut (diagnostic)                Discharge Instructions         ACTIVITY: Rest and take it easy for the first 24 hours.  A responsible adult is recommended to remain with you during that time.  It is normal to feel sleepy.  We encourage you to not do anything that requires balance, judgment or coordination.    MILD FLU-LIKE SYMPTOMS ARE NORMAL. YOU MAY EXPERIENCE GENERALIZED MUSCLE ACHES, THROAT IRRITATION, HEADACHE AND/OR SOME NAUSEA.    FOR 24 HOURS DO NOT:  Drive, operate machinery or run household appliances.  Drink beer or alcoholic beverages.   Make important decisions or sign legal documents.    SPECIAL INSTRUCTIONS:   Change dressing in 72 hours  Keep dressing dry for 72 hours  No bending/lifting/twisting  No driving on pain medication  Return to ER with Chest pain, Shortness of Breath, or any Leg swelling  Ambulate as tolerated  Ice to incision frequently  Patient has prescriptions at home    DIET: To avoid nausea, slowly advance diet as tolerated, avoiding spicy or greasy foods for the first day.  Add more substantial food to your diet according to your physician's instructions.  Babies can be fed formula or breast milk as soon as they are hungry.  INCREASE FLUIDS AND FIBER TO AVOID CONSTIPATION.    SURGICAL DRESSING/BATHING: *no tub baths or soaking of incision area until surgeon approves**    FOLLOW-UP APPOINTMENT:  A follow-up appointment should be arranged with your doctor in 1-2 weeks; call to schedule Dr. Burrell [phone].    You should CALL " YOUR PHYSICIAN if you develop:  Fever greater than 101 degrees F.  Pain not relieved by medication, or persistent nausea or vomiting.  Excessive bleeding (blood soaking through dressing) or unexpected drainage from the wound.  Extreme redness or swelling around the incision site, drainage of pus or foul smelling drainage.  Inability to urinate or empty your bladder within 8 hours.  Problems with breathing or chest pain.    You should call 911 if you develop problems with breathing or chest pain.  If you are unable to contact your doctor or surgical center, you should go to the nearest emergency room or urgent care center.  Physician's telephone #: [phone]    If any questions arise, call your doctor.  If your doctor is not available, please feel free to call the Surgical Center at [phone].  The Center is open Monday through Friday from 7AM to 7PM.  You can also call the [facility] HOTLINE open 24 hours/day, 7 days/week and speak to a nurse at [phone], or toll free at [phone].    A registered nurse may call you a few days after your surgery to see how you are doing after your procedure.    MEDICATIONS: Resume taking daily medication.  Take prescribed pain medication with food.  If no medication is prescribed, you may take non-aspirin pain medication if needed.  PAIN MEDICATION CAN BE VERY CONSTIPATING.  Take a stool softener or laxative such as senokot, pericolace, or milk of magnesia if needed.    Prescription given for *AT HOME**.  Last pain medication given at 12:27pm.    If your physician has prescribed pain medication that includes Acetaminophen (Tylenol), do not take additional Acetaminophen (Tylenol) while taking the prescribed medication.    Depression / Suicide Risk    As you are discharged from this [facility] facility, it is important to learn how to keep safe from harming yourself.    Recognize the warning signs:  · Abrupt changes in personality, positive or negative- including  increase in energy   · Giving away possessions  · Change in eating patterns- significant weight changes-  positive or negative  · Change in sleeping patterns- unable to sleep or sleeping all the time   · Unwillingness or inability to communicate  · Depression  · Unusual sadness, discouragement and loneliness  · Talk of wanting to die  · Neglect of personal appearance   · Rebelliousness- reckless behavior  · Withdrawal from people/activities they love  · Confusion- inability to concentrate     If you or a loved one observes any of these behaviors or has concerns about self-harm, here's what you can do:  · Talk about it- your feelings and reasons for harming yourself  · Remove any means that you might use to hurt yourself (examples: pills, rope, extension cords, firearm)  · Get professional help from the community (Mental Health, Substance Abuse, psychological counseling)  · Do not be alone:Call your Safe Contact- someone whom you trust who will be there for you.  · Call your local CRISIS HOTLINE 145-3256 or 255-703-2626  · Call your local Children's Mobile Crisis Response Team [place] (570) 512-2163 or wwwBuildingOps  · Call the toll free National Suicide Prevention Hotlines   · National Suicide Prevention Lifeline 201-871-IROG (5268)  · National Hope Line Network 800-SUICIDE (111-2716)       Medication List      CONTINUE taking these medications        Instructions    Morning Afternoon Evening Bedtime    gabapentin 600 MG tablet   Commonly known as:  NEURONTIN        Take 600 mg by mouth 3 times a day.   Dose:  600 mg                        hydrocodone/acetaminophen  MG Tabs   Commonly known as:  NORCO        Take 1-2 Tabs by mouth every 6 hours as needed.   Dose:  1-2 Tab                                Medication Information     Above and/or attached are the medications your physician expects you to take upon discharge. Review all of your home medications and newly ordered medications with your  doctor and/or pharmacist. Follow medication instructions as directed by your doctor and/or pharmacist. Please keep your medication list with you and share with your physician. Update the information when medications are discontinued, doses are changed, or new medications (including over-the-counter products) are added; and carry medication information at all times in the event of emergency situations.        Resources     Quit Smoking / Tobacco Use:    I understand the use of any tobacco products increases my chance of suffering from future heart disease or stroke and could cause other illnesses which may shorten my life. Quitting the use of tobacco products is the single most important thing I can do to improve my health. For further information on smoking / tobacco cessation call a Toll Free Quit Line at 1-687.301.7498 (*National Cancer [place]) or 1-586.377.7797 (American Lung Association) or you can access the web based program at www.lungusa.org.    [place] Tobacco Users Help Line:  (602) 581-9830       Toll Free: 1-385.317.5206  Quit Tobacco Program [facility] (551)166-5600    Crisis Hotline:    [place] Crisis Hotline:  2-835-YXXCIIT or 1-391.893.1201    [place] Crisis Hotline:    1-324.528.1754 or 884-238-1006    Discharge Survey:   Thank you for choosing [facility]. We hope we did everything we could to make your hospital stay a pleasant one. You may be receiving a survey and we would appreciate your time and participation in answering the questions. Your input is very valuable to us in our efforts to improve our service to our patients and their families.            Signatures     My signature on this form indicates that:    1. I acknowledge receipt and understanding of these Home Care Instruction.  2. My questions regarding this information have been answered to my satisfaction.  3. I have formulated a plan with my discharge nurse to obtain my prescribed medications for  home.    __________________________________      __________________________________                   Patient Signature                                 Guardian/Responsible Adult Signature      __________________________________                 __________       ________                       Nurse Signature                                               Date                 Time

## 2017-07-21 NOTE — OP REPORT
Date: 7/21/2017    Surgeon: Neeraj Burrell MD    1st assistant: Ana METZ    Preoperative diagnosis  Left L4-5 herniated disc, left L5 stenosis    Postoperative diagnosis  Left L4-5 herniated disc left L5 stenosis    Procedure performed  1. Minimally invasive left L4-5 microdiscectomy  2. Left L5 transpedicular approach decompression of the left L5 nerve root with foraminotomy   3. Left L4 hemilaminotomy  4. Left L5 hemilaminotomy  5. Intraoperative microscope for microdissection  6. Modifier 22 - patient with a BMI over 40, diabetes, hypertension. Patient's morbid obesity medical comorbidities greatly increased surgical time, over doubled the normal time for this procedure due to time positioning and during the muscle and fat dissection.    Indication for procedure  This is a 37-year-old female with symptoms of left L5 radiculopathy for approximately 9 months. She initially had symptoms during the 1st trimester of her pregnancy. She was maintained on intermittent steroids throughout her pregnancy due to her severe radiculopathy. Her symptoms persisted after delivery of her child. Microdiscectomy and decompression of left L5 nerve root was indicated for persistence of her symptoms for greater than 9 months with clinical worsening during that time. She understood the risks, benefits, alternatives to the procedure and wished to proceed    Procedure in detail  Patient was brought to the operating room and intubated by the anesthesiologist. She was placed prone on a Manny OSI table. Fluoroscopy was used to confirm the appropriate surgical level. Her skin was marked. She was prepped and draped in the usual sterile fashion. A preprocedure timeout was performed. Skin was infiltrated with half percent Marcaine with epinephrine. A 10 blade was used to sharply incise the skin and subcu tissue. Monopolar electrocautery just dissect to the muscle fascia. The muscle fascia was opened with monopolar electrocautery  and subperiosteal plane was created with monopolar electrocautery over the left hemilamina at L4 and L5. A minimally invasive self-retaining retractor was placed. Fluoroscopy she's confirm the appropriate surgical level. The microscope was brought in the field. A high-speed Midas Josr drill was used to make a hemilaminotomy on the left at L4 as well as L5. 4 mm Kerrison punch was used to enlarge this. Ligamentum flavum was resected at the L4-5 interspace. Foraminotomies created with 4 mm Kerrison over the left L5 nerve root and the left L5 hemilamina. Due to the disc bulge and the tension on the nerve, a left L5 transpedicular approaches needed to gain access over the shoulder and lateral aspect of the nerve root for decompression. This completed the high-speed Midas Josr drill and 3 mm Kerrison punch. Once this was done, the nerve was able to be decompressed laterally as well as mobilized medially. This completed with a 4 Penfield and held in place with a nerve retractor. A moderate sized contained herniated disc was noted at the L4-5 interspace. The annulus was coagulated. 11 blade was used to sharply incise the annulus. A small pituitary was used to resect the herniated L4-5 disc fragment. Pituitary was used in the intervertebral disc space to remove further damage disc. The course of the nerve, left L5 nerve, was inspected and palpated and noted to be free of any and all compression. The wound was copiously irrigated. Hemostasis was achieved. When her vancomycin powder was infiltrated deep and the wound. The wound was closed in layers. Sterile dressing applied, patient was extubated in the operating room and taken to PACU in stable condition.    Estimated blood loss  25 mL    Complications  None noted

## 2017-07-21 NOTE — OR SURGEON
Operative Report    PreOp Diagnosis: Left L4-5 HNP, Severe left lumbar Radiculopathy    PostOp Diagnosis: same    Procedure(s):  MIS Left - L4-5 MICRODISCECTOMY - Wound Class: Clean    Surgeon(s):  Neeraj Burrell M.D.    Anesthesiologist/Type of Anesthesia:  Anesthesiologist: Arthur Ball M.D./General    Surgical Staff:  Assistant: Ana Adam PA-C  Circulator: Angie Ann R.N.  Scrub Person: Kiko Gaffney  Radiology Technologist: Kathia Woodruff    Specimens:  * No specimens in log *    Estimated Blood Loss: 50ccs    Findings: see op report    Complications: None        7/21/2017 11:50 AM Ana Adam

## 2018-01-24 ENCOUNTER — NON-PROVIDER VISIT (OUTPATIENT)
Dept: OBGYN | Facility: CLINIC | Age: 38
End: 2018-01-24
Payer: MEDICAID

## 2018-01-24 DIAGNOSIS — Z32.01 PREGNANCY EXAMINATION OR TEST, POSITIVE RESULT: ICD-10-CM

## 2018-01-24 LAB
INT CON NEG: NEGATIVE
INT CON POS: POSITIVE
POC URINE PREGNANCY TEST: POSITIVE

## 2018-01-24 PROCEDURE — 81025 URINE PREGNANCY TEST: CPT | Performed by: OBSTETRICS & GYNECOLOGY

## 2018-01-25 ENCOUNTER — APPOINTMENT (OUTPATIENT)
Dept: RADIOLOGY | Facility: MEDICAL CENTER | Age: 38
End: 2018-01-25
Attending: EMERGENCY MEDICINE
Payer: MEDICAID

## 2018-01-25 ENCOUNTER — HOSPITAL ENCOUNTER (EMERGENCY)
Facility: MEDICAL CENTER | Age: 38
End: 2018-01-26
Attending: EMERGENCY MEDICINE
Payer: MEDICAID

## 2018-01-25 DIAGNOSIS — O03.4 INEVITABLE ABORTION: ICD-10-CM

## 2018-01-25 LAB
BASOPHILS # BLD AUTO: 0.9 % (ref 0–1.8)
BASOPHILS # BLD: 0.08 K/UL (ref 0–0.12)
EOSINOPHIL # BLD AUTO: 0.27 K/UL (ref 0–0.51)
EOSINOPHIL NFR BLD: 3 % (ref 0–6.9)
ERYTHROCYTE [DISTWIDTH] IN BLOOD BY AUTOMATED COUNT: 46.7 FL (ref 35.9–50)
HCT VFR BLD AUTO: 41.3 % (ref 37–47)
HGB BLD-MCNC: 14.3 G/DL (ref 12–16)
IMM GRANULOCYTES # BLD AUTO: 0.02 K/UL (ref 0–0.11)
IMM GRANULOCYTES NFR BLD AUTO: 0.2 % (ref 0–0.9)
LYMPHOCYTES # BLD AUTO: 3.7 K/UL (ref 1–4.8)
LYMPHOCYTES NFR BLD: 41.1 % (ref 22–41)
MCH RBC QN AUTO: 32.1 PG (ref 27–33)
MCHC RBC AUTO-ENTMCNC: 34.6 G/DL (ref 33.6–35)
MCV RBC AUTO: 92.8 FL (ref 81.4–97.8)
MONOCYTES # BLD AUTO: 0.57 K/UL (ref 0–0.85)
MONOCYTES NFR BLD AUTO: 6.3 % (ref 0–13.4)
NEUTROPHILS # BLD AUTO: 4.37 K/UL (ref 2–7.15)
NEUTROPHILS NFR BLD: 48.5 % (ref 44–72)
NRBC # BLD AUTO: 0 K/UL
NRBC BLD-RTO: 0 /100 WBC
PLATELET # BLD AUTO: 248 K/UL (ref 164–446)
PMV BLD AUTO: 11.3 FL (ref 9–12.9)
RBC # BLD AUTO: 4.45 M/UL (ref 4.2–5.4)
WBC # BLD AUTO: 9 K/UL (ref 4.8–10.8)

## 2018-01-25 PROCEDURE — 80053 COMPREHEN METABOLIC PANEL: CPT

## 2018-01-25 PROCEDURE — 99284 EMERGENCY DEPT VISIT MOD MDM: CPT

## 2018-01-25 PROCEDURE — 85025 COMPLETE CBC W/AUTO DIFF WBC: CPT

## 2018-01-25 PROCEDURE — 84702 CHORIONIC GONADOTROPIN TEST: CPT

## 2018-01-25 ASSESSMENT — PAIN SCALES - GENERAL: PAINLEVEL_OUTOF10: 0

## 2018-01-26 VITALS
RESPIRATION RATE: 16 BRPM | HEIGHT: 67 IN | DIASTOLIC BLOOD PRESSURE: 87 MMHG | OXYGEN SATURATION: 97 % | BODY MASS INDEX: 37.37 KG/M2 | HEART RATE: 85 BPM | TEMPERATURE: 97.2 F | WEIGHT: 238.1 LBS | SYSTOLIC BLOOD PRESSURE: 128 MMHG

## 2018-01-26 LAB
ALBUMIN SERPL BCP-MCNC: 4.3 G/DL (ref 3.2–4.9)
ALBUMIN/GLOB SERPL: 1.2 G/DL
ALP SERPL-CCNC: 82 U/L (ref 30–99)
ALT SERPL-CCNC: 15 U/L (ref 2–50)
ANION GAP SERPL CALC-SCNC: 9 MMOL/L (ref 0–11.9)
AST SERPL-CCNC: 15 U/L (ref 12–45)
B-HCG SERPL-ACNC: 2318 MIU/ML (ref 0–10)
BILIRUB SERPL-MCNC: 0.6 MG/DL (ref 0.1–1.5)
BUN SERPL-MCNC: 10 MG/DL (ref 8–22)
CALCIUM SERPL-MCNC: 9.5 MG/DL (ref 8.4–10.2)
CHLORIDE SERPL-SCNC: 104 MMOL/L (ref 96–112)
CO2 SERPL-SCNC: 25 MMOL/L (ref 20–33)
CREAT SERPL-MCNC: 0.67 MG/DL (ref 0.5–1.4)
GLOBULIN SER CALC-MCNC: 3.5 G/DL (ref 1.9–3.5)
GLUCOSE SERPL-MCNC: 105 MG/DL (ref 65–99)
NUMBER OF RH DOSES IND 8505RD: NORMAL
POTASSIUM SERPL-SCNC: 3.9 MMOL/L (ref 3.6–5.5)
PROT SERPL-MCNC: 7.8 G/DL (ref 6–8.2)
RH BLD: NORMAL
SODIUM SERPL-SCNC: 138 MMOL/L (ref 135–145)

## 2018-01-26 PROCEDURE — 700102 HCHG RX REV CODE 250 W/ 637 OVERRIDE(OP): Performed by: EMERGENCY MEDICINE

## 2018-01-26 PROCEDURE — 76817 TRANSVAGINAL US OBSTETRIC: CPT

## 2018-01-26 PROCEDURE — 86901 BLOOD TYPING SEROLOGIC RH(D): CPT

## 2018-01-26 PROCEDURE — A9270 NON-COVERED ITEM OR SERVICE: HCPCS | Performed by: EMERGENCY MEDICINE

## 2018-01-26 RX ORDER — ACETAMINOPHEN 325 MG/1
975 TABLET ORAL ONCE
Status: COMPLETED | OUTPATIENT
Start: 2018-01-26 | End: 2018-01-26

## 2018-01-26 RX ADMIN — ACETAMINOPHEN 975 MG: 325 TABLET, FILM COATED ORAL at 01:25

## 2018-01-26 ASSESSMENT — ENCOUNTER SYMPTOMS
BRUISES/BLEEDS EASILY: 0
BACK PAIN: 0
FEVER: 0
SHORTNESS OF BREATH: 0
FLANK PAIN: 0

## 2018-01-26 NOTE — DISCHARGE INSTRUCTIONS
"Miscarriage  A miscarriage is the sudden loss of an unborn baby (fetus) before the 20th week of pregnancy. Most miscarriages happen in the first 3 months of pregnancy. Sometimes, it happens before a woman even knows she is pregnant. A miscarriage is also called a \"spontaneous miscarriage\" or \"early pregnancy loss.\" Having a miscarriage can be an emotional experience. Talk with your caregiver about any questions you may have about miscarrying, the grieving process, and your future pregnancy plans.  CAUSES   · Problems with the fetal chromosomes that make it impossible for the baby to develop normally. Problems with the baby's genes or chromosomes are most often the result of errors that occur, by chance, as the embryo divides and grows. The problems are not inherited from the parents.  · Infection of the cervix or uterus.    · Hormone problems.    · Problems with the cervix, such as having an incompetent cervix. This is when the tissue in the cervix is not strong enough to hold the pregnancy.    · Problems with the uterus, such as an abnormally shaped uterus, uterine fibroids, or congenital abnormalities.    · Certain medical conditions.    · Smoking, drinking alcohol, or taking illegal drugs.    · Trauma.    Often, the cause of a miscarriage is unknown.   SYMPTOMS   · Vaginal bleeding or spotting, with or without cramps or pain.  · Pain or cramping in the abdomen or lower back.  · Passing fluid, tissue, or blood clots from the vagina.  DIAGNOSIS   Your caregiver will perform a physical exam. You may also have an ultrasound to confirm the miscarriage. Blood or urine tests may also be ordered.  TREATMENT   · Sometimes, treatment is not necessary if you naturally pass all the fetal tissue that was in the uterus. If some of the fetus or placenta remains in the body (incomplete miscarriage), tissue left behind may become infected and must be removed. Usually, a dilation and curettage (D and C) procedure is performed. " During a D and C procedure, the cervix is widened (dilated) and any remaining fetal or placental tissue is gently removed from the uterus.  · Antibiotic medicines are prescribed if there is an infection. Other medicines may be given to reduce the size of the uterus (contract) if there is a lot of bleeding.  · If you have Rh negative blood and your baby was Rh positive, you will need a Rh immunoglobulin shot. This shot will protect any future baby from having Rh blood problems in future pregnancies.  HOME CARE INSTRUCTIONS   · Your caregiver may order bed rest or may allow you to continue light activity. Resume activity as directed by your caregiver.  · Have someone help with home and family responsibilities during this time.    · Keep track of the number of sanitary pads you use each day and how soaked (saturated) they are. Write down this information.    · Do not use tampons. Do not douche or have sexual intercourse until approved by your caregiver.    · Only take over-the-counter or prescription medicines for pain or discomfort as directed by your caregiver.    · Do not take aspirin. Aspirin can cause bleeding.    · Keep all follow-up appointments with your caregiver.    · If you or your partner have problems with grieving, talk to your caregiver or seek counseling to help cope with the pregnancy loss. Allow enough time to grieve before trying to get pregnant again.    SEEK IMMEDIATE MEDICAL CARE IF:   · You have severe cramps or pain in your back or abdomen.  · You have a fever.  · You pass large blood clots (walnut-sized or larger) or tissue from your vagina. Save any tissue for your caregiver to inspect.    · Your bleeding increases.    · You have a thick, bad-smelling vaginal discharge.  · You become lightheaded, weak, or you faint.    · You have chills or fever.    MAKE SURE YOU:  · Understand these instructions.  · Will watch your condition.  · Will get help right away if you are not doing well or get  worse.     This information is not intended to replace advice given to you by your health care provider. Make sure you discuss any questions you have with your health care provider.     Document Released: 06/13/2002 Document Revised: 04/14/2014 Document Reviewed: 02/05/2013  [employer] Interactive Patient Education ©2016 [email or web address]

## 2018-01-26 NOTE — ED NOTES
Pt given written and oral discharge instructions. Pt verbalized understanding of all instructions given. Pt given f/u instructions. All questions answered. IV removed. VSS. Pt ambulating to lobby with independent gait. Pt instructed on s/s of when to return to the ER. Pt verbalized understanding.

## 2018-01-26 NOTE — ED NOTES
Chief Complaint   Patient presents with   • Vaginal Bleeding     since last night, heavier bleeding as the day has gone on (10 pads and golf ball size cots) 9 weeks and 4 days preg, seen at pregnancy center yesterday, 6 kids all living

## 2018-01-26 NOTE — ED PROVIDER NOTES
ED Provider Note     2018  12:02 AM    Means of Arrival: Walk In  History obtained by: patient  Limitations: none    CHIEF COMPLAINT  Chief Complaint   Patient presents with   • Vaginal Bleeding     since last night, heavier bleeding as the day has gone on (10 pads and golf ball size cots) 9 weeks and 4 days preg, seen at pregnancy center yesterday, 6 kids all living       [facility]  Annabel Bethea is a 37 y.o. female  reports being 9 weeks pregnant who presents with concerns of vaginal bleeding starting early this morning. She reports soaking several pads and there being clots. No abdominal pain. She says she went to the pregnancy center yesterday and had a pregnancy test that confirmed her pregnancy. She says she was told she is 9 weeks and 4 days pregnant.     REVIEW OF SYSTEMS  Review of Systems   Constitutional: Negative for fever.   Respiratory: Negative for shortness of breath.    Cardiovascular: Negative for chest pain.   Genitourinary: Negative for flank pain and hematuria.   Musculoskeletal: Negative for back pain.   Endo/Heme/Allergies: Does not bruise/bleed easily.   All other systems reviewed and are negative.    See [facility] for further details.    PAST MEDICAL HISTORY   has a past medical history of Allergy; Anxiety (); Diabetes (CMS-HCC); and Stroke (CMS-HCC).    SOCIAL HISTORY  Social History     Social History Main Topics   • Smoking status: Former Smoker     Packs/day: 0.25     Types: Cigarettes     Quit date: 10/1/2016   • Smokeless tobacco: Former User     Quit date: 2016      Comment: Pt. states last smoked when she found out she was pregnant.    • Alcohol use No   • Drug use: No   • Sexual activity: Yes     Partners: Male      Comment: none       SURGICAL HISTORY   has a past surgical history that includes cholecystectomy and lumbar laminectomy diskectomy (Left, 2017).    CURRENT MEDICATIONS  Home Medications    **Home medications have not yet been reviewed for this  "encounter**         ALLERGIES  Allergies   Allergen Reactions   • Peanut (Diagnostic) Itching     Itchy throat       PHYSICAL EXAM  VITAL SIGNS: /87   Pulse 85   Temp 36.2 °C (97.2 °F)   Resp 16   Ht 1.702 m (5' 7\")   Wt 108 kg (238 lb 1.6 oz)   LMP 08/15/2016   SpO2 97%   BMI 37.29 kg/m²     Pulse ox interpretation: I interpret this pulse ox as normal.  Constitutional: Alert in no apparent distress.  HENT: No signs of trauma, Bilateral external ears normal, Nose normal.   Eyes: Pupils are equal, Conjunctiva normal, Non-icteric.   Neck: Normal range of motion, No tenderness, Supple, No stridor.   Lymphatic: No lymphadenopathy noted.   Cardiovascular: Regular rate and rhythm, no murmurs. Symmetric distal pulses. No cyanosis of extremities. No peripheral edema of extremities.  Thorax & Lungs: Normal breath sounds, No respiratory distress, No wheezing, No chest tenderness.   Abdomen: Bowel sounds normal, Soft, obese, No tenderness, No masses, No pulsatile masses. No peritoneal signs.  Pelvic: Dark blood in vault, os appears to be open, clots with other tissue under cervix.   Skin: Warm, Dry, No erythema, No rash.   Back: No midline bony tenderness, No CVA tenderness.   Musculoskeletal: Good range of motion in all major joints. No tenderness to palpation or major deformities noted.   Neurologic: Alert , Normal motor function, Normal sensory function, No focal deficits noted.   Psychiatric: Affect normal, Judgment normal, Mood normal.   Physical Exam    DIAGNOSTIC STUDIES / PROCEDURES    LABS  Pertinent Labs & Imaging studies reviewed. (See chart for details)    RADIOLOGY  Pertinent Labs & Imaging studies reviewed. (See chart for details)    COURSE & MEDICAL DECISION MAKING  Pertinent Labs & Imaging studies reviewed. (See chart for details)    12:02 AM This is an emergent evaluation of a  37 y.o. female  @ approximately 9weeks pregnant who presents with concerns of vaginal bleeding starting today.  No " abdominal pain. Vitals normal.  The differential diagnosis includes but is not limited to threatened , ectopic pregnancy. Ordered for cbc, cmp, RH, vaginal ultrasound, serum Bhcg to evaluate.     1:01 AM  US without products of conception in uterus. I discussed findings with Mrs. Bethea, and I've told her very likely miscarriage. We talked about expectations over the next few days. She should return if fever, worsening bleeding or other serious concerns.     Rh+, will not need rhogam.    1:45 AM  Given findings on pelvic exam, minimal pain associated with bleeding, I do not think bleeding is from an ectopic pregnancy. Formal ultrasound read says ectopic cannot be excluded, but clinical picture more suggestive of inevitable .    The patient will return for worsening symptoms and is stable at the time of discharge. The patient verbalizes understanding.    FINAL IMPRESSION    ICD-10-CM   1. Inevitable  O03.9            Electronically signed by: Oscar Eduardo II, 2018 12:02 AM

## 2018-02-04 ENCOUNTER — HOSPITAL ENCOUNTER (EMERGENCY)
Facility: MEDICAL CENTER | Age: 38
End: 2018-02-05
Attending: EMERGENCY MEDICINE
Payer: MEDICAID

## 2018-02-04 ENCOUNTER — APPOINTMENT (OUTPATIENT)
Dept: RADIOLOGY | Facility: MEDICAL CENTER | Age: 38
End: 2018-02-04
Attending: EMERGENCY MEDICINE
Payer: MEDICAID

## 2018-02-04 DIAGNOSIS — M54.50 ACUTE MIDLINE LOW BACK PAIN WITHOUT SCIATICA: ICD-10-CM

## 2018-02-04 DIAGNOSIS — S60.212A CONTUSION OF LEFT WRIST, INITIAL ENCOUNTER: ICD-10-CM
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
## 2018-02-04 LAB
APPEARANCE UR: CLEAR
COLOR UR AUTO: YELLOW
GLUCOSE UR QL STRIP.AUTO: NEGATIVE MG/DL
KETONES UR QL STRIP.AUTO: NEGATIVE MG/DL
LEUKOCYTE ESTERASE UR QL STRIP.AUTO: NEGATIVE
NITRITE UR QL STRIP.AUTO: NEGATIVE
PH UR STRIP.AUTO: 6 [PH]
PROT UR QL STRIP: NEGATIVE MG/DL
RBC UR QL AUTO: NEGATIVE
SP GR UR: 1.02

## 2018-02-04 PROCEDURE — 700102 HCHG RX REV CODE 250 W/ 637 OVERRIDE(OP): Mod: EDC | Performed by: EMERGENCY MEDICINE

## 2018-02-04 PROCEDURE — 73090 X-RAY EXAM OF FOREARM: CPT | Mod: LT

## 2018-02-04 PROCEDURE — 81002 URINALYSIS NONAUTO W/O SCOPE: CPT | Mod: EDC

## 2018-02-04 PROCEDURE — 99284 EMERGENCY DEPT VISIT MOD MDM: CPT | Mod: EDC

## 2018-02-04 PROCEDURE — A9270 NON-COVERED ITEM OR SERVICE: HCPCS | Mod: EDC | Performed by: EMERGENCY MEDICINE

## 2018-02-04 RX ORDER — IBUPROFEN 600 MG/1
600 TABLET ORAL ONCE
Status: COMPLETED | OUTPATIENT
Start: 2018-02-04 | End: 2018-02-04

## 2018-02-04 RX ORDER — IBUPROFEN 600 MG/1
600 TABLET ORAL EVERY 6 HOURS PRN
Qty: 30 TAB | Refills: 0 | Status: SHIPPED | OUTPATIENT
Start: 2018-02-04 | End: 2018-07-16

## 2018-02-04 RX ORDER — LIDOCAINE 50 MG/G
1 PATCH TOPICAL EVERY 24 HOURS
Qty: 10 PATCH | Refills: 0 | Status: SHIPPED | OUTPATIENT
Start: 2018-02-04 | End: 2018-07-16

## 2018-02-04 RX ADMIN — IBUPROFEN 600 MG: 600 TABLET, FILM COATED ORAL at 23:29

## 2018-02-05 VITALS
TEMPERATURE: 97.4 F | RESPIRATION RATE: 15 BRPM | HEIGHT: 67 IN | DIASTOLIC BLOOD PRESSURE: 77 MMHG | SYSTOLIC BLOOD PRESSURE: 119 MMHG | BODY MASS INDEX: 37.65 KG/M2 | HEART RATE: 67 BPM | OXYGEN SATURATION: 99 % | WEIGHT: 239.86 LBS

## 2018-02-05 NOTE — ED PROVIDER NOTES
ED Provider Note    Scribed for Eri Lay M.D. by Gaye Gu. 2/4/2018  10:14 PM    Primary care provider: Pcp Pt States None  Means of arrival: walk-in  History obtained from: patient  History limited by: none    CHIEF COMPLAINT  Chief Complaint   Patient presents with   • Low Back Pain     since 1/25/18 when pt had a miscarriage   • Wrist Injury     GLF, injured L wrist       HPI  Annabel Bethea is a 37 y.o. female with a history of diabetes who presents to the Emergency Department for lower back pain onset 2 weeks ago. Patient states that she experienced a miscarriage of her 9 week pregnancy just prior to the back pain beginning. This discomfort has recently caused difficulty with ambulation, causing the patient to fall to the ground within the last few days. She sustained a left wrist injury at that time with associated pain and swelling. Patient is experiencing this back pain in the same area her lumbar diskectomy was completed last July. Patient is right handed.  No complaints of radiation into her leg, vaginal bleeding, numbness in groin, numbness/weakness to extremities, bowel/bladder incontinence.    REVIEW OF SYSTEMS  GI: no bowel incontinence  : no bladder incontinence or numbness to the groin  Neuro: no weakness, numbness  Musculoskeletal: lower back pain, left wrist pain and swelling    See history of present illness. All other systems are negative    PAST MEDICAL HISTORY   has a past medical history of Allergy; Anxiety (2010); Diabetes (CMS-HCC); and Stroke (CMS-HCC).    SURGICAL HISTORY   has a past surgical history that includes cholecystectomy and lumbar laminectomy diskectomy (Left, 7/21/2017).    SOCIAL HISTORY  Social History   Substance Use Topics   • Smoking status: Former Smoker     Packs/day: 0.25     Types: Cigarettes     Quit date: 10/1/2016   • Smokeless tobacco: Former User     Quit date: 9/28/2016      Comment: Pt. states last smoked when she found out she was pregnant.     • Alcohol use No      History   Drug Use No       FAMILY HISTORY  Family History   Problem Relation Age of Onset   • Diabetes Paternal Grandmother    • Cancer Paternal Grandmother      breast   • Hypertension Paternal Grandmother    • Stroke Paternal Grandmother    • Other Paternal Grandmother      gout   • Arthritis Paternal Grandmother        CURRENT MEDICATIONS  No current facility-administered medications on file prior to encounter.      Current Outpatient Prescriptions on File Prior to Encounter   Medication Sig Dispense Refill   • gabapentin (NEURONTIN) 600 MG tablet Take 600 mg by mouth 3 times a day.     • hydrocodone/acetaminophen (NORCO)  MG Tab Take 1-2 Tabs by mouth every 6 hours as needed.         ALLERGIES  Allergies   Allergen Reactions   • Peanut (Diagnostic) Itching     Itchy throat       PHYSICAL EXAM  VITAL SIGNS: /73   Pulse 83   Temp 36.4 °C (97.5 °F)   Resp 17   LMP 08/15/2016   SpO2 98%     Constitutional: Well developed, Well nourished, No acute distress, Non-toxic appearance.   HEENT: Normocephalic, Atraumatic,  external ears normal, pharynx pink,  Mucous  Membranes moist, No rhinorrhea or mucosal edema  Eyes: PERRL, EOMI, Conjunctiva normal, No discharge.   Neck: Normal range of motion, No tenderness, Supple, No stridor.   Lymphatic: No lymphadenopathy    Cardiovascular: Regular Rate and Rhythm, No murmurs,  rubs, or gallops.   Thorax & Lungs: Lungs clear to auscultation bilaterally, No respiratory distress, No wheezes, rhales or rhonchi, No chest wall tenderness.   Abdomen: Bowel sounds normal, Soft, non tender, non distended,  No pulsatile masses., no rebound guarding or peritoneal signs.   Skin: Warm, Dry, No erythema, No rash,   Back:  LS Paraspinous tenderness, with an old surgical incision which is clean dry and intact, No bony crepitance, step offs, or instability.   Neurologic: Alert & oriented x 3, Normal motor function, Normal sensory function, No focal  deficits noted. Normal reflexes. Normal strength in extremities.   Extremities: Equal, intact distal pulses, No cyanosis, clubbing   Musculoskeletal: Good range of motion in all major joints. Swollen area to left distal forearm, radial aspect. FROM at the left wrist, normal  strength and incision    DIAGNOSTIC STUDIES / PROCEDURES    LABS  Results for orders placed or performed during the hospital encounter of 02/04/18   POC UA   Result Value Ref Range    POC Color Yellow     POC Appearance Clear     POC Glucose Negative Negative mg/dL    POC Ketones Negative Negative mg/dL    POC Specific Gravity 1.025 1.005 - 1.030    POC Blood Negative Negative    POC Urine PH 6.0 5.0 - 8.0    POC Protein Negative Negative mg/dL    POC Nitrites Negative Negative    POC Leukocyte Esterase Negative Negative       All labs reviewed by me.    RADIOLOGY  DX-FOREARM LEFT   Final Result         1.  No acute traumatic bony injury.   2.  3.5 mm ulna minus variant        The radiologist's interpretation of all radiological studies have been reviewed by me.    COURSE & MEDICAL DECISION MAKING  Nursing notes, VS, PMSFHx reviewed in chart.    10:14 PM - Patient seen and examined at bedside. Ordered left forearm xray, UA to evaluate her symptoms. The differential diagnoses include but are not limited to: UTi, musculoskeletal back pain. I informed the patient that she can experience referred back pain from a miscarriage and explained that I would evaluate her wrist injury with an xray and her back pain with a UA. Patient understands and agrees.    11:39 PM I informed the patient that the wrist xray and UA were normal and she has most likely sustained a bruise. I explained that I would prescribe patches for her to place on her back to help with back pain as well. Patient understands and agrees.     The patient will return for new or worsening symptoms and is stable at the time of discharge.    DISPOSITION:  Patient will be discharged home in  stable condition.    FOLLOW UP:  [address] [id number] [phone]  Call in 1 day  for recheck, As needed, If symptoms worsen, to establish care      OUTPATIENT MEDICATIONS:  New Prescriptions    IBUPROFEN (MOTRIN) 600 MG TAB    Take 1 Tab by mouth every 6 hours as needed.    LIDOCAINE (LIDODERM) 5 % PATCH    Apply 1 Patch to skin as directed every 24 hours.       FINAL IMPRESSION  1. Acute midline low back pain without sciatica    2. Fall, initial encounter    3. Contusion of left wrist, initial encounter          Gaye MIGUEL (Scribe), am scribing for, and in the presence of, Eri Lay M.D..    Electronically signed by: Gaye Gu (Loretaibe), 2/4/2018    Eri MIGUEL M.D. personally performed the services described in this documentation, as scribed by Gaye Gu in my presence, and it is both accurate and complete.    The note accurately reflects work and decisions made by me.  Eri Lay  2/4/2018  11:47 PM

## 2018-02-05 NOTE — ED NOTES
Pt c/o lower back pain since 1/25 when she had a miscarriage. Pt describes back pain and a throbbing sensation, pain 5/10 . Pt also c/o L wrist pain after falling on it yesterday, reports pain as dull rating 6/10. Om exam pts left forearm mildly swollen and tender to touch. Pt also c/o L flank pain, sore to touch.

## 2018-02-05 NOTE — ED NOTES
Contusion and back pain discharge teaching done with pt, verbalized understanding. Prescription given for motrin, with teaching. Pt instructed to follow up with primary doctor for recheck but return to ER for any worsening condition. Pt states pain improved with splint in place, +CMS. Pt denies further questions or concerns at time of discharge. Ambulates out with steady gait with family.

## 2018-02-05 NOTE — ED TRIAGE NOTES
Annabel Bethea  37 y.o.  Chief Complaint   Patient presents with   • Low Back Pain     since 1/25/18 when pt had a miscarriage   • Wrist Injury     GLF, injured L wrist     Pt is AAO, calm and in NAD in triage. Pt has also checked her son in for asthma.    Will wait in peds lobby for a room. Aware to inform RN of an concerning changes.

## 2018-02-05 NOTE — DISCHARGE INSTRUCTIONS
Back Pain, Adult  Back pain is very common in adults. The cause of back pain is rarely dangerous and the pain often gets better over time. The cause of your back pain may not be known. Some common causes of back pain include:  · Strain of the muscles or ligaments supporting the spine.  · Wear and tear (degeneration) of the spinal disks.  · Arthritis.  · Direct injury to the back.  For many people, back pain may return. Since back pain is rarely dangerous, most people can learn to manage this condition on their own.  HOME CARE INSTRUCTIONS  Watch your back pain for any changes. The following actions may help to lessen any discomfort you are feeling:  · Remain active. It is stressful on your back to sit or  one place for long periods of time. Do not sit, drive, or  one place for more than 30 minutes at a time. Take short walks on even surfaces as soon as you are able. Try to increase the length of time you walk each day.  · Exercise regularly as directed by your health care provider. Exercise helps your back heal faster. It also helps avoid future injury by keeping your muscles strong and flexible.  · Do not stay in bed. Resting more than 1-2 days can delay your recovery.  · Pay attention to your body when you bend and lift. The most comfortable positions are those that put less stress on your recovering back. Always use proper lifting techniques, including:  ¨ Bending your knees.  ¨ Keeping the load close to your body.  ¨ Avoiding twisting.  · Find a comfortable position to sleep. Use a firm mattress and lie on your side with your knees slightly bent. If you lie on your back, put a pillow under your knees.  · Avoid feeling anxious or stressed. Stress increases muscle tension and can worsen back pain. It is important to recognize when you are anxious or stressed and learn ways to manage it, such as with exercise.  · Take medicines only as directed by your health care provider. Over-the-counter  medicines to reduce pain and inflammation are often the most helpful. Your health care provider may prescribe muscle relaxant drugs. These medicines help dull your pain so you can more quickly return to your normal activities and healthy exercise.  · Apply ice to the injured area:  ¨ Put ice in a plastic bag.  ¨ Place a towel between your skin and the bag.  ¨ Leave the ice on for 20 minutes, 2-3 times a day for the first 2-3 days. After that, ice and heat may be alternated to reduce pain and spasms.  · Maintain a healthy weight. Excess weight puts extra stress on your back and makes it difficult to maintain good posture.  SEEK MEDICAL CARE IF:  · You have pain that is not relieved with rest or medicine.  · You have increasing pain going down into the legs or buttocks.  · You have pain that does not improve in one week.  · You have night pain.  · You lose weight.  · You have a fever or chills.  SEEK IMMEDIATE MEDICAL CARE IF:   · You develop new bowel or bladder control problems.  · You have unusual weakness or numbness in your arms or legs.  · You develop nausea or vomiting.  · You develop abdominal pain.  · You feel faint.     This information is not intended to replace advice given to you by your health care provider. Make sure you discuss any questions you have with your health care provider.     Document Released: 12/18/2006 Document Revised: 01/08/2016 Document Reviewed: 04/21/2015  [employer] Interactive Patient Education ©2016 [employer]    Wrist Pain  Wrist injuries are frequent in adults and children. A sprain is an injury to the ligaments that hold your bones together. A strain is an injury to muscle or muscle cord-like structures (tendons) from stretching or pulling. Generally, when wrists are moderately tender to touch following a fall or injury, a break in the bone (fracture) may be present. Most wrist sprains or strains are better in 3 to 5 days, but complete healing may take several weeks.  HOME CARE  INSTRUCTIONS   · Put ice on the injured area.  ¨ Put ice in a plastic bag.  ¨ Place a towel between your skin and the bag.  ¨ Leave the ice on for 15-20 minutes, 3-4 times a day, for the first 2 days, or as directed by your health care provider.  · Keep your arm raised above the level of your heart whenever possible to reduce swelling and pain.  · Rest the injured area for at least 48 hours or as directed by your health care provider.  · If a splint or elastic bandage has been applied, use it for as long as directed by your health care provider or until seen by a health care provider for a follow-up exam.  · Only take over-the-counter or prescription medicines for pain, discomfort, or fever as directed by your health care provider.  · Keep all follow-up appointments. You may need to follow up with a specialist or have follow-up X-rays. Improvement in pain level is not a guarantee that you did not fracture a bone in your wrist. The only way to determine whether or not you have a broken bone is by X-ray.  SEEK IMMEDIATE MEDICAL CARE IF:   · Your fingers are swollen, very red, white, or cold and blue.  · Your fingers are numb or tingling.  · You have increasing pain.  · You have difficulty moving your fingers.  MAKE SURE YOU:   · Understand these instructions.  · Will watch your condition.  · Will get help right away if you are not doing well or get worse.     This information is not intended to replace advice given to you by your health care provider. Make sure you discuss any questions you have with your health care provider.     Document Released: 09/27/2006 Document Revised: 12/23/2014 Document Reviewed: 02/08/2012  [employer] Interactive Patient Education ©2016 [employer]

## 2018-04-25 ENCOUNTER — NON-PROVIDER VISIT (OUTPATIENT)
Dept: OBGYN | Facility: CLINIC | Age: 38
End: 2018-04-25
Payer: MEDICAID

## 2018-04-25 DIAGNOSIS — Z32.01 PREGNANCY EXAMINATION OR TEST, POSITIVE RESULT: ICD-10-CM

## 2018-04-25 LAB
INT CON NEG: NEGATIVE
INT CON POS: POSITIVE
POC URINE PREGNANCY TEST: POSITIVE

## 2018-04-25 PROCEDURE — 81025 URINE PREGNANCY TEST: CPT | Performed by: OBSTETRICS & GYNECOLOGY

## 2018-05-09 ENCOUNTER — APPOINTMENT (OUTPATIENT)
Dept: RADIOLOGY | Facility: MEDICAL CENTER | Age: 38
End: 2018-05-09
Attending: EMERGENCY MEDICINE
Payer: MEDICAID

## 2018-05-09 ENCOUNTER — TELEPHONE (OUTPATIENT)
Dept: OBGYN | Facility: CLINIC | Age: 38
End: 2018-05-09

## 2018-05-09 ENCOUNTER — HOSPITAL ENCOUNTER (EMERGENCY)
Facility: MEDICAL CENTER | Age: 38
End: 2018-05-09
Attending: EMERGENCY MEDICINE
Payer: MEDICAID

## 2018-05-09 VITALS
HEIGHT: 67 IN | SYSTOLIC BLOOD PRESSURE: 122 MMHG | BODY MASS INDEX: 34.29 KG/M2 | TEMPERATURE: 98 F | WEIGHT: 218.48 LBS | OXYGEN SATURATION: 99 % | DIASTOLIC BLOOD PRESSURE: 90 MMHG | RESPIRATION RATE: 18 BRPM | HEART RATE: 89 BPM

## 2018-05-09 DIAGNOSIS — O20.0 THREATENED ABORTION: ICD-10-CM

## 2018-05-09 DIAGNOSIS — R10.2 PELVIC PAIN: ICD-10-CM

## 2018-05-09 DIAGNOSIS — Z3A.09 9 WEEKS GESTATION OF PREGNANCY: ICD-10-CM

## 2018-05-09 DIAGNOSIS — R82.71 BACTERIA IN URINE: ICD-10-CM

## 2018-05-09 LAB
ALBUMIN SERPL BCP-MCNC: 4.2 G/DL (ref 3.2–4.9)
ALBUMIN/GLOB SERPL: 1.2 G/DL
ALP SERPL-CCNC: 51 U/L (ref 30–99)
ALT SERPL-CCNC: 9 U/L (ref 2–50)
ANION GAP SERPL CALC-SCNC: 10 MMOL/L (ref 0–11.9)
APPEARANCE UR: CLEAR
AST SERPL-CCNC: 11 U/L (ref 12–45)
B-HCG SERPL-ACNC: ABNORMAL MIU/ML (ref 0–5)
BACTERIA #/AREA URNS HPF: ABNORMAL /HPF
BASOPHILS # BLD AUTO: 0.4 % (ref 0–1.8)
BASOPHILS # BLD: 0.04 K/UL (ref 0–0.12)
BILIRUB SERPL-MCNC: 0.9 MG/DL (ref 0.1–1.5)
BILIRUB UR QL STRIP.AUTO: NEGATIVE
BUN SERPL-MCNC: 8 MG/DL (ref 8–22)
CALCIUM SERPL-MCNC: 10.2 MG/DL (ref 8.5–10.5)
CHLORIDE SERPL-SCNC: 102 MMOL/L (ref 96–112)
CO2 SERPL-SCNC: 22 MMOL/L (ref 20–33)
COLOR UR: YELLOW
CREAT SERPL-MCNC: 0.57 MG/DL (ref 0.5–1.4)
EOSINOPHIL # BLD AUTO: 0.14 K/UL (ref 0–0.51)
EOSINOPHIL NFR BLD: 1.4 % (ref 0–6.9)
EPI CELLS #/AREA URNS HPF: ABNORMAL /HPF
ERYTHROCYTE [DISTWIDTH] IN BLOOD BY AUTOMATED COUNT: 45.6 FL (ref 35.9–50)
GLOBULIN SER CALC-MCNC: 3.5 G/DL (ref 1.9–3.5)
GLUCOSE SERPL-MCNC: 95 MG/DL (ref 65–99)
GLUCOSE UR STRIP.AUTO-MCNC: NEGATIVE MG/DL
HCT VFR BLD AUTO: 43 % (ref 37–47)
HGB BLD-MCNC: 14.8 G/DL (ref 12–16)
IMM GRANULOCYTES # BLD AUTO: 0.03 K/UL (ref 0–0.11)
IMM GRANULOCYTES NFR BLD AUTO: 0.3 % (ref 0–0.9)
KETONES UR STRIP.AUTO-MCNC: ABNORMAL MG/DL
LEUKOCYTE ESTERASE UR QL STRIP.AUTO: NEGATIVE
LYMPHOCYTES # BLD AUTO: 3.2 K/UL (ref 1–4.8)
LYMPHOCYTES NFR BLD: 31.9 % (ref 22–41)
MCH RBC QN AUTO: 32 PG (ref 27–33)
MCHC RBC AUTO-ENTMCNC: 34.4 G/DL (ref 33.6–35)
MCV RBC AUTO: 92.9 FL (ref 81.4–97.8)
MICRO URNS: ABNORMAL
MONOCYTES # BLD AUTO: 0.44 K/UL (ref 0–0.85)
MONOCYTES NFR BLD AUTO: 4.4 % (ref 0–13.4)
NEUTROPHILS # BLD AUTO: 6.18 K/UL (ref 2–7.15)
NEUTROPHILS NFR BLD: 61.6 % (ref 44–72)
NITRITE UR QL STRIP.AUTO: NEGATIVE
NRBC # BLD AUTO: 0 K/UL
NRBC BLD-RTO: 0 /100 WBC
PH UR STRIP.AUTO: 6 [PH]
PLATELET # BLD AUTO: 252 K/UL (ref 164–446)
PMV BLD AUTO: 11.9 FL (ref 9–12.9)
POTASSIUM SERPL-SCNC: 3.9 MMOL/L (ref 3.6–5.5)
PROT SERPL-MCNC: 7.7 G/DL (ref 6–8.2)
PROT UR QL STRIP: NEGATIVE MG/DL
RBC # BLD AUTO: 4.63 M/UL (ref 4.2–5.4)
RBC # URNS HPF: ABNORMAL /HPF
RBC UR QL AUTO: ABNORMAL
SODIUM SERPL-SCNC: 134 MMOL/L (ref 135–145)
SP GR UR STRIP.AUTO: 1.03
UROBILINOGEN UR STRIP.AUTO-MCNC: 0.2 MG/DL
WBC # BLD AUTO: 10 K/UL (ref 4.8–10.8)
WBC #/AREA URNS HPF: ABNORMAL /HPF

## 2018-05-09 PROCEDURE — 36415 COLL VENOUS BLD VENIPUNCTURE: CPT

## 2018-05-09 PROCEDURE — 81001 URINALYSIS AUTO W/SCOPE: CPT

## 2018-05-09 PROCEDURE — 85025 COMPLETE CBC W/AUTO DIFF WBC: CPT

## 2018-05-09 PROCEDURE — 84702 CHORIONIC GONADOTROPIN TEST: CPT

## 2018-05-09 PROCEDURE — 99284 EMERGENCY DEPT VISIT MOD MDM: CPT

## 2018-05-09 PROCEDURE — 80053 COMPREHEN METABOLIC PANEL: CPT

## 2018-05-09 PROCEDURE — 76817 TRANSVAGINAL US OBSTETRIC: CPT

## 2018-05-09 RX ORDER — NITROFURANTOIN 25; 75 MG/1; MG/1
100 CAPSULE ORAL 2 TIMES DAILY
Qty: 10 CAP | Refills: 0 | Status: SHIPPED | OUTPATIENT
Start: 2018-05-09 | End: 2018-05-14

## 2018-05-09 ASSESSMENT — ENCOUNTER SYMPTOMS
HEADACHES: 1
FEVER: 0
ABDOMINAL PAIN: 1
BACK PAIN: 1
POLYDIPSIA: 1

## 2018-05-09 NOTE — TELEPHONE ENCOUNTER
While reviewing patient's chart, I noticed pt was seen at the ER today. Patient had an U/S done at the ER.  read the U/S report and indicated patient may be scheduled for NOB instead of a .    Spoke with patient, she indicted she was still spotting only with urination, she stated she was told she had a UTI. I reviewed Dr. Shaw recommendations and patient agreed. She indicated she felt comfortable not getting another scan. She also stated she would call us or go to ER if symtoms worsen.     Patient was transferred to [facility] to cancel appointment on 5/11/2018 and now schedule a NOB instead of a .

## 2018-05-09 NOTE — DISCHARGE INSTRUCTIONS
Abdominal Pain During Pregnancy  Belly (abdominal) pain is common during pregnancy. Most of the time, it is not a serious problem. Other times, it can be a sign that something is wrong with the pregnancy. Always tell your doctor if you have belly pain.  Follow these instructions at home:  Monitor your belly pain for any changes. The following actions may help you feel better:  · Do not have sex (intercourse) or put anything in your vagina until you feel better.  · Rest until your pain stops.  · Drink clear fluids if you feel sick to your stomach (nauseous). Do not eat solid food until you feel better.  · Only take medicine as told by your doctor.  · Keep all doctor visits as told.  Get help right away if:  · You are bleeding, leaking fluid, or pieces of tissue come out of your vagina.  · You have more pain or cramping.  · You keep throwing up (vomiting).  · You have pain when you pee (urinate) or have blood in your pee.  · You have a fever.  · You do not feel your baby moving as much.  · You feel very weak or feel like passing out.  · You have trouble breathing, with or without belly pain.  · You have a very bad headache and belly pain.  · You have fluid leaking from your vagina and belly pain.  · You keep having watery poop (diarrhea).  · Your belly pain does not go away after resting, or the pain gets worse.  This information is not intended to replace advice given to you by your health care provider. Make sure you discuss any questions you have with your health care provider.  Document Released: 12/06/2010 Document Revised: 07/26/2017 Document Reviewed: 07/17/2014  [employer] Interactive Patient Education © 2017 [employer]      Urinary Tract Infection, Adult  Introduction  A urinary tract infection (UTI) is an infection of any part of the urinary tract. The urinary tract includes the:  · Kidneys.  · Ureters.  · Bladder.  · Urethra.  These organs make, store, and get rid of pee (urine) in the body.  Follow these  instructions at home:  · Take over-the-counter and prescription medicines only as told by your doctor.  · If you were prescribed an antibiotic medicine, take it as told by your doctor. Do not stop taking the antibiotic even if you start to feel better.  · Avoid the following drinks:  ¨ Alcohol.  ¨ Caffeine.  ¨ Tea.  ¨ Carbonated drinks.  · Drink enough fluid to keep your pee clear or pale yellow.  · Keep all follow-up visits as told by your doctor. This is important.  · Make sure to:  ¨ Empty your bladder often and completely. Do not to hold pee for long periods of time.  ¨ Empty your bladder before and after sex.  ¨ Wipe from front to back after a bowel movement if you are female. Use each tissue one time when you wipe.  Contact a doctor if:  · You have back pain.  · You have a fever.  · You feel sick to your stomach (nauseous).  · You throw up (vomit).  · Your symptoms do not get better after 3 days.  · Your symptoms go away and then come back.  Get help right away if:  · You have very bad back pain.  · You have very bad lower belly (abdominal) pain.  · You are throwing up and cannot keep down any medicines or water.  This information is not intended to replace advice given to you by your health care provider. Make sure you discuss any questions you have with your health care provider.  Document Released: 06/05/2009 Document Revised: 05/25/2017 Document Reviewed: 11/07/2016  © 2017 [employer]

## 2018-05-09 NOTE — ED TRIAGE NOTES
Ambulates to triage  Chief Complaint   Patient presents with   • Vaginal Bleeding     spotting since yesterday   • Pregnancy     + test April 7     Pt had a miscarriage Jan 25, but said she and bleeding every time she goes to the bathroom.  Pt said she hasn't had a period since her miscarriage in Jan, but had a positive pregnancy test on April 7th.

## 2018-05-09 NOTE — ED PROVIDER NOTES
"ED Provider Note    Scribed for Gaye Patel D.O. by Jonathan Castro. 2018, 9:38 AM.    Primary care provider: Pcp Pt States None  Means of arrival: Walk in  History obtained from: Patient  History limited by: None    CHIEF COMPLAINT  Chief Complaint   Patient presents with   • Vaginal Bleeding     spotting since yesterday   • Pregnancy     + test        HPI  Annabel Bethea is a 37 y.o.  female who presents to the Emergency Department complaining of vaginal spotting which has been consistent since having a miscarriage in 2018. Patient states she typically has blood each time she wipes. She inconsistently has some dripping of blood. Patient had a positive home pregnancy test in 2018 which was confirmed at the pregnancy center that month. However, she continued to spot. Patient reports associated generalized weakness, headache, polydipsia, hot and cold sweats, bilateral calf tightness, pelvic \"heaviness,\" and cramping back pain that radiates to her abdomen. She denies any recent recorded fevers. Patient last normal period was 2017. She notes having normal regular periods on the 15th of every month. She states her periods were \"weird\" starting in 2017. She denies history of STDs. She has never had spotting in the past. She notes history of gestational diabetes with all 6 of her children. Patient has history of stroke in  in which she experienced facial paresis, left sided weakness, facial drooping, and blurred eyesight. Patient has a history of back surgery in 2017. She has a history of cholecystectomy.      REVIEW OF SYSTEMS  Review of Systems   Constitutional: Negative for fever.        Positive hot and cold sweats   Gastrointestinal: Positive for abdominal pain.   Genitourinary:        Positive vaginal spotting, pelvic \"heaviness\"   Musculoskeletal: Positive for back pain.        Positive bilateral calf \"tightness\"   Neurological: Positive for " "headaches.        Positive generalized weakness   Endo/Heme/Allergies: Positive for polydipsia.   All other systems reviewed and are negative.  C    PAST MEDICAL HISTORY   has a past medical history of Allergy; Anxiety (2010); Diabetes (HCC); and Stroke (HCC).    SURGICAL HISTORY   has a past surgical history that includes cholecystectomy and lumbar laminectomy diskectomy (Left, 7/21/2017).    SOCIAL HISTORY  Social History   Substance Use Topics   • Smoking status: Former Smoker     Packs/day: 0.25     Types: Cigarettes     Quit date: 10/1/2016   • Smokeless tobacco: Former User     Quit date: 9/28/2016      Comment: Pt. states last smoked when she found out she was pregnant.    • Alcohol use No      History   Drug Use No       FAMILY HISTORY  Family History   Problem Relation Age of Onset   • Diabetes Paternal Grandmother    • Cancer Paternal Grandmother      breast   • Hypertension Paternal Grandmother    • Stroke Paternal Grandmother    • Other Paternal Grandmother      gout   • Arthritis Paternal Grandmother        CURRENT MEDICATIONS  No current facility-administered medications on file prior to encounter.      Current Outpatient Prescriptions on File Prior to Encounter   Medication Sig Dispense Refill   • ibuprofen (MOTRIN) 600 MG Tab Take 1 Tab by mouth every 6 hours as needed. 30 Tab 0   • lidocaine (LIDODERM) 5 % Patch Apply 1 Patch to skin as directed every 24 hours. 10 Patch 0   • gabapentin (NEURONTIN) 600 MG tablet Take 600 mg by mouth 3 times a day.     • hydrocodone/acetaminophen (NORCO)  MG Tab Take 1-2 Tabs by mouth every 6 hours as needed.        ALLERGIES  Allergies   Allergen Reactions   • Peanut (Diagnostic) Itching     Itchy throat       PHYSICAL EXAM  VITAL SIGNS: /100   Pulse 99   Temp 36.7 °C (98 °F)   Resp 20   Ht 1.702 m (5' 7\")   Wt 99.1 kg (218 lb 7.6 oz)   LMP 08/15/2016   SpO2 99%   Breastfeeding? Yes   BMI 34.22 kg/m²   Vitals reviewed.  Constitutional: Patient " is oriented to person, place, and time. Appears well-developed and well-nourished. No distress.    Head: Normocephalic and atraumatic.   Ears: Normal external ears bilaterally.   Mouth/Throat: Oropharynx is clear and moist  Eyes: Conjunctivae are normal.  Cardiovascular: Normal rate, regular rhythm and normal heart sounds.   Pulmonary/Chest: Effort normal and breath sounds normal. No respiratory distress, no wheezes, rhonchi, or rales.   Abdominal: Soft. Bowel sounds are normal. There is mild lower tenderness, rebound or guarding, or peritoneal signs. No CVA tenderness.  Musculoskeletal: No edema and no tenderness.    Neurological: No focal deficits.   Skin: Skin is warm and dry. No erythema. No pallor.   Psychiatric: Patient has a normal mood and affect.     LABS  Results for orders placed or performed during the hospital encounter of 05/09/18   CBC WITH DIFFERENTIAL   Result Value Ref Range    WBC 10.0 4.8 - 10.8 K/uL    RBC 4.63 4.20 - 5.40 M/uL    Hemoglobin 14.8 12.0 - 16.0 g/dL    Hematocrit 43.0 37.0 - 47.0 %    MCV 92.9 81.4 - 97.8 fL    MCH 32.0 27.0 - 33.0 pg    MCHC 34.4 33.6 - 35.0 g/dL    RDW 45.6 35.9 - 50.0 fL    Platelet Count 252 164 - 446 K/uL    MPV 11.9 9.0 - 12.9 fL    Neutrophils-Polys 61.60 44.00 - 72.00 %    Lymphocytes 31.90 22.00 - 41.00 %    Monocytes 4.40 0.00 - 13.40 %    Eosinophils 1.40 0.00 - 6.90 %    Basophils 0.40 0.00 - 1.80 %    Immature Granulocytes 0.30 0.00 - 0.90 %    Nucleated RBC 0.00 /100 WBC    Neutrophils (Absolute) 6.18 2.00 - 7.15 K/uL    Lymphs (Absolute) 3.20 1.00 - 4.80 K/uL    Monos (Absolute) 0.44 0.00 - 0.85 K/uL    Eos (Absolute) 0.14 0.00 - 0.51 K/uL    Baso (Absolute) 0.04 0.00 - 0.12 K/uL    Immature Granulocytes (abs) 0.03 0.00 - 0.11 K/uL    NRBC (Absolute) 0.00 K/uL   HCG QUANTITATIVE SERUM   Result Value Ref Range    Bhcg 710870.9 (H) 0.0 - 5.0 mIU/mL   CMP   Result Value Ref Range    Sodium 134 (L) 135 - 145 mmol/L    Potassium 3.9 3.6 - 5.5 mmol/L     Chloride 102 96 - 112 mmol/L    Co2 22 20 - 33 mmol/L    Anion Gap 10.0 0.0 - 11.9    Glucose 95 65 - 99 mg/dL    Bun 8 8 - 22 mg/dL    Creatinine 0.57 0.50 - 1.40 mg/dL    Calcium 10.2 8.5 - 10.5 mg/dL    AST(SGOT) 11 (L) 12 - 45 U/L    ALT(SGPT) 9 2 - 50 U/L    Alkaline Phosphatase 51 30 - 99 U/L    Total Bilirubin 0.9 0.1 - 1.5 mg/dL    Albumin 4.2 3.2 - 4.9 g/dL    Total Protein 7.7 6.0 - 8.2 g/dL    Globulin 3.5 1.9 - 3.5 g/dL    A-G Ratio 1.2 g/dL   URINALYSIS   Result Value Ref Range    Color Yellow     Character Clear     Specific Gravity 1.030 <1.035    Ph 6.0 5.0 - 8.0    Glucose Negative Negative mg/dL    Ketones Trace (A) Negative mg/dL    Protein Negative Negative mg/dL    Bilirubin Negative Negative    Urobilinogen, Urine 0.2 Negative    Nitrite Negative Negative    Leukocyte Esterase Negative Negative    Occult Blood Trace (A) Negative    Micro Urine Req Microscopic    URINE MICROSCOPIC (W/UA)   Result Value Ref Range    WBC 0-2 /hpf    RBC 2-5 (A) /hpf    Bacteria Moderate (A) None /hpf    Epithelial Cells Moderate (A) /hpf   ESTIMATED GFR   Result Value Ref Range    GFR If African American >60 >60 mL/min/1.73 m 2    GFR If Non African American >60 >60 mL/min/1.73 m 2      All labs reviewed by me.    RADIOLOGY  US-OB PELVIS TRANSVAGINAL   Final Result      1.  Single intrauterine pregnancy with sonographic age of 9 weeks 3 days and an estimated date of delivery of 12/9/2018   2.  3 cm hypoechoic RIGHT adnexal lesion likely a corpus luteum cyst or hemorrhagic cyst.        The radiologist's interpretation of all radiological studies have been reviewed by me.    COURSE & MEDICAL DECISION MAKING  Pertinent Labs & Imaging studies reviewed. (See chart for details)    Obtained and reviewed past medical records. Patient last encounter was in early February of this year she was seen for low back pain and wrist injury after a ground-level fall. Prior to that, patient was seen in January of this year for  vaginal bleeding. Diagnosed with inevitable .     This is a pleasant 37-year-old female who presents with a history of a positive pregnancy test at home.  She complains of vaginal spotting with lower abdominal pressure for the last several weeks.  Ultrasound, CMP, urinalysis, CBC, HCG quantitative serum to evaluate her symptoms. The differential diagnoses include but are not limited to: electrolyte abnormality, dehydration, anemia, pregnancy, diabetes, threatened ab, UTI    10:36 AM Patient reevaluated at bedside. Updated patient on plan of care.    11:47 AM Patient reevaluated at bedside. Discussed lab and ultrasound results as seen above which shows single intrauterine pregnancy with sonographic age of 9 weeks, 3 days. Patient has some bacteria in her urine. Due to her pregnancy, will prescribe antibiotic medication. The patient will be discharged with instructions regarding supportive care and medications including Macrobid. Instructions were given for follow-up. Discussed indications for seeking immediate medical attention. Patient was given the opportunity for questions. The patient understands and agrees.         The patient will return for new or worsening symptoms and is stable at the time of discharge.    The patient is referred to a primary physician for blood pressure management, diabetic screening, and for all other preventative health concerns.      DISPOSITION:  Patient will be discharged home in stable condition.    FOLLOW UP:  [facility]  [address] [address]  [phone]    In 1 week      [facility], Emergency Dept  [address] [place] [address] [id number] [phone]    If symptoms worsen      OUTPATIENT MEDICATIONS:  Discharge Medication List as of 2018 11:42 AM      START taking these medications    Details   nitrofurantoin monohydr macro (MACROBID) 100 MG Cap Take 1 Cap by mouth 2 times a day for 5 days., Disp-10 Cap, R-0, Print  Rx Paper              FINAL IMPRESSION  1. Pelvic pain    2. Threatened     3. Bacteria in urine    4. 9 weeks gestation of pregnancy          IJonathan (Scribe), am scribing for, and in the presence of, Gaye Patel D.O..    Electronically signed by: Jonathan aCstro (Scribe), 2018    IGaye D.O. personally performed the services described in this documentation, as scribed by Jonathan Castro in my presence, and it is both accurate and complete.    The note accurately reflects work and decisions made by me.  Gaye Patel  2018  12:44 PM

## 2018-06-18 ENCOUNTER — INITIAL PRENATAL (OUTPATIENT)
Dept: OBGYN | Facility: CLINIC | Age: 38
End: 2018-06-18
Payer: MEDICAID

## 2018-06-18 VITALS — DIASTOLIC BLOOD PRESSURE: 68 MMHG | WEIGHT: 225 LBS | SYSTOLIC BLOOD PRESSURE: 118 MMHG | BODY MASS INDEX: 35.24 KG/M2

## 2018-06-18 DIAGNOSIS — O09.899 SUPERVISION OF OTHER HIGH RISK PREGNANCY, ANTEPARTUM: Primary | ICD-10-CM

## 2018-06-18 DIAGNOSIS — O09.529 ANTEPARTUM MULTIGRAVIDA OF ADVANCED MATERNAL AGE: ICD-10-CM

## 2018-06-18 LAB
APPEARANCE UR: NORMAL
BILIRUB UR STRIP-MCNC: NORMAL MG/DL
COLOR UR AUTO: NORMAL
GLUCOSE UR STRIP.AUTO-MCNC: NEGATIVE MG/DL
KETONES UR STRIP.AUTO-MCNC: NEGATIVE MG/DL
LEUKOCYTE ESTERASE UR QL STRIP.AUTO: NEGATIVE
NITRITE UR QL STRIP.AUTO: NEGATIVE
PH UR STRIP.AUTO: 5.5 [PH] (ref 5–8)
PROT UR QL STRIP: NEGATIVE MG/DL
RBC UR QL AUTO: NORMAL
SP GR UR STRIP.AUTO: 1.03
UROBILINOGEN UR STRIP-MCNC: NORMAL MG/DL

## 2018-06-18 PROCEDURE — 81002 URINALYSIS NONAUTO W/O SCOPE: CPT | Performed by: NURSE PRACTITIONER

## 2018-06-18 PROCEDURE — 59402 PR NEW OB HIGH RISK: CPT | Performed by: NURSE PRACTITIONER

## 2018-06-18 NOTE — PROGRESS NOTES
Pt. Here for NOB visit today.  #[phone]  First prenatal care  Pt. States feeling exhausted.  Pharmacy verified  Pt would like AFP  Pt declines CF,consent signed.

## 2018-06-18 NOTE — PROGRESS NOTES
Subjective:      Annabel Bethea is a 37 y.o. female who presents with No chief complaint on file.            HPI    ROS       Objective:     /68   Wt 102.1 kg (225 lb)   LMP 07/12/2017   Breastfeeding? Unknown   BMI 35.24 kg/m²      Physical Exam   Constitutional: She is oriented to person, place, and time. She appears well-developed and well-nourished.   HENT:   Head: Normocephalic.   Eyes: Pupils are equal, round, and reactive to light.   Neck: Normal range of motion. No thyromegaly present.   Cardiovascular: Normal rate, regular rhythm and normal heart sounds.    Pulmonary/Chest: Effort normal and breath sounds normal.   Abdominal: Soft. Bowel sounds are normal.   Genitourinary: Vagina normal and uterus normal.   Musculoskeletal: Normal range of motion.   Neurological: She is oriented to person, place, and time.   Skin: Skin is warm and dry.   Psychiatric: She has a normal mood and affect. Her behavior is normal. Judgment and thought content normal.   Nursing note and vitals reviewed.              Assessment/Plan:     1. Supervision of other high risk pregnancy, antepartum    - PREG CNTR PRENATAL PN; Future  - AFP TETRA; Future  - CHLAMYDIA/GC PCR URINE OR SWAB; Future  - GLUCOSE 1HR GESTATIONAL; Future  - REFERRAL TO PERINATOLOGY

## 2018-06-18 NOTE — LETTER
Cystic Fibrosis Carrier Testing  Annabel Bethea    The following information is about a blood test that can be done to determine if you and/or your partner carry the gene for cystic fibrosis.    WHAT IS CYSTIC FIBROSIS?  · Cystic fibrosis (CF) is an inherited disease that affects more than 25,000 American children and young adults.  · Symptoms of CF vary but include lung congestion, pneumonia, diarrhea and poor growth.  Most people with CF have severe medical problems and some die at a young age.  Others have so few symptoms they are unaware they have CF.  · CF does not affect intelligence.  · Although there is no cure for CF at this time, scientists are making progress in improving treatment and in searching for a cure.  In the past many people with CF  at a very young age.  Today, many are living into their 20’s and 30’s.    IS THERE A CHANCE MY BABY COULD HAVE CYSTIC FIBROSIS?  · You can have a child with CF even if there is no history in your family (see chart below).  · CF testing can help determine if you are a carrier and at risk to have a child with CF.  Note: if both parents are carriers, there is a 1 in 4 (25%) chance with each pregnancy that they will have a child with CF.  · Carriers have one normal CF gene and one altered CF gene.  · People with CF have two altered CF genes.  · Most people have two normal copies of the CF gene.    Approximate risk that a couple with no family history of cystic fibrosis will have a child with cystic fibrosis:    Ethnic background / Risk     couple:  1 in 2,500   couple:  1 in 15,000            couple:  1 in 8,000     American couple:  1 in 32,000     WHAT TESTING IS AVAILABLE?  · There is a blood test that can be done to find out if you or your partner is a carrier.  · It is important to understand that CF carrier testing does not detect all CF carriers.  · If the test shows that you are both CF carriers, you unborn baby  can be tested to find out if the baby has CF.    HOW MUCH DOES IT COST TO HAVE CYSTIC FIBROSIS CARRIER TESTING?  · Cost and insurance coverage for CF carrier testing vary depending upon the laboratory used and your insurance policy.  · The average cost for CF carrier testing is $300 per person.  · Your genetic counselor can provide you with more information about cystic fibrosis carrier testing.    _____  Yes, I am interested in discussing carrier testing with a genetic counselor.    _____  No, I am not interested in CF carrier testing or in receiving more information about CF carrier testing.      Client signature: ________________________________________  6/18/2018

## 2018-06-18 NOTE — PROGRESS NOTES
S:  Annabel Bethea is a 37 y.o.  who presents for her new OB exam.  She is 15w1d with and EDVIN of Estimated Date of Delivery: 18. By ER ultrasound for which she presented for spotting and UTI. She has no complaints.  She is not currently working. No heavy lifting or chemical exposure. L4 L5 surgery by [facility] in 2017. Patient has hx of insulin managed GDM. States had a stroke in  however she is unable to tell her who her care provider is or what hospital she was treated at. She states somewhere in [place] in a dr office and was told to take asprin. She states she had facial numbness and drooping with left sided weakness. When I review [facility] H&P scanned into medical record they do not mention her personal history of stroke. She is no longer on any medications since back surgery, no neurontin or narcotic.      desires AFP.  declines CF.  Denies VB, LOF, or cramping.  Denies dysuria, vaginal DC. Reports possible fetal movement.     Pt is  and lives with FOB.  Pregnancy is desired.      Past Medical History:   Diagnosis Date   • Allergy    • Anxiety    • Depression    • Diabetes (HCC)     GDM last two pregnancy's   • Stroke (HCC)     was told by md that she had a mild stroke x 1 in      Family History   Problem Relation Age of Onset   • Diabetes Paternal Grandmother    • Cancer Paternal Grandmother      breast   • Hypertension Paternal Grandmother    • Stroke Paternal Grandmother    • Other Paternal Grandmother      gout   • Arthritis Paternal Grandmother      Social History     Social History   • Marital status:      Spouse name: N/A   • Number of children: N/A   • Years of education: N/A     Occupational History   • Not on file.     Social History Main Topics   • Smoking status: Former Smoker     Packs/day: 0.25     Types: Cigarettes     Quit date: 10/1/2016   • Smokeless tobacco: Former User     Quit date: 2016      Comment: Pt. states last smoked when she  found out she was pregnant.    • Alcohol use No   • Drug use: Unknown   • Sexual activity: Yes     Partners: Male      Comment: None     Other Topics Concern   • Not on file     Social History Narrative   • No narrative on file     OB History    Para Term  AB Living   8 6 6   1 6   SAB TAB Ectopic Molar Multiple Live Births   1       1 6      # Outcome Date GA Lbr Alberto/2nd Weight Sex Delivery Anes PTL Lv   8A             8B Current            7 2018 9w0d    SAB      6 Term 05/15/17 39w0d  3.175 kg (7 lb) F Vag-Spont  N SILVERIO      Birth Comments: [facility]   5 Term 06/03/10 40w0d  3.629 kg (8 lb) F Vag-Spont EPI N SILVERIO      Birth Comments: GDM A2, had anxiety.   4 Term 09 40w0d  3.629 kg (8 lb) F Vag-Spont EPI N SILVERIO      Birth Comments: Pt states had a high risk pregnancy, GDM A2   3 Term 05 41w0d   M Vag-Spont EPI N SILVERIO      Birth Comments: Pt states was induced. Pt. states son is disabled.    2 Term 04 40w0d  3.629 kg (8 lb) M Vag-Spont EPI N SILVERIO      Birth Comments: Pt states no complications.    1 Term 08/15/97 40w0d  3.629 kg (8 lb) F Vag-Spont EPI N SILVERIO      Birth Comments: Pt states no complications.          History of HSV I or II in self or partner: no  History of Thyroid problems: no    O:    Vitals:    18 1359   BP: 118/68   Weight: 102.1 kg (225 lb)      See Prenatal Physical.    Wet mount: none    Narrative       2018 9:44 AM    HISTORY/REASON FOR EXAM:  Vaginal bleeding; pregnancy test positive for 2518; no contemporaneous pregnancy test results available.      TECHNIQUE/EXAM DESCRIPTION: Real-time OB transvaginal pelvis ultrasound was performed with grey-scale, color and M-mode Doppler imaging.    COMPARISON:  2018    FINDINGS:  Single intrauterine gestation is identified with a heart rate of 165 bpm.  No tierra-gestational hemorrhage is seen.  Crown rump length is 2.61 cm, consistent with a sonographic age of 9 weeks, 3 days. This corresponds to an  estimated date of delivery of 12/9/2018.    There is a 3 cm hypoechoic RIGHT adnexal lesion. This has low level internal echoes. The ovaries are otherwise within normal limits.  There is no evidence of free fluid.     Impression       1.  Single intrauterine pregnancy with sonographic age of 9 weeks 3 days and an estimated date of delivery of 12/9/2018  2.  3 cm hypoechoic RIGHT adnexal lesion likely a corpus luteum cyst or hemorrhagic cyst.   Reading Provider Reading Date         A:   1.  IUP @ 15w1d per         2.  S=D        3.  See problem list below        4.  Questionable history of stroke       Patient Active Problem List    Diagnosis Date Noted   • Degeneration of lumbar intervertebral disc 07/21/2017   • Insulin controlled gestational diabetes mellitus (GDM) in third trimester 05/04/2017   • Non-compliant patient 05/04/2017   • Chronic low back pain with sciatica 04/20/2017   • Supervision of high risk pregnancy, antepartum 11/11/2016   • History of gestational diabetes in prior pregnancy, currently pregnant 11/11/2016         P:  1.  GC/CT done. Last pap on file as normal        2.  Prenatal labs ordered - lab slip given including AFP and early glucose testing.        3.  Discussed PNV, diet, avoidances and adequate water intake        4.  NOB packet given        5.  Return to office in 4 wks        6.  Complete OB US to be done at Dr. Aragon's office. Patient wants referral for AMA status.         7.  I am really unable to reconcile this history of possible stroke. She is not able to give me details or who the care provider is. Seems somewhat unlikely as [facility] did not mention and an actual stroke would require intensive and ongoing management.     No orders of the defined types were placed in this encounter.

## 2018-06-21 NOTE — PROGRESS NOTES
Referral faxed to DR. Fernandez on 6/21/18.  They will contact patient to schedule appt.  Please check with patient if appt was made/ document. Thank you

## 2018-07-16 ENCOUNTER — ROUTINE PRENATAL (OUTPATIENT)
Dept: OBGYN | Facility: CLINIC | Age: 38
End: 2018-07-16
Payer: MEDICAID

## 2018-07-16 VITALS — DIASTOLIC BLOOD PRESSURE: 60 MMHG | BODY MASS INDEX: 36.18 KG/M2 | WEIGHT: 231 LBS | SYSTOLIC BLOOD PRESSURE: 102 MMHG

## 2018-07-16 DIAGNOSIS — O09.90 SUPERVISION OF HIGH RISK PREGNANCY, ANTEPARTUM: Primary | ICD-10-CM

## 2018-07-16 PROCEDURE — 90040 PR PRENATAL FOLLOW UP: CPT | Performed by: NURSE PRACTITIONER

## 2018-07-16 NOTE — PATIENT INSTRUCTIONS
P:  1.  Reviewed labs w pt. Encouraged pt to complete labs.         2.  Pending AFP.        3.  US is tmw w/Oki.        4.  Questions answered.        5.  Encouraged adequate water intake.        6.  F/u 4 wks.        7.  GCCT redone.

## 2018-07-16 NOTE — PROGRESS NOTES
S: Pt is  at 19w1d here for routine OB follow up.  No c/o today.  Reports no FM.  Denies VB, LOF,  RUCs or vaginal DC.     O:  Please see above vitals        FHTs: 149        Fundal ht: 19 cm     A: IUP 19w1d  Patient Active Problem List    Diagnosis Date Noted   • Degeneration of lumbar intervertebral disc 2017   • Insulin controlled gestational diabetes mellitus (GDM) in third trimester 2017   • Non-compliant patient 2017   • Chronic low back pain with sciatica 2017   • Supervision of high risk pregnancy, antepartum 2016   • History of gestational diabetes in prior pregnancy, currently pregnant 2016       P:  1.  Reviewed labs w pt. Encouraged pt to complete labs.         2.  Pending AFP.        3.  US is tmw w/Oki.        4.  Questions answered.        5.  Encouraged adequate water intake.        6.  F/u 4 wks.        7.  GCCT redone.

## 2018-07-16 NOTE — PROGRESS NOTES
Pt here today for OB follow up  Pt states no complaints  Reports -FM  Good # [phone]  Pharmacy Confirmed.  Re-collect GC/CT today    Labs were done today, but go back tomorrow for 1 hr GTT  Dr. Aragon appt tomorrow at 10:30

## 2018-07-17 LAB
C TRACH DNA SPEC QL NAA+PROBE: NEGATIVE
N GONORRHOEA DNA SPEC QL NAA+PROBE: NORMAL

## 2018-07-19 ENCOUNTER — TELEPHONE (OUTPATIENT)
Dept: OBGYN | Facility: CLINIC | Age: 38
End: 2018-07-19

## 2018-07-19 ENCOUNTER — DATING (OUTPATIENT)
Dept: OBGYN | Facility: CLINIC | Age: 38
End: 2018-07-19

## 2018-07-19 DIAGNOSIS — M51.36 DEGENERATION OF LUMBAR INTERVERTEBRAL DISC: ICD-10-CM

## 2018-07-19 DIAGNOSIS — Z86.73 HX OF STROKE WITHOUT RESIDUAL DEFICITS: ICD-10-CM

## 2018-07-19 DIAGNOSIS — O09.92 HIGH RISK FOR INTRAPARTUM COMPLICATIONS IN SECOND TRIMESTER: ICD-10-CM

## 2018-07-19 NOTE — TELEPHONE ENCOUNTER
Patient on the phone asking about Dr Aragon's report and need for referral to neurology and that she needs to be put on aspirin but not sure what dose. I looked for report and was not found. Call Merry at Dr Aragon's office and they will fax it.

## 2018-07-20 LAB — GLUCOSE 1H P 50 G GLC PO SERPL-MCNC: NORMAL MG/DL

## 2018-07-26 ENCOUNTER — TELEPHONE (OUTPATIENT)
Dept: OBGYN | Facility: CLINIC | Age: 38
End: 2018-07-26

## 2018-07-26 NOTE — TELEPHONE ENCOUNTER
Pt  Wanted to know how much asa to take per Dr. Aragon. Per Margo report pt is to take 81 mg baby asa daily. Referral was sent for Neurology consult.   Left message for pt to call back.

## 2018-08-01 ENCOUNTER — TELEPHONE (OUTPATIENT)
Dept: OBGYN | Facility: CLINIC | Age: 38
End: 2018-08-01

## 2018-08-01 DIAGNOSIS — R73.09 ELEVATED GLUCOSE: ICD-10-CM

## 2018-08-01 NOTE — TELEPHONE ENCOUNTER
Patient needs 3 GTT. See media  8/1/18@11:45 am. Patient notified about abnormal 1 GTT and need for 3 GTT, instructions for test given to patient: fasting from 10:00pm night before test ( plain water is ok), call laboratory to schedule appt. Bring something to eat for after test is done. Aware she needs to stay there for the 3 hrs. she agrees to do it ASAP.  Patient will  order to go to [employer]. Orders placed in  basket.

## 2018-08-13 ENCOUNTER — ROUTINE PRENATAL (OUTPATIENT)
Dept: OBGYN | Facility: CLINIC | Age: 38
End: 2018-08-13
Payer: MEDICAID

## 2018-08-13 VITALS — SYSTOLIC BLOOD PRESSURE: 110 MMHG | DIASTOLIC BLOOD PRESSURE: 68 MMHG | BODY MASS INDEX: 36.96 KG/M2 | WEIGHT: 236 LBS

## 2018-08-13 DIAGNOSIS — Z86.73 HX OF STROKE WITHOUT RESIDUAL DEFICITS: ICD-10-CM

## 2018-08-13 PROCEDURE — 90040 PR PRENATAL FOLLOW UP: CPT | Performed by: NURSE PRACTITIONER

## 2018-08-13 NOTE — PROGRESS NOTES
Pt here today for OB follow up  Reports +FM  WT: 236 lb  BP: 110/68  Pt states no complaints today   Pt states has appt with Neuro 08/24/8/18   Pt aware she needs to get her 3 hr gtt done. Pt given lab slip with instructions today.   Good # [phone]

## 2018-08-13 NOTE — PROGRESS NOTES
SUBJECTIVE:  Pt is a 38 y.o.   at 23w1d  gestation. Presents today for follow-up prenatal care. Reports no issues at this time. States did thrombophilia labs through Dr. Aragon's office.  Reports good  fetal movement. Denies cramping/contractions, bleeding or leaking of fluid. Denies dysuria, headaches, N/V, or other issues at this time. Generally feels well today.     OBJECTIVE:  - See prenatal vitals flow  -   Vitals:    18 0959   BP: 110/68   Weight: 107 kg (236 lb)      Labs - normal prenatal labs. Elevated glucose. Thrombophilia workup pending.     US normal scan consider  echo     ASSESSMENT:   - IUP at 23w1d    - S=D   -   Patient Active Problem List    Diagnosis Date Noted   • Hx of TIA vs stroke  without residual deficits 2018   • Degeneration of lumbar intervertebral disc 2017   • Insulin controlled gestational diabetes mellitus (GDM) in third trimester 2017   • Non-compliant patient 2017   • Chronic low back pain with sciatica 2017   • Supervision of high risk pregnancy, antepartum 2016   • History of gestational diabetes in prior pregnancy, currently pregnant 2016         PLAN:  - S/sx pregnancy and labor warning signs vs general discomforts discussed  - Fetal movements and kick counts reviewed   - Adequate hydration reinforced  - Nutrition/exercise/vitamin education; continued PNV  -  Has neuro appt on   Thrombophilia labs done requested records.

## 2018-08-21 ENCOUNTER — DOCUMENTATION (OUTPATIENT)
Dept: OBGYN | Facility: CLINIC | Age: 38
End: 2018-08-21

## 2018-09-04 ENCOUNTER — ROUTINE PRENATAL (OUTPATIENT)
Dept: OBGYN | Facility: CLINIC | Age: 38
End: 2018-09-04
Payer: MEDICAID

## 2018-09-04 VITALS — BODY MASS INDEX: 37.59 KG/M2 | WEIGHT: 240 LBS | DIASTOLIC BLOOD PRESSURE: 66 MMHG | SYSTOLIC BLOOD PRESSURE: 116 MMHG

## 2018-09-04 DIAGNOSIS — Z86.73 HX OF STROKE WITHOUT RESIDUAL DEFICITS: ICD-10-CM

## 2018-09-04 DIAGNOSIS — O09.90 SUPERVISION OF HIGH RISK PREGNANCY, ANTEPARTUM: Primary | ICD-10-CM

## 2018-09-04 PROCEDURE — 90040 PR PRENATAL FOLLOW UP: CPT | Performed by: NURSE PRACTITIONER

## 2018-09-04 NOTE — PROGRESS NOTES
S) Pt is a 38 y.o.   at 26w2d  gestation. Routine prenatal care today. Pt states went to neurology appt and they turned her away saying they did not have all of her records and would not see her until they had them.  Per pt they have called and told her they now have them and need a referral resent.   Pt reports she will get her 3 hr GTT done this week.  Discussed importance of this test on her pregnancy, especially since she had GDM with insulin requirements in her prior pregnancies.  Discussed risk of LGA and shoulder dystocia, as well as possibiltiy of NICU stay for baby if left untreated.  Fetal movement Normal  Cramping no  VB no  LOF no   Denies dysuria. Generally feels well today. Good self-care activities identified. Denies headaches, swelling, visual changes, or epigastric pain .     O) Blood pressure 116/66, weight 108.9 kg (240 lb), last menstrual period 2017, unknown if currently breastfeeding.        Labs:       PNL:  WNL       GCT: 176, needs 3 hr       AFP: normal       GBS: N/A       Pertinent ultrasound - 18 Dr Aragon-survey WNL, possible Protein S deficiency, referral to neuro           A) IUP at 26w2d       S=D         Patient Active Problem List    Diagnosis Date Noted   • Hx of TIA vs stroke  without residual deficits 2018   • Degeneration of lumbar intervertebral disc 2017   • Insulin controlled gestational diabetes mellitus (GDM) in third trimester 2017   • Non-compliant patient 2017   • Chronic low back pain with sciatica 2017   • Supervision of high risk pregnancy, antepartum 2016   • History of gestational diabetes in prior pregnancy, currently pregnant 2016          SVE: deferred         TDAP: no       FLU: no        BTL: no       : no       C/S Consent: no       IOL or C/S scheduled: no       LAST PAP: 16 negative         P) s/s ptl vs general discomforts. Fetal movements reviewed. General ed and anticipatory guidance.  Nutrition/exercise/vitamin.  3 hr GTT  Referral to Neuro placed  Pt to see MD for future appointments  RTC 2 weeks or PRN

## 2018-09-04 NOTE — PROGRESS NOTES
Pt here today for OB follow up  Reports +FM  WT: 240 lb   BP: 116/66  Pt states she will get her 3 hr gtt lab done this week.  Pt states was not able to bee seen by Neurologist last week due to she didn't have all her records form Dr. Story [phone]  Pt states no complaints today  Good # [phone]

## 2018-09-25 ENCOUNTER — ROUTINE PRENATAL (OUTPATIENT)
Dept: OBGYN | Facility: CLINIC | Age: 38
End: 2018-09-25
Payer: MEDICAID

## 2018-09-25 VITALS — DIASTOLIC BLOOD PRESSURE: 68 MMHG | BODY MASS INDEX: 36.81 KG/M2 | WEIGHT: 235 LBS | SYSTOLIC BLOOD PRESSURE: 118 MMHG

## 2018-09-25 DIAGNOSIS — Z23 IMMUNIZATION DUE: ICD-10-CM

## 2018-09-25 PROCEDURE — 90715 TDAP VACCINE 7 YRS/> IM: CPT | Performed by: OBSTETRICS & GYNECOLOGY

## 2018-09-25 PROCEDURE — 90471 IMMUNIZATION ADMIN: CPT | Performed by: OBSTETRICS & GYNECOLOGY

## 2018-09-25 PROCEDURE — 90040 PR PRENATAL FOLLOW UP: CPT | Performed by: OBSTETRICS & GYNECOLOGY

## 2018-09-25 RX ORDER — ONDANSETRON 4 MG/1
4 TABLET, ORALLY DISINTEGRATING ORAL EVERY 6 HOURS PRN
Qty: 20 TAB | Refills: 0 | Status: ON HOLD | OUTPATIENT
Start: 2018-09-25 | End: 2018-11-30

## 2018-09-25 NOTE — PROGRESS NOTES
Pt. Here for OB/F/U today Kick Count sheet given and explained to pt.   Good FM  Good # [phone]  Pt states having nausea and dizziness.  Pharmacy verified.   Pt states will do 3 HR GTT some time next week.   Tdap vaccine given today, right deltoid. Screening checklist reviewed with pt verified by Emir  BTL consent form signed today.

## 2018-09-25 NOTE — LETTER
"Count Your Baby's Movements  Another step to a healthy delivery    Annabel Bethea              Dept: [phone]    How Many Weeks Pregnant 29w2d    Date to Begin Countin18              How to use this chart    One way for your physician to keep track of your baby's health is by knowing how often the baby moves (or \"kicks\") in your womb.  You can help your physician to do this by using this chart every day.    Every day, you should see how many hours it takes for your baby to move 10 times.  Start in the morning, as soon as you get up.    · First, write down the time your baby moves until you get to 10.  · Check off one box every time your baby moves until you get to 10.  · Write down the time you finished counting in the last column.  · Total how long it took to count up all 10 movements.  · Finally, fill in the box that shows how long this took.  After counting 10 movements, you no longer have to count any more that day.  The next morning, just start counting again as soon as you get up.    What should you call a \"movement\"?  It is hard to say, because it will feel different from one mother to another and from one pregnancy to the next.  The important thing is that you count the movements the same way throughout your pregnancy.  If you have more questions, you should ask your physician.    Count carefully every day!  SAMPLE:  Week 28    How many hours did it take to feel 10 movements?       Start  Time     1     2     3     4     5     6     7     8     9     10   Finish Time   Mon 8:20 ·  ·  ·  ·  ·  ·  ·  ·  ·  ·  11:40                  Sat               Sun                 IMPORTANT: You should contact your physician if it takes more than two hours for you to feel 10 movements.  Each morning, write down the time and start to count the movements of your baby.  Keep track by checking off one box every time you feel one movement.  When you have felt " "10 \"kicks\", write down the time you finished counting in the last column.  Then fill in the   box (over the check gisselle) for the number of hours it took.  Be sure to read the complete instructions on the previous page.            "

## 2018-09-25 NOTE — PROGRESS NOTES
S: Pt presents for routine OB follow up. Good fetal movement.  No contractions, vaginal bleeding, or leakage of fluid.    Questions answered.    Some dizziness, was sick last week with N/V/D    O: /68   Wt 106.6 kg (235 lb)   LMP 2017   BMI 36.81 kg/m²   Patients' weight gain, fluid intake and exercise level discussed.  Vitals, fundal height , fetal position, and FHR reviewed on flowsheet    Lab:No results found for this or any previous visit (from the past 336 hour(s)).    A/P:  38 y.o.  at 29w2d presents for routine obstetric follow-up.  Size equals dates and/or scan    1.  Continue prenatal vitamins.  2.  Fetal kick counts.  3.  Exercise at least 30 minutes daily.  4.  Increase water intake.  5.  Labor precautions educated.  6.  Follow-up in 2 weeks.  7.  GBS at 35 weeks  8.  Dr. Aragon following, on ASA 81  9.  Neuro consult ordered, may need lovenox.  Will d/w Dr. Aragon regarding lovenox in PP period if no anticoagulation during pregnancy  10.  Needs 3 hour GTT  11.  TDaP today, flushot next week  12.  Considering BTL

## 2018-09-26 ENCOUNTER — OFFICE VISIT (OUTPATIENT)
Dept: NEUROLOGY | Facility: MEDICAL CENTER | Age: 38
End: 2018-09-26
Payer: MEDICAID

## 2018-09-26 VITALS
BODY MASS INDEX: 37.35 KG/M2 | HEART RATE: 111 BPM | WEIGHT: 238 LBS | OXYGEN SATURATION: 97 % | SYSTOLIC BLOOD PRESSURE: 116 MMHG | DIASTOLIC BLOOD PRESSURE: 62 MMHG | TEMPERATURE: 97.2 F | HEIGHT: 67 IN

## 2018-09-26 DIAGNOSIS — G43.409 HEMIPLEGIC MIGRAINE WITHOUT STATUS MIGRAINOSUS, NOT INTRACTABLE: ICD-10-CM

## 2018-09-26 PROCEDURE — 99204 OFFICE O/P NEW MOD 45 MIN: CPT | Performed by: PHYSICIAN ASSISTANT

## 2018-09-26 ASSESSMENT — PAIN SCALES - GENERAL: PAINLEVEL: NO PAIN

## 2018-09-26 NOTE — Clinical Note
Dr. Montero - can you review my note and let me and or Dr. Aragon know if you have a different recommendation for this woman?  Thank you , betty

## 2018-09-26 NOTE — PROGRESS NOTES
"Subjective:      Annabel Bethea is a 38 y.o. female who presents with New Patient (Hx of stroke)    Patient is referred her by her high risk pregnancy provider - Dr. Aragon - for concern for pro-thrombotic state and possible TIA or stroke in past.    Patient is 38 years old, currently pregnant with an c section planned for December.  Patient reports that back in 2015 she went to the ER in [place] due to development of difficulty speaking and functioning while doing an online class.  She reports her classmates (they could all see each other visually apparently) noted she was \"not right.\"  When she went to the ER she was discharged after some procedures were performed - she is not sure what - but they did not feel she had a stroke - \"maybe because I was too young at just 35 at time.\"      The next day when her face still felt numb she presented to her primary who apparently told her \"it's not rocket science - you had a stroke.\"    Patient is not sure if she ever had an MRI done or if any blood tests were done back in 2015 regarding a possible hypercoagulable disorder.  She does remember her PCP telling her just to take an aspirin everyday and stop smoking (she denies being on OCP at time of event).  She stopped her aspirin last year when told to do this when she was pregnant at that time.  She had no problems with that pregnancy.    Unfortunately, I do not have any records from this hospital admission or any test results from her PCP at that time.  I do have records from Dr. Aragon including a hypercoag panel with a test result of concern - deficiency in Protein S.    Review of PMH and possible stroke mimics is of concern to me because patient reports history of migraines and in fact states that she does remember the day when all this happened that she did have a migraine that was quite severe and they were not under good control at that time.  She denies any residual deficits from this event in 2015. As patient " "has limited access to healthcare due to insurance status unclear to me if she has only had gestational diabetes or has diabetes not on any medication between pregnancies.            HPI    ROS       Objective:     /62 (BP Location: Right arm, Patient Position: Sitting, BP Cuff Size: Small adult)   Pulse (!) 111   Temp 36.2 °C (97.2 °F) (Temporal)   Ht 1.702 m (5' 7\")   Wt 108 kg (238 lb)   LMP 07/12/2017   SpO2 97%   BMI 37.28 kg/m²      Physical Exam    No observable deficits or any residual deficits from the \"stroke\" in 2015            Assessment/Plan:   Isolated episode of slurred speech and facial tingling in 2015 - hemiplegic migraine vs other stroke mimic vs TIA vs stroke.  In discussion with patient and without any brain imaging I suspect she had a hemiplegic or complicated migraine and not a stroke.  Hopefully some imaging was actually done at the time of the event so this can be confirmed.  Once she has delivered her baby I would recommend MRI brain as evidence of the old stroke will/should be evident if it occurred in 2015.    Abnormal Protein S test result - it appears from Dr. Aragon's note that this can be a false positive during pregnancy.  Again, hopefully some test results from her PCP or the ER back in 2015 will have included a hypercoag workup and we could confirm whether this is a true deficiency or whether it was normal in 2015 when she had this episode while not pregnant.  If she is actually protein S deficient it is controversial whether to treat even a young non-pregnant person UNLESS they actually have had a life threatening event such as stroke or PE.  Once again, we are back to needing an answer on whether this 2015 event was stroke or complicated migraine.      I discussed with patient at length that pregnancy in and of itself puts a woman into a pro-thrombotic state but that at this time I do not have sufficient information to confirm that she actually had a stroke in 2015 and " my gut tells me she probably had a complicated migraine.  I did  her - for quite a lengthy period of time - on signs and symptoms of stroke and need to immediately present to ER should these occur.  Without a confirmation of a life threatening event, I cannot recommend an AC while pregnant because we do not have enough data on this - from a stroke standpoint.  I will defer to the recommendation of Dr. Aragon.     Hypo or severe hyperglycemia can even mimic stroke but as she remembers having a migraine that day and has a hx of migraine, I suspect if this was a stroke mimic it was complicated migraine.    We will continue to try and obtain these records from the ER in [place] and her PCP  Hopefully we will have some results.  In the meantime, I will also run this note past Dr. Montero who is the Director of our Inpatient Stroke and make sure she has no other thoughts on this plan of care.    Total time with this visit: 45    Minutes face-to-face with patient. More than 50% of this visit (virtually all of this visit) was spent educating patient on their illness and/or coordinating care, as detailed above.

## 2018-10-01 ENCOUNTER — HOSPITAL ENCOUNTER (OUTPATIENT)
Facility: MEDICAL CENTER | Age: 38
End: 2018-10-01
Attending: OBSTETRICS & GYNECOLOGY | Admitting: OBSTETRICS & GYNECOLOGY
Payer: MEDICAID

## 2018-10-01 ENCOUNTER — APPOINTMENT (OUTPATIENT)
Dept: RADIOLOGY | Facility: MEDICAL CENTER | Age: 38
End: 2018-10-01
Attending: FAMILY MEDICINE
Payer: MEDICAID

## 2018-10-01 VITALS
SYSTOLIC BLOOD PRESSURE: 131 MMHG | TEMPERATURE: 98.2 F | RESPIRATION RATE: 16 BRPM | HEART RATE: 114 BPM | DIASTOLIC BLOOD PRESSURE: 66 MMHG

## 2018-10-01 LAB
ALBUMIN SERPL BCP-MCNC: 3.5 G/DL (ref 3.2–4.9)
ALBUMIN/GLOB SERPL: 1.2 G/DL
ALP SERPL-CCNC: 76 U/L (ref 30–99)
ALT SERPL-CCNC: 7 U/L (ref 2–50)
ANION GAP SERPL CALC-SCNC: 11 MMOL/L (ref 0–11.9)
APPEARANCE UR: CLEAR
AST SERPL-CCNC: 9 U/L (ref 12–45)
BASOPHILS # BLD AUTO: 0.4 % (ref 0–1.8)
BASOPHILS # BLD: 0.05 K/UL (ref 0–0.12)
BILIRUB SERPL-MCNC: 0.3 MG/DL (ref 0.1–1.5)
BUN SERPL-MCNC: 8 MG/DL (ref 8–22)
CALCIUM SERPL-MCNC: 8.7 MG/DL (ref 8.5–10.5)
CHLORIDE SERPL-SCNC: 104 MMOL/L (ref 96–112)
CO2 SERPL-SCNC: 19 MMOL/L (ref 20–33)
COLOR UR AUTO: ABNORMAL
CREAT SERPL-MCNC: 0.55 MG/DL (ref 0.5–1.4)
EOSINOPHIL # BLD AUTO: 0.26 K/UL (ref 0–0.51)
EOSINOPHIL NFR BLD: 1.9 % (ref 0–6.9)
ERYTHROCYTE [DISTWIDTH] IN BLOOD BY AUTOMATED COUNT: 44.4 FL (ref 35.9–50)
EST. AVERAGE GLUCOSE BLD GHB EST-MCNC: 148 MG/DL
GLOBULIN SER CALC-MCNC: 3 G/DL (ref 1.9–3.5)
GLUCOSE BLD-MCNC: 123 MG/DL (ref 65–99)
GLUCOSE SERPL-MCNC: 120 MG/DL (ref 65–99)
GLUCOSE UR QL STRIP.AUTO: 100 MG/DL
HBA1C MFR BLD: 6.8 % (ref 0–5.6)
HCT VFR BLD AUTO: 36.4 % (ref 37–47)
HGB BLD-MCNC: 12.5 G/DL (ref 12–16)
IMM GRANULOCYTES # BLD AUTO: 0.09 K/UL (ref 0–0.11)
IMM GRANULOCYTES NFR BLD AUTO: 0.7 % (ref 0–0.9)
KETONES UR QL STRIP.AUTO: 15 MG/DL
LEUKOCYTE ESTERASE UR QL STRIP.AUTO: NEGATIVE
LYMPHOCYTES # BLD AUTO: 3.25 K/UL (ref 1–4.8)
LYMPHOCYTES NFR BLD: 24.3 % (ref 22–41)
MCH RBC QN AUTO: 32.1 PG (ref 27–33)
MCHC RBC AUTO-ENTMCNC: 34.3 G/DL (ref 33.6–35)
MCV RBC AUTO: 93.6 FL (ref 81.4–97.8)
MONOCYTES # BLD AUTO: 0.64 K/UL (ref 0–0.85)
MONOCYTES NFR BLD AUTO: 4.8 % (ref 0–13.4)
NEUTROPHILS # BLD AUTO: 9.09 K/UL (ref 2–7.15)
NEUTROPHILS NFR BLD: 67.9 % (ref 44–72)
NITRITE UR QL STRIP.AUTO: NEGATIVE
NRBC # BLD AUTO: 0 K/UL
NRBC BLD-RTO: 0 /100 WBC
PH UR STRIP.AUTO: 6.5 [PH]
PLATELET # BLD AUTO: 221 K/UL (ref 164–446)
PMV BLD AUTO: 11.5 FL (ref 9–12.9)
POTASSIUM SERPL-SCNC: 3.6 MMOL/L (ref 3.6–5.5)
PROT SERPL-MCNC: 6.5 G/DL (ref 6–8.2)
PROT UR QL STRIP: NEGATIVE MG/DL
RBC # BLD AUTO: 3.89 M/UL (ref 4.2–5.4)
RBC UR QL AUTO: ABNORMAL
SODIUM SERPL-SCNC: 134 MMOL/L (ref 135–145)
SP GR UR: 1.02
WBC # BLD AUTO: 13.4 K/UL (ref 4.8–10.8)

## 2018-10-01 PROCEDURE — 80053 COMPREHEN METABOLIC PANEL: CPT

## 2018-10-01 PROCEDURE — 36415 COLL VENOUS BLD VENIPUNCTURE: CPT

## 2018-10-01 PROCEDURE — 99214 OFFICE O/P EST MOD 30 MIN: CPT | Performed by: FAMILY MEDICINE

## 2018-10-01 PROCEDURE — 81002 URINALYSIS NONAUTO W/O SCOPE: CPT

## 2018-10-01 PROCEDURE — 59025 FETAL NON-STRESS TEST: CPT

## 2018-10-01 PROCEDURE — 83036 HEMOGLOBIN GLYCOSYLATED A1C: CPT

## 2018-10-01 PROCEDURE — 85025 COMPLETE CBC W/AUTO DIFF WBC: CPT

## 2018-10-01 PROCEDURE — 82962 GLUCOSE BLOOD TEST: CPT

## 2018-10-01 PROCEDURE — 76815 OB US LIMITED FETUS(S): CPT

## 2018-10-01 NOTE — PROGRESS NOTES
LABOR AND DELIVERY TRIAGE NOTE    PATIENT ID:  NAME:  Annabel Bethea  MRN:               [id number]  YOB: 1980    CC: Dizziness    HPI:  Annabel Bethea is a 38 y.o. female  at 30w1d by a 9 week ultrasound performed on 18 not consistent with LMP on Patient's last menstrual period was 2017.. Estimated Date of Delivery: 18  Patient presents complaining of no uterine contractions, with no loss of fluid.  normal fetal movement.  no vaginal bleeding.  Pregnancy was complicated by advanced maternal age, degenerative disc disease of the lumbar spine, migraines, obesity, grand multiparity, noncompliance with medical care, impaired fasting glucose still requiring 3-hour glucose tolerance test, and possible coagulopathy (protein S deficiency).  Today she presents with dizziness that has since resolved, patient denies any loss of consciousness or falls.    ROS: Patient denies any fever chills, nausea, vomiting, headache, chest pain, shortness of breath, or dysuria or unusual swelling of hands or feet.     Prenatal Care: Obtained at the pregnancy center starting on 2018 with a total of 5 prenatal visits to date.    Prenatal Labs: Patient still has not gotten her labs today and still labs today                   Recent Labs      10/01/18   1411   WBC  13.4*   RBC  3.89*   HEMOGLOBIN  12.5   HEMATOCRIT  36.4*   MCV  93.6   MCH  32.1   RDW  44.4   PLATELETCT  221   MPV  11.5   NEUTSPOLYS  67.90   LYMPHOCYTES  24.30   MONOCYTES  4.80   EOSINOPHILS  1.90   BASOPHILS  0.40     Recent Labs      10/01/18   1411   SODIUM  134*   POTASSIUM  3.6   CHLORIDE  104   CO2  19*   GLUCOSE  120*   BUN  8          IMAGING:   OB ultrasound:   10/1/2018 1:57 PM    HISTORY/REASON FOR EXAM:  Maternal illness complications      TECHNIQUE/EXAM DESCRIPTION: OB limited ultrasound.    COMPARISON:  None    FINDINGS:  Fetal Lie:  Vertex  LMP:  3/4/2018  Clinical EDVIN by LMP:  2018    Placenta (Location):   Anterior  Placenta Previa: No    Amniotic Fluid Volume:  ALPA = 15.6 cm    Fetal Heart Rate:  144 bpm    Cervical Length:  5.24 cm    Fetal Biometry  BPD    7.54 cm, Hadlock 30w 2d  HC    27.95 cm, Hadlock 30w 4d  AC    26.33 cm, Hadlock 30w 3d  Femur Length    5.63 cm, Hadlock 29w 4d  Humerus Length    5.14 cm, Trip 30w 0d    EGA by this US:  Average 30w 1d  EDVIN by this US: 2018  EDVIN by first ultrasound: 2018    Estimated Fetal Weight:  1525 g    Comments:  Limited examination. Fetal survey not done   Impression       Single intrauterine pregnancy of an estimated gestational age of Average 30w 1d with an estimated date of delivery of 2018.         POB Hx:  OB History    Para Term  AB Living   8 6 6   1 6   SAB TAB Ectopic Molar Multiple Live Births   1       1 6      # Outcome Date GA Lbr Alberto/2nd Weight Sex Delivery Anes PTL Lv   8A             8B Current            7 2018 9w0d    SAB      6 Term 05/15/17 39w0d  3.175 kg (7 lb) F Vag-Spont  N SILVERIO      Birth Comments: [facility]   5 Term 06/03/10 40w0d  3.629 kg (8 lb) F Vag-Spont EPI N SILVERIO      Birth Comments: GDM A2, had anxiety.   4 Term 09 40w0d  3.629 kg (8 lb) F Vag-Spont EPI N SILVERIO      Birth Comments: Pt states had a high risk pregnancy, GDM A2   3 Term 05 41w0d   M Vag-Spont EPI N SILVERIO      Birth Comments: Pt states was induced. Pt. states son is disabled.    2 Term 04 40w0d  3.629 kg (8 lb) M Vag-Spont EPI N SILVERIO      Birth Comments: Pt states no complications.    1 Term 08/15/97 40w0d  3.629 kg (8 lb) F Vag-Spont EPI N SILVERIO      Birth Comments: Pt states no complications.          PMH/Problem List:    Past Medical History:   Diagnosis Date   • Allergy    • Anxiety    • Depression    • Diabetes (HCC)     GDM last two pregnancy's   • Hemiplegic migraine without status migrainosus, not intractable 2018   • Stroke (HCC)     was told by md that she had a mild stroke x 1 in      Patient Active  Problem List    Diagnosis Date Noted   • Hemiplegic migraine without status migrainosus, not intractable 09/26/2018   • Hx of TIA vs stroke 2015 without residual deficits 07/19/2018   • Degeneration of lumbar intervertebral disc 07/21/2017   • Insulin controlled gestational diabetes mellitus (GDM) in third trimester 05/04/2017   • Non-compliant patient 05/04/2017   • Chronic low back pain with sciatica 04/20/2017   • Supervision of high risk pregnancy, antepartum 11/11/2016   • History of gestational diabetes in prior pregnancy, currently pregnant 11/11/2016       Current Outpatient Medications:  No current facility-administered medications on file prior to encounter.      Current Outpatient Prescriptions on File Prior to Encounter   Medication Sig Dispense Refill   • ondansetron (ZOFRAN ODT) 4 MG TABLET DISPERSIBLE Take 1 Tab by mouth every 6 hours as needed for Nausea. 20 Tab 0   • aspirin (ASA) 81 MG Chew Tab chewable tablet Take 81 mg by mouth every day.     • Prenatal MV-Min-Fe Fum-FA-DHA (PRENATAL 1 PO) Take  by mouth.         PSH:    Past Surgical History:   Procedure Laterality Date   • LUMBAR LAMINECTOMY DISKECTOMY Left 7/21/2017    Procedure: LUMBAR LAMINECTOMY DISKECTOMY - L4-5 MICRODISCECTOMY;  Surgeon: Neeraj Burrell M.D.;  Location: SURGERY [facility];  Service:    • CHOLECYSTECTOMY         Allergies:   Allergies   Allergen Reactions   • Peanut (Diagnostic) Itching     Itchy throat       SH:  Social History     Social History   • Marital status:      Spouse name: N/A   • Number of children: N/A   • Years of education: N/A     Occupational History   • Not on file.     Social History Main Topics   • Smoking status: Former Smoker     Packs/day: 0.25     Types: Cigarettes     Quit date: 10/1/2016   • Smokeless tobacco: Former User     Quit date: 9/28/2016      Comment: Pt. states last smoked when she found out she was pregnant.    • Alcohol use No   • Drug use: Unknown   • Sexual activity: Yes      Partners: Male      Comment: None     Other Topics Concern   • Not on file     Social History Narrative   • No narrative on file         PHYSICAL EXAM:  Vitals:    10/01/18 1317   BP: 131/66   Pulse: (!) 114     No data recorded.    General: No acute distress, resting comfortably in bed.  HEENT: normocephalic, nontraumatic, PERRLA, EOMI  Cardiovascular: Heart RRR with no murmurs, rubs or gallops. Distal Pulses 2+  Respiratory: symmetric chest expansion, lungs CTA bilaterally with no wheezes rales or  rhonci  Abdomen: gravid, nontender  Musculoskeletal: strength 5/5 in four extremities  Neuro: non focal with no numbness, tingling or changes in sensation    SVE: deferred    A/P: 38-year-old  single intrauterine pregancy at 30w1d weeks here for evaluation of dizziness.    1. Patient noncompliant with laboratory studies currently.  Discussed with patient that she needs to be compliant with her care otherwise she runs the risk of harming this pregnancy.    2.  Ultrasound today reassuring for size and dates, ALPA normal.  3.  Lab unable to complete all studies today prior to patient discharge.  Patient refused additional lab draw.  Extensive counseling about how she needs to follow through with care or have unnecessary risk to this pregnancy.  Glucose present in urine and elevated blood glucose despite being nonfasting concerning for gestational diabetes recurrence.  Patient is adamantly confirming that she will follow up with 3-hour glucose tomorrow as well as getting her initial first trimester labs done.     Kiko Mendieta M.D.     CC:  Dr. Blayne Aragon

## 2018-10-01 NOTE — PROGRESS NOTES
1310-pt presents from home with c/o dizziness, states that she talked to the Dr last week about it and was instructed to drink pedialyte and rest, states that when she was shopping with her children today that she was dizzy again and decided to come in to be checked out, no c/o leaking, bleeding, pain or uc's, states baby is moving normally, placed on external monitors, vs taken, ua dipped with glucose  1320-TC Dr Mendieta, report given, will be in to see pt soon  1340-Dr Mendieta here, assessment done, labs and US ordered  1415-labs drawn, US done,   1450-labs back, reviewed labs and US with Dr Mendieta, discharge order received, pt needs to do 3 hour GTT tomorrow  1455-pt discharged home with PTL precautions and instructions to do 3 hour GTT tomorrow morning, verbalized understanding, left ambulatory with children

## 2018-10-15 ENCOUNTER — ROUTINE PRENATAL (OUTPATIENT)
Dept: OBGYN | Facility: CLINIC | Age: 38
End: 2018-10-15
Payer: MEDICAID

## 2018-10-15 VITALS — WEIGHT: 240 LBS | BODY MASS INDEX: 37.59 KG/M2 | SYSTOLIC BLOOD PRESSURE: 120 MMHG | DIASTOLIC BLOOD PRESSURE: 66 MMHG

## 2018-10-15 DIAGNOSIS — Z86.32 HISTORY OF GESTATIONAL DIABETES IN PRIOR PREGNANCY, CURRENTLY PREGNANT: ICD-10-CM

## 2018-10-15 DIAGNOSIS — O09.299 HISTORY OF GESTATIONAL DIABETES IN PRIOR PREGNANCY, CURRENTLY PREGNANT: ICD-10-CM

## 2018-10-15 PROCEDURE — 90040 PR PRENATAL FOLLOW UP: CPT | Performed by: OBSTETRICS & GYNECOLOGY

## 2018-10-15 NOTE — PROGRESS NOTES
Pt. Here for OB/FU today. Reports Good FM.   Good # [phone]  Pt states still having dizziness.   Pt was seen at [facility] L[facility]D on 10/1/18 for dizziness  Pharmacy verified.   Pt tried to do 3 HR GTT on 10/8/18 but was unable to get done at lab   Next door tech. Cancelled test, pt has Medicaid HMO, pt given a new lab slip today.

## 2018-10-15 NOTE — PROGRESS NOTES
OB Followup;    32w1d    Patient Active Problem List    Diagnosis Date Noted   • Hemiplegic migraine without status migrainosus, not intractable 09/26/2018   • Hx of TIA vs stroke 2015 without residual deficits 07/19/2018   • Degeneration of lumbar intervertebral disc 07/21/2017   • Insulin controlled gestational diabetes mellitus (GDM) in third trimester 05/04/2017   • Non-compliant patient 05/04/2017   • Chronic low back pain with sciatica 04/20/2017   • Supervision of high risk pregnancy, antepartum 11/11/2016   • History of gestational diabetes in prior pregnancy, currently pregnant 11/11/2016       Vitals:    10/15/18 0907   BP: 120/66   Weight: 108.9 kg (240 lb)       Patient presents for followup of OB care. Currently doing well . Good fetal movement no leakage of fluid no contractions or vaginal bleeding        Size equals dates, normal fetal heart rate      Labs; patient given lab slip for 3-hour GTT-patient has still not performed through our testing.  The patient had diabetes with her last pregnancy diagnosed late in the pregnancy.  The patient has hemoglobin A1c of 6.8 which indicates she is definitely at high risk for gestational diabetes.  Patient needs to perform 3-hour glucose tolerance test immediately.  Given another lab slip.  In addition, patient has  seen neurology on 9/26 records not available-neurology has not started Lovenox they are awaiting more records from [place] prior to starting Lovenox.    Need to obtain records from neurologist          Labor precautions given  Increase oral hydration  Discussed proper weight gain during pregnancy  Continue prenatal vitamins  Increase water intake  Reviewed our group's policy on prenatal visits with all providers in the group    Followup in  2 weeks

## 2018-10-30 ENCOUNTER — ROUTINE PRENATAL (OUTPATIENT)
Dept: OBGYN | Facility: CLINIC | Age: 38
End: 2018-10-30
Payer: MEDICAID

## 2018-10-30 VITALS — BODY MASS INDEX: 38.53 KG/M2 | WEIGHT: 246 LBS | DIASTOLIC BLOOD PRESSURE: 64 MMHG | SYSTOLIC BLOOD PRESSURE: 118 MMHG

## 2018-10-30 DIAGNOSIS — O09.93 ENCOUNTER FOR SUPERVISION OF HIGH RISK PREGNANCY IN THIRD TRIMESTER, ANTEPARTUM: Primary | ICD-10-CM

## 2018-10-30 PROCEDURE — 90040 PR PRENATAL FOLLOW UP: CPT | Performed by: NURSE PRACTITIONER

## 2018-10-30 NOTE — PROGRESS NOTES
Pt here today for OB follow up @34w2d  Pt denies leaking fluids or bleeding  Reports +FM  Good # [phone]  Pharmacy Confirmed.

## 2018-10-30 NOTE — PROGRESS NOTES
S:  Pt is  at 34w2d for routine OB follow up.  Has not gotten 3 hr GTT drawn. Consulted with Dr. Sewellin [facility] of hx of GDM, 1 hour , A1c 6.8 S>D, we will manage patient as GDM. She understands she has an appt with diabetic counselor and will be followed up by physician.  Reports good FM.  Denies VB, LOF, RUCs or vaginal DC.    O:  Please see above vitals.        FHTs: 145        Fundal ht: 39 cm.        S>D    A:  IUP at 34w2d  Patient Active Problem List    Diagnosis Date Noted   • Hemiplegic migraine without status migrainosus, not intractable 2018   • Hx of TIA vs stroke  without residual deficits 2018   • Degeneration of lumbar intervertebral disc 2017   • Insulin controlled gestational diabetes mellitus (GDM) in third trimester 2017   • Non-compliant patient 2017   • Chronic low back pain with sciatica 2017   • Supervision of high risk pregnancy, antepartum 2016   • History of gestational diabetes in prior pregnancy, currently pregnant 2016        P:  1.  GBS @ 35 wks.          2.  Continue FKCs.          3.  Questions answered.          4.  Encouraged pt to tour L&D.          5.  Encourage adequate water intake.        6.  F/u 1 wk with physician        7.  Has appt for diabetic class

## 2018-11-01 ENCOUNTER — NON-PROVIDER VISIT (OUTPATIENT)
Dept: OBGYN | Facility: CLINIC | Age: 38
End: 2018-11-01
Payer: MEDICAID

## 2018-11-01 VITALS — WEIGHT: 246.6 LBS | BODY MASS INDEX: 38.71 KG/M2 | HEIGHT: 67 IN

## 2018-11-01 DIAGNOSIS — O24.419 GESTATIONAL DIABETES MELLITUS (GDM) IN THIRD TRIMESTER, GESTATIONAL DIABETES METHOD OF CONTROL UNSPECIFIED: ICD-10-CM

## 2018-11-01 PROCEDURE — 97802 MEDICAL NUTRITION INDIV IN: CPT | Performed by: DIETITIAN, REGISTERED

## 2018-11-01 NOTE — PROGRESS NOTES
Subjective:  Pt has not been able to attend the GDM class and therefore was given one on one GDM education today   Pt has taken the GDM class in her previous pregnancy (April 2017)   Pt was given a One Touch Verio meter requiring Delica Lancets that should be covered by her insurance. Rx was sent in today by Dr. Hansen.   Annabel has GDM in her last 3 pregnancies. Denies taking insulin in any.   Her post prandial glucose today was 155 (2 hours after drinking a milkshake). Had Halloween candy this morning.   States she eats a lot of high carb foods.     Objective:   Anthropometrics:  Today's weight: 246.6lb  Pre-pregnancy weight: 218lb  Total weight gain: +28.6lb  Weeks gestation: 34w4d    Biochemical data, medical test and procedures:  Lab Results   Component Value Date/Time    HBA1C 6.8 (H) 10/01/2018 02:11 PM   @  Lab Results   Component Value Date/Time    POCGLUCOSE 123 (H) 10/01/2018 01:44 PM       OGTT Results: NA       Assessment:   Nutrition Diagnosis (PES Statement):  · Altered nutrition related lab values related to endocrine dysfunction as evidenced by OGTT    Recommended Diet:  2200 calorie meal plan (see media for detailed meal plan)  3 meals and 3 snacks   1 carb choice at breakfast w/ 2oz protein and 1 fat   No concentrated sweets for remainder of pregnancy   No fruit in the morning   No sweetened beverages  No alcohol in pregnancy   Do not go exceed 4 hours during day or >10 hours overnight without eating     Assessment Notes:   Pt demonstrated ability to check FSBS today. She was a little fearful of the lancet needle but agrees to check 4 times a day. Reviewed blood sugar targets and proper use of glucometer. Also discussed dietary guidelines. Pt agrees to reduce carb portion to 1 cup of 1 high carb food at lunch and dinner and will reduce breakfast to 1 slice toast. She will try unsweetened almond milk or reduce intake of cows milk, especially in the morning. Reviewed importance of eating  protein at every meal and snack and reviewed high protein foods. Gave snack ideas list.     Plan: Monitoring & Evaluation  Goals:  1. Follow meal plan as outlined above; 3 meals and 3 snacks daily.   2. Check FSBS 4 times a day (fasting and 1 hour after each meal)  3. FSBS Goals= Fasting <90; 1 hour PP <130   4. Bring blood sugar log book to each appointment   5. Appropriate weight gain during pregnancy       Follow up 1 week   Crys Ge, MARYSOL, LD, CDE  [phone]

## 2018-11-08 ENCOUNTER — ROUTINE PRENATAL (OUTPATIENT)
Dept: OBGYN | Facility: CLINIC | Age: 38
End: 2018-11-08
Payer: MEDICAID

## 2018-11-08 ENCOUNTER — NON-PROVIDER VISIT (OUTPATIENT)
Dept: OBGYN | Facility: CLINIC | Age: 38
End: 2018-11-08
Payer: MEDICAID

## 2018-11-08 VITALS — SYSTOLIC BLOOD PRESSURE: 122 MMHG | BODY MASS INDEX: 38.06 KG/M2 | WEIGHT: 243 LBS | DIASTOLIC BLOOD PRESSURE: 64 MMHG

## 2018-11-08 DIAGNOSIS — Z86.73 HX OF STROKE WITHOUT RESIDUAL DEFICITS: ICD-10-CM

## 2018-11-08 DIAGNOSIS — O24.410 GDM, CLASS A1: ICD-10-CM

## 2018-11-08 DIAGNOSIS — O24.419 GESTATIONAL DIABETES MELLITUS (GDM), ANTEPARTUM, GESTATIONAL DIABETES METHOD OF CONTROL UNSPECIFIED: ICD-10-CM

## 2018-11-08 LAB — GLUCOSE BLD-MCNC: 116 MG/DL (ref 70–100)

## 2018-11-08 PROCEDURE — 90040 PR PRENATAL FOLLOW UP: CPT | Performed by: OBSTETRICS & GYNECOLOGY

## 2018-11-08 PROCEDURE — 82962 GLUCOSE BLOOD TEST: CPT | Performed by: OBSTETRICS & GYNECOLOGY

## 2018-11-08 PROCEDURE — 97803 MED NUTRITION INDIV SUBSEQ: CPT | Performed by: DIETITIAN, REGISTERED

## 2018-11-08 NOTE — PROGRESS NOTES
Pt here today for OB follow up  Pt states having cx's  Reports +FM  Good # [phone]  Pharmacy Confirmed.  Chaperone offered and accepted  GBS today  Pt didn't bring book, advised to bring next appt.

## 2018-11-08 NOTE — PROGRESS NOTES
Subjective:  Pt states she has not been checking 4 times a day. Gets discouraged that her blood sugars are all high even when she skips a meal.   She did not bring her book or meter today   Is eating only 2 meals a day   Not snacking   Drinking 2 cups milk in the mornings with eggs and waffles.   Bought oatmeal and asks if she can eat it   Has cut back on bread       Objective:   Anthropometrics:   Today's Weight: 243lb   Pre-pregnancy weight: 218lb  Total weight gain: -3.6lb in 1 week;  +25lb total net gain   Weeks gestation: 35w4d    Biochemical data, medical test and procedures:  Lab Results   Component Value Date/Time    POCGLUCOSE 123 (H) 10/01/2018 01:44 PM       Glucose Log Book (most recent):    Fasting glucose range: NA   1 hour post prandial glucose range: NA     Assessment:   Recommended Diet:  2200 calorie meal plan (see media for detailed meal plan)    Assessment Notes:   Based on diet hx pt is not following the meal plan. Reviewed the diet guidelines with her today and she seemed more attentive. She was able to recall the goal blood sugar values for fasting and 1 hr with some review. She needs to stop drinking so much milk and emphasized the importance of eating 3 meals and 3 snacks every day. Reviewed carb containing foods vs non carb containing foods. Reminded pt to eat protein at every meal and every snack.   Pt will likely need insulin based on her reported blood sugars, needing insulin in prior pregnancies, and anticipated poor compliance to the diet.     Plan: Monitoring & Evaluation  Goals:  1. Follow meal plan as outlined above; 3 meals and 3 snacks daily.   2. Check FSBS 4 times a day  (fasting, and 1 hour after each meal)   3. FSBS Goals= Fasting <90; 1 hour PP <130   4. Bring blood sugar log book to each appointment   5. Appropriate weight gain during pregnancy ; ideally pt should not gain any more weight in this pregnancy   6. Do not skip meals or snacks   7. Reduce intake of milk to 1/2  cup in morning ;&  1 cup serving 1 other time a day   8. Eat protein at every meal and snack       Follow up 1week   Crys Ge RD, LD, CDE  [phone]

## 2018-11-09 NOTE — PROGRESS NOTES
S: Pt presents for routine OB follow up. Reports Good fetal movements.  Patient did not bring her glucose log.  Patient states all her fingersticks have been elevated.  She has had diabetes with her past 3 pregnancies which were insulin controlled.  We discussed that she has uncontrolled diabetes and I discussed risk including risk for stillborn with uncontrolled diabetes.  Patient states she cannot follow-up tomorrow but will follow-up Monday with her glucose log so we can start insulin regimen.  No contractions, vaginal bleeding, or leakage of fluid.    Questions answered.    O: /64   Wt 110.2 kg (243 lb)   LMP 2017   BMI 38.06 kg/m²   Patients' weight gain, fluid intake and exercise level discussed.  Vitals, fundal height , fetal position, and FHR reviewed on flowsheet    Lab:  Recent Results (from the past 336 hour(s))   POCT Glucose    Collection Time: 18  2:10 PM   Result Value Ref Range    Glucose - Accu-Ck 116 (A) 70 - 100 mg/dL     Hemoglobin A1c was 6.8    A/P:  38 y.o.  at 35w4d presents for routine obstetric follow-up.  Patient has uncontrolled diabetes at this point likely class B and I discussed diagnosis and risk of uncontrolled diabetes.  Need for strict diet control and need for insulin was discussed.  Patient will follow-up on Monday with her glucose log.  Size slightly greater than dates    1.  Continue prenatal vitamins.  2.  Fetal kick counts 2x wk.  3.  Exercise at least 30 minutes daily.  4.  Increase water intake.  5.  Labor precautions educated.  6.  Follow-up in 1 week.  7.  GBS done today  8.  Precautions and plan of care were reviewed

## 2018-11-12 ENCOUNTER — TELEPHONE (OUTPATIENT)
Dept: OBGYN | Facility: CLINIC | Age: 38
End: 2018-11-12

## 2018-11-12 NOTE — TELEPHONE ENCOUNTER
[facility] called, stated they received a GBS swab but is  as it was sitting on the wrong box and can no longer run it. Pt needs re-collection. Also, pt has HMO Medicaid and [facility] is out of network, so vial was sent to wrong lab. Note out on pt's chart to re-collect GBS at next visit

## 2018-11-19 ENCOUNTER — TELEPHONE (OUTPATIENT)
Dept: OBGYN | Facility: CLINIC | Age: 38
End: 2018-11-19

## 2018-11-19 NOTE — TELEPHONE ENCOUNTER
Pt called wanted to know if her FMLA paper work is done, pt states she dropped it off on 11/14/18. Advised patient FMLA not done and we have 10 business day to complete from the day she dropped them off, advised patient can't promise they will be done today but definitely within the 10 day gisselle. Pt voiced understanding and had no other questions

## 2018-11-21 ENCOUNTER — ROUTINE PRENATAL (OUTPATIENT)
Dept: OBGYN | Facility: CLINIC | Age: 38
End: 2018-11-21
Payer: MEDICAID

## 2018-11-21 VITALS — DIASTOLIC BLOOD PRESSURE: 68 MMHG | SYSTOLIC BLOOD PRESSURE: 108 MMHG | WEIGHT: 244 LBS | BODY MASS INDEX: 38.22 KG/M2

## 2018-11-21 DIAGNOSIS — Z86.73 HX OF STROKE WITHOUT RESIDUAL DEFICITS: ICD-10-CM

## 2018-11-21 DIAGNOSIS — O09.90 SUPERVISION OF HIGH RISK PREGNANCY, ANTEPARTUM: ICD-10-CM

## 2018-11-21 DIAGNOSIS — O24.414 INSULIN CONTROLLED GESTATIONAL DIABETES MELLITUS (GDM) IN THIRD TRIMESTER: ICD-10-CM

## 2018-11-21 LAB
NST ACOUSTIC STIMULATION: NORMAL
NST ACTION NECESSARY: NORMAL
NST ASSESSMENT: NORMAL
NST BASELINE: NORMAL
NST INDICATIONS: NORMAL
NST OTHER DATA: NORMAL
NST READ BY: NORMAL
NST RETURN: NORMAL
NST UTERINE ACTIVITY: NORMAL
STREP GP B DNA PCR: NEGATIVE

## 2018-11-21 PROCEDURE — 59025 FETAL NON-STRESS TEST: CPT | Performed by: OBSTETRICS & GYNECOLOGY

## 2018-11-21 PROCEDURE — 90040 PR PRENATAL FOLLOW UP: CPT | Performed by: OBSTETRICS & GYNECOLOGY

## 2018-11-21 NOTE — PROGRESS NOTES
GDM Class A1. OB F/U.  + fetal movement  Denies any VB, LO or UC's  Phone #[phone]  Pharmacy confirmed  Diabetic supplies: None needed today   GBS today

## 2018-11-21 NOTE — PROGRESS NOTES
MARLY:  37w3d    Pt reports doing well.  Denies vaginal bleeding, contractions, LOF.  Reports +FM.    BG log:  Fastin-157,  >95    Checking sporadic PP,         /68   Wt 110.7 kg (244 lb)   LMP 2017   BMI 38.22 kg/m²   gen: AAO, NAD  FHTs: 150  FH: 42    SVE: 3/60/-2    A/P: 38 y.o.  @ 37w3d w. GDM  - fasting BG elevated, not routinely checking PP.  Will start NPH insulin 5units qHS  - growth US rx given for S>D  - IOL 39wks, ordered today  - NST today    Needs NSTs 2x weekly    Kenya Sepulveda MD  [facility], Women's Health

## 2018-11-26 ENCOUNTER — ROUTINE PRENATAL (OUTPATIENT)
Dept: OBGYN | Facility: CLINIC | Age: 38
End: 2018-11-26
Payer: MEDICAID

## 2018-11-26 ENCOUNTER — HOSPITAL ENCOUNTER (OUTPATIENT)
Facility: MEDICAL CENTER | Age: 38
End: 2018-11-26
Attending: OBSTETRICS & GYNECOLOGY | Admitting: OBSTETRICS & GYNECOLOGY
Payer: MEDICAID

## 2018-11-26 VITALS — HEART RATE: 99 BPM | DIASTOLIC BLOOD PRESSURE: 59 MMHG | SYSTOLIC BLOOD PRESSURE: 114 MMHG

## 2018-11-26 DIAGNOSIS — O24.419 GDM, CLASS A2: ICD-10-CM

## 2018-11-26 LAB
NST ACOUSTIC STIMULATION: ABNORMAL
NST ACTION NECESSARY: ABNORMAL
NST ASSESSMENT: ABNORMAL
NST BASELINE: ABNORMAL
NST INDICATIONS: ABNORMAL
NST OTHER DATA: ABNORMAL
NST READ BY: ABNORMAL
NST RETURN: ABNORMAL
NST UTERINE ACTIVITY: ABNORMAL

## 2018-11-26 PROCEDURE — 59025 FETAL NON-STRESS TEST: CPT

## 2018-11-26 PROCEDURE — 59025 FETAL NON-STRESS TEST: CPT | Performed by: OBSTETRICS & GYNECOLOGY

## 2018-11-26 ASSESSMENT — PAIN SCALES - GENERAL: PAINLEVEL_OUTOF10: 0

## 2018-11-27 ENCOUNTER — APPOINTMENT (OUTPATIENT)
Dept: RADIOLOGY | Facility: IMAGING CENTER | Age: 38
End: 2018-11-27
Attending: OBSTETRICS & GYNECOLOGY
Payer: MEDICAID

## 2018-11-27 DIAGNOSIS — O24.414 INSULIN CONTROLLED GESTATIONAL DIABETES MELLITUS (GDM) IN THIRD TRIMESTER: ICD-10-CM

## 2018-11-27 DIAGNOSIS — O09.90 SUPERVISION OF HIGH RISK PREGNANCY, ANTEPARTUM: ICD-10-CM

## 2018-11-27 PROCEDURE — 76816 OB US FOLLOW-UP PER FETUS: CPT | Performed by: OBSTETRICS & GYNECOLOGY

## 2018-11-27 PROCEDURE — 99999 US-OB LIMITED GROWTH FOLLOW UP: CPT | Mod: 26 | Performed by: OBSTETRICS & GYNECOLOGY

## 2018-11-27 NOTE — PROGRESS NOTES
Pt sent to L&D for extended monitoring. Dr. Kenny reviewed strip, order received to d/c pt home. Pt has an appointment tomorrow at [facility]. Discharge instructions given, pt verbalized understanding. Pt home with family.

## 2018-11-28 ENCOUNTER — HOSPITAL ENCOUNTER (INPATIENT)
Facility: MEDICAL CENTER | Age: 38
LOS: 2 days | End: 2018-11-30
Attending: OBSTETRICS & GYNECOLOGY | Admitting: OBSTETRICS & GYNECOLOGY
Payer: MEDICAID

## 2018-11-28 DIAGNOSIS — Z30.431 ENCOUNTER FOR MANAGEMENT OF INTRAUTERINE CONTRACEPTIVE DEVICE (IUD), UNSPECIFIED IUD MANAGEMENT TYPE: ICD-10-CM

## 2018-11-28 LAB
ALT SERPL-CCNC: 16 U/L (ref 2–50)
APPEARANCE UR: CLEAR
AST SERPL-CCNC: 20 U/L (ref 12–45)
BASOPHILS # BLD AUTO: 0.4 % (ref 0–1.8)
BASOPHILS # BLD: 0.04 K/UL (ref 0–0.12)
COLOR UR AUTO: ABNORMAL
CREAT SERPL-MCNC: 0.73 MG/DL (ref 0.5–1.4)
CRYSTALS AMN MICRO: NORMAL
EOSINOPHIL # BLD AUTO: 0.08 K/UL (ref 0–0.51)
EOSINOPHIL NFR BLD: 0.7 % (ref 0–6.9)
ERYTHROCYTE [DISTWIDTH] IN BLOOD BY AUTOMATED COUNT: 46.5 FL (ref 35.9–50)
GLUCOSE BLD-MCNC: 118 MG/DL (ref 65–99)
GLUCOSE UR QL STRIP.AUTO: NEGATIVE MG/DL
HCT VFR BLD AUTO: 43.2 % (ref 37–47)
HGB BLD-MCNC: 15 G/DL (ref 12–16)
HOLDING TUBE BB 8507: NORMAL
IMM GRANULOCYTES # BLD AUTO: 0.03 K/UL (ref 0–0.11)
IMM GRANULOCYTES NFR BLD AUTO: 0.3 % (ref 0–0.9)
KETONES UR QL STRIP.AUTO: 80 MG/DL
LEUKOCYTE ESTERASE UR QL STRIP.AUTO: ABNORMAL
LYMPHOCYTES # BLD AUTO: 2.79 K/UL (ref 1–4.8)
LYMPHOCYTES NFR BLD: 26.1 % (ref 22–41)
MCH RBC QN AUTO: 31.9 PG (ref 27–33)
MCHC RBC AUTO-ENTMCNC: 34.7 G/DL (ref 33.6–35)
MCV RBC AUTO: 91.9 FL (ref 81.4–97.8)
MONOCYTES # BLD AUTO: 0.54 K/UL (ref 0–0.85)
MONOCYTES NFR BLD AUTO: 5.1 % (ref 0–13.4)
NEUTROPHILS # BLD AUTO: 7.21 K/UL (ref 2–7.15)
NEUTROPHILS NFR BLD: 67.4 % (ref 44–72)
NITRITE UR QL STRIP.AUTO: NEGATIVE
NRBC # BLD AUTO: 0 K/UL
NRBC BLD-RTO: 0 /100 WBC
PH UR STRIP.AUTO: 6.5 [PH]
PLATELET # BLD AUTO: 228 K/UL (ref 164–446)
PMV BLD AUTO: 12.6 FL (ref 9–12.9)
PROT UR QL STRIP: 30 MG/DL
RBC # BLD AUTO: 4.7 M/UL (ref 4.2–5.4)
RBC UR QL AUTO: ABNORMAL
SP GR UR: 1.02
WBC # BLD AUTO: 10.7 K/UL (ref 4.8–10.8)

## 2018-11-28 PROCEDURE — 770002 HCHG ROOM/CARE - OB PRIVATE (112)

## 2018-11-28 PROCEDURE — 304965 HCHG RECOVERY SERVICES

## 2018-11-28 PROCEDURE — 59409 OBSTETRICAL CARE: CPT

## 2018-11-28 PROCEDURE — 81002 URINALYSIS NONAUTO W/O SCOPE: CPT

## 2018-11-28 PROCEDURE — 59400 OBSTETRICAL CARE: CPT | Performed by: OBSTETRICS & GYNECOLOGY

## 2018-11-28 PROCEDURE — 700105 HCHG RX REV CODE 258: Performed by: ANESTHESIOLOGY

## 2018-11-28 PROCEDURE — 36415 COLL VENOUS BLD VENIPUNCTURE: CPT

## 2018-11-28 PROCEDURE — 700102 HCHG RX REV CODE 250 W/ 637 OVERRIDE(OP): Performed by: OBSTETRICS & GYNECOLOGY

## 2018-11-28 PROCEDURE — 84450 TRANSFERASE (AST) (SGOT): CPT

## 2018-11-28 PROCEDURE — 82565 ASSAY OF CREATININE: CPT

## 2018-11-28 PROCEDURE — 700101 HCHG RX REV CODE 250

## 2018-11-28 PROCEDURE — 10907ZC DRAINAGE OF AMNIOTIC FLUID, THERAPEUTIC FROM PRODUCTS OF CONCEPTION, VIA NATURAL OR ARTIFICIAL OPENING: ICD-10-PCS | Performed by: OBSTETRICS & GYNECOLOGY

## 2018-11-28 PROCEDURE — 84460 ALANINE AMINO (ALT) (SGPT): CPT

## 2018-11-28 PROCEDURE — 700111 HCHG RX REV CODE 636 W/ 250 OVERRIDE (IP): Performed by: OBSTETRICS & GYNECOLOGY

## 2018-11-28 PROCEDURE — 700101 HCHG RX REV CODE 250: Performed by: ANESTHESIOLOGY

## 2018-11-28 PROCEDURE — 700105 HCHG RX REV CODE 258: Performed by: OBSTETRICS & GYNECOLOGY

## 2018-11-28 PROCEDURE — 10H07YZ INSERTION OF OTHER DEVICE INTO PRODUCTS OF CONCEPTION, VIA NATURAL OR ARTIFICIAL OPENING: ICD-10-PCS | Performed by: OBSTETRICS & GYNECOLOGY

## 2018-11-28 PROCEDURE — A9270 NON-COVERED ITEM OR SERVICE: HCPCS | Performed by: OBSTETRICS & GYNECOLOGY

## 2018-11-28 PROCEDURE — 700105 HCHG RX REV CODE 258

## 2018-11-28 PROCEDURE — 700111 HCHG RX REV CODE 636 W/ 250 OVERRIDE (IP)

## 2018-11-28 PROCEDURE — 85025 COMPLETE CBC W/AUTO DIFF WBC: CPT

## 2018-11-28 PROCEDURE — 82962 GLUCOSE BLOOD TEST: CPT

## 2018-11-28 PROCEDURE — 10H073Z INSERTION OF MONITORING ELECTRODE INTO PRODUCTS OF CONCEPTION, VIA NATURAL OR ARTIFICIAL OPENING: ICD-10-PCS | Performed by: OBSTETRICS & GYNECOLOGY

## 2018-11-28 PROCEDURE — 303615 HCHG EPIDURAL/SPINAL ANESTHESIA FOR LABOR

## 2018-11-28 PROCEDURE — 89060 EXAM SYNOVIAL FLUID CRYSTALS: CPT

## 2018-11-28 RX ORDER — SODIUM CHLORIDE, SODIUM LACTATE, POTASSIUM CHLORIDE, CALCIUM CHLORIDE 600; 310; 30; 20 MG/100ML; MG/100ML; MG/100ML; MG/100ML
INJECTION, SOLUTION INTRAVENOUS CONTINUOUS
Status: DISPENSED | OUTPATIENT
Start: 2018-11-28 | End: 2018-11-28

## 2018-11-28 RX ORDER — ONDANSETRON 2 MG/ML
4 INJECTION INTRAMUSCULAR; INTRAVENOUS EVERY 6 HOURS PRN
Status: DISCONTINUED | OUTPATIENT
Start: 2018-11-28 | End: 2018-11-28 | Stop reason: HOSPADM

## 2018-11-28 RX ORDER — ROPIVACAINE HYDROCHLORIDE 2 MG/ML
INJECTION, SOLUTION EPIDURAL; INFILTRATION; PERINEURAL
Status: COMPLETED
Start: 2018-11-28 | End: 2018-11-28

## 2018-11-28 RX ORDER — TERBUTALINE SULFATE 1 MG/ML
INJECTION, SOLUTION SUBCUTANEOUS
Status: COMPLETED
Start: 2018-11-28 | End: 2018-11-28

## 2018-11-28 RX ORDER — SODIUM CHLORIDE, SODIUM LACTATE, POTASSIUM CHLORIDE, AND CALCIUM CHLORIDE .6; .31; .03; .02 G/100ML; G/100ML; G/100ML; G/100ML
1000 INJECTION, SOLUTION INTRAVENOUS
Status: DISCONTINUED | OUTPATIENT
Start: 2018-11-28 | End: 2018-11-28 | Stop reason: HOSPADM

## 2018-11-28 RX ORDER — ONDANSETRON 4 MG/1
4 TABLET, ORALLY DISINTEGRATING ORAL EVERY 6 HOURS PRN
Status: DISCONTINUED | OUTPATIENT
Start: 2018-11-28 | End: 2018-11-28 | Stop reason: HOSPADM

## 2018-11-28 RX ORDER — IBUPROFEN 600 MG/1
600 TABLET ORAL EVERY 6 HOURS PRN
Status: DISCONTINUED | OUTPATIENT
Start: 2018-11-28 | End: 2018-11-28 | Stop reason: HOSPADM

## 2018-11-28 RX ORDER — BUPIVACAINE HYDROCHLORIDE 2.5 MG/ML
INJECTION, SOLUTION EPIDURAL; INFILTRATION; INTRACAUDAL
Status: ACTIVE
Start: 2018-11-28 | End: 2018-11-29

## 2018-11-28 RX ORDER — SODIUM CHLORIDE, SODIUM LACTATE, POTASSIUM CHLORIDE, AND CALCIUM CHLORIDE .6; .31; .03; .02 G/100ML; G/100ML; G/100ML; G/100ML
250 INJECTION, SOLUTION INTRAVENOUS PRN
Status: DISCONTINUED | OUTPATIENT
Start: 2018-11-28 | End: 2018-11-28 | Stop reason: HOSPADM

## 2018-11-28 RX ORDER — SODIUM CHLORIDE, SODIUM LACTATE, POTASSIUM CHLORIDE, CALCIUM CHLORIDE 600; 310; 30; 20 MG/100ML; MG/100ML; MG/100ML; MG/100ML
INJECTION, SOLUTION INTRAVENOUS
Status: COMPLETED
Start: 2018-11-28 | End: 2018-11-28

## 2018-11-28 RX ORDER — ROPIVACAINE HYDROCHLORIDE 2 MG/ML
INJECTION, SOLUTION EPIDURAL; INFILTRATION; PERINEURAL CONTINUOUS
Status: DISCONTINUED | OUTPATIENT
Start: 2018-11-28 | End: 2018-11-29

## 2018-11-28 RX ORDER — ACETAMINOPHEN 325 MG/1
650 TABLET ORAL ONCE
Status: COMPLETED | OUTPATIENT
Start: 2018-11-28 | End: 2018-11-28

## 2018-11-28 RX ADMIN — Medication 125 ML/HR: at 23:09

## 2018-11-28 RX ADMIN — Medication 2000 ML/HR: at 21:30

## 2018-11-28 RX ADMIN — SODIUM CHLORIDE, POTASSIUM CHLORIDE, SODIUM LACTATE AND CALCIUM CHLORIDE: 600; 310; 30; 20 INJECTION, SOLUTION INTRAVENOUS at 19:15

## 2018-11-28 RX ADMIN — TERBUTALINE SULFATE 0.25 MG: 1 INJECTION, SOLUTION SUBCUTANEOUS at 18:33

## 2018-11-28 RX ADMIN — ROPIVACAINE HYDROCHLORIDE: 2 INJECTION, SOLUTION EPIDURAL; INFILTRATION at 16:56

## 2018-11-28 RX ADMIN — Medication 2 MILLI-UNITS/MIN: at 16:25

## 2018-11-28 RX ADMIN — SODIUM CHLORIDE, POTASSIUM CHLORIDE, SODIUM LACTATE AND CALCIUM CHLORIDE: 600; 310; 30; 20 INJECTION, SOLUTION INTRAVENOUS at 15:56

## 2018-11-28 RX ADMIN — EPHEDRINE SULFATE 5 MG: 50 INJECTION INTRAMUSCULAR; INTRAVENOUS; SUBCUTANEOUS at 18:36

## 2018-11-28 RX ADMIN — ROPIVACAINE HYDROCHLORIDE: 2 INJECTION, SOLUTION EPIDURAL; INFILTRATION; PERINEURAL at 16:56

## 2018-11-28 RX ADMIN — ACETAMINOPHEN 650 MG: 325 TABLET, FILM COATED ORAL at 20:20

## 2018-11-28 RX ADMIN — EPHEDRINE SULFATE 5 MG: 50 INJECTION INTRAMUSCULAR; INTRAVENOUS; SUBCUTANEOUS at 18:41

## 2018-11-28 RX ADMIN — SODIUM CHLORIDE, POTASSIUM CHLORIDE, SODIUM LACTATE AND CALCIUM CHLORIDE: 600; 310; 30; 20 INJECTION, SOLUTION INTRAVENOUS at 17:20

## 2018-11-28 ASSESSMENT — PATIENT HEALTH QUESTIONNAIRE - PHQ9
1. LITTLE INTEREST OR PLEASURE IN DOING THINGS: NOT AT ALL
2. FEELING DOWN, DEPRESSED, IRRITABLE, OR HOPELESS: NOT AT ALL
SUM OF ALL RESPONSES TO PHQ9 QUESTIONS 1 AND 2: 0

## 2018-11-28 ASSESSMENT — PAIN SCALES - GENERAL: PAINLEVEL_OUTOF10: 3

## 2018-11-28 ASSESSMENT — LIFESTYLE VARIABLES
EVER_SMOKED: YES
ALCOHOL_USE: NO

## 2018-11-28 NOTE — PROGRESS NOTES
Report received from Robbie BEST.  Patient states that she isnt sure if she is leaking fluid. SVE 4/60/-2, posterior. Sterile spec shows no pooling.    Discussed with Dr Garcia, patient to be rechecked in 1 hour.  Patient rechecked at 1400, sve 5/60/-2.  Patient to be admitted.  Admission done.  Patient starting to feel contractions, Dr Liriano at bedside for epidural at 1640.  Patient not feeling any relief.  Dr Liriano notified.  1736 patient seated and epidural repositioned and bolused.  Patient still not feeling relief.  Dr Liriano at bedside for CSE.  Patient feeling nauseated and patient repositioned.  Unable to trace baby, Dr Garcia called at 1826, FSE place at 1827/AROM/Clear.  Pit stopped, terb given, fluids open.  Dr Liriano at bedside, See flow sheets for BP's. Report given to Justyna Sabillon RN.

## 2018-11-28 NOTE — PROGRESS NOTES
1214. Pt here with c/o uc's since 1100 this morning. Tyshawn leaking or bleeding but has an increase in discharge. EDC is 18= 38.3 weeks. She is a . Urine obtained and dipped.

## 2018-11-29 LAB
ERYTHROCYTE [DISTWIDTH] IN BLOOD BY AUTOMATED COUNT: 46.9 FL (ref 35.9–50)
GLUCOSE BLD-MCNC: 134 MG/DL (ref 65–99)
GLUCOSE BLD-MCNC: 158 MG/DL (ref 65–99)
GLUCOSE BLD-MCNC: 189 MG/DL (ref 65–99)
HCT VFR BLD AUTO: 36.6 % (ref 37–47)
HGB BLD-MCNC: 12.5 G/DL (ref 12–16)
MCH RBC QN AUTO: 31.8 PG (ref 27–33)
MCHC RBC AUTO-ENTMCNC: 34.2 G/DL (ref 33.6–35)
MCV RBC AUTO: 93.1 FL (ref 81.4–97.8)
PLATELET # BLD AUTO: 188 K/UL (ref 164–446)
PMV BLD AUTO: 12.5 FL (ref 9–12.9)
RBC # BLD AUTO: 3.93 M/UL (ref 4.2–5.4)
WBC # BLD AUTO: 14.4 K/UL (ref 4.8–10.8)

## 2018-11-29 PROCEDURE — 700112 HCHG RX REV CODE 229: Performed by: OBSTETRICS & GYNECOLOGY

## 2018-11-29 PROCEDURE — 770002 HCHG ROOM/CARE - OB PRIVATE (112)

## 2018-11-29 PROCEDURE — A9270 NON-COVERED ITEM OR SERVICE: HCPCS | Performed by: OBSTETRICS & GYNECOLOGY

## 2018-11-29 PROCEDURE — A9270 NON-COVERED ITEM OR SERVICE: HCPCS | Performed by: STUDENT IN AN ORGANIZED HEALTH CARE EDUCATION/TRAINING PROGRAM

## 2018-11-29 PROCEDURE — 700102 HCHG RX REV CODE 250 W/ 637 OVERRIDE(OP): Performed by: STUDENT IN AN ORGANIZED HEALTH CARE EDUCATION/TRAINING PROGRAM

## 2018-11-29 PROCEDURE — 82962 GLUCOSE BLOOD TEST: CPT | Mod: 91

## 2018-11-29 PROCEDURE — 700102 HCHG RX REV CODE 250 W/ 637 OVERRIDE(OP): Performed by: OBSTETRICS & GYNECOLOGY

## 2018-11-29 PROCEDURE — 85027 COMPLETE CBC AUTOMATED: CPT

## 2018-11-29 PROCEDURE — 36415 COLL VENOUS BLD VENIPUNCTURE: CPT

## 2018-11-29 RX ORDER — OXYCODONE HYDROCHLORIDE AND ACETAMINOPHEN 5; 325 MG/1; MG/1
1 TABLET ORAL EVERY 6 HOURS PRN
Status: DISCONTINUED | OUTPATIENT
Start: 2018-11-29 | End: 2018-11-30 | Stop reason: HOSPADM

## 2018-11-29 RX ORDER — SODIUM CHLORIDE, SODIUM LACTATE, POTASSIUM CHLORIDE, CALCIUM CHLORIDE 600; 310; 30; 20 MG/100ML; MG/100ML; MG/100ML; MG/100ML
INJECTION, SOLUTION INTRAVENOUS PRN
Status: DISCONTINUED | OUTPATIENT
Start: 2018-11-29 | End: 2018-11-30 | Stop reason: HOSPADM

## 2018-11-29 RX ORDER — DOCUSATE SODIUM 100 MG/1
100 CAPSULE, LIQUID FILLED ORAL 2 TIMES DAILY PRN
Status: DISCONTINUED | OUTPATIENT
Start: 2018-11-29 | End: 2018-11-30 | Stop reason: HOSPADM

## 2018-11-29 RX ORDER — ACETAMINOPHEN 500 MG
500 TABLET ORAL EVERY 6 HOURS PRN
Status: DISCONTINUED | OUTPATIENT
Start: 2018-11-29 | End: 2018-11-30 | Stop reason: HOSPADM

## 2018-11-29 RX ORDER — BISACODYL 10 MG
10 SUPPOSITORY, RECTAL RECTAL PRN
Status: DISCONTINUED | OUTPATIENT
Start: 2018-11-29 | End: 2018-11-30 | Stop reason: HOSPADM

## 2018-11-29 RX ORDER — DIPHENHYDRAMINE HCL 25 MG
25 TABLET ORAL EVERY 6 HOURS PRN
Status: DISCONTINUED | OUTPATIENT
Start: 2018-11-29 | End: 2018-11-30 | Stop reason: HOSPADM

## 2018-11-29 RX ORDER — VITAMIN A ACETATE, BETA CAROTENE, ASCORBIC ACID, CHOLECALCIFEROL, .ALPHA.-TOCOPHEROL ACETATE, DL-, THIAMINE MONONITRATE, RIBOFLAVIN, NIACINAMIDE, PYRIDOXINE HYDROCHLORIDE, FOLIC ACID, CYANOCOBALAMIN, CALCIUM CARBONATE, FERROUS FUMARATE, ZINC OXIDE, CUPRIC OXIDE 3080; 12; 120; 400; 1; 1.84; 3; 20; 22; 920; 25; 200; 27; 10; 2 [IU]/1; UG/1; MG/1; [IU]/1; MG/1; MG/1; MG/1; MG/1; MG/1; [IU]/1; MG/1; MG/1; MG/1; MG/1; MG/1
1 TABLET, FILM COATED ORAL EVERY MORNING
Status: DISCONTINUED | OUTPATIENT
Start: 2018-11-29 | End: 2018-11-30 | Stop reason: HOSPADM

## 2018-11-29 RX ORDER — IBUPROFEN 600 MG/1
600 TABLET ORAL EVERY 6 HOURS PRN
Status: DISCONTINUED | OUTPATIENT
Start: 2018-11-29 | End: 2018-11-30 | Stop reason: HOSPADM

## 2018-11-29 RX ADMIN — ACETAMINOPHEN 500 MG: 500 TABLET ORAL at 05:17

## 2018-11-29 RX ADMIN — OXYCODONE AND ACETAMINOPHEN 1 TABLET: 5; 325 TABLET ORAL at 20:44

## 2018-11-29 RX ADMIN — DOCUSATE SODIUM 100 MG: 100 CAPSULE, LIQUID FILLED ORAL at 20:44

## 2018-11-29 RX ADMIN — Medication 1 TABLET: at 09:09

## 2018-11-29 RX ADMIN — IBUPROFEN 600 MG: 600 TABLET, FILM COATED ORAL at 07:39

## 2018-11-29 RX ADMIN — IBUPROFEN 600 MG: 600 TABLET, FILM COATED ORAL at 00:48

## 2018-11-29 RX ADMIN — OXYCODONE AND ACETAMINOPHEN 1 TABLET: 5; 325 TABLET ORAL at 13:06

## 2018-11-29 RX ADMIN — ACETAMINOPHEN 500 MG: 500 TABLET ORAL at 11:24

## 2018-11-29 RX ADMIN — IBUPROFEN 600 MG: 600 TABLET, FILM COATED ORAL at 17:32

## 2018-11-29 ASSESSMENT — PAIN SCALES - GENERAL
PAINLEVEL_OUTOF10: 3
PAINLEVEL_OUTOF10: 6
PAINLEVEL_OUTOF10: 3

## 2018-11-29 ASSESSMENT — PATIENT HEALTH QUESTIONNAIRE - PHQ9
2. FEELING DOWN, DEPRESSED, IRRITABLE, OR HOPELESS: NOT AT ALL
1. LITTLE INTEREST OR PLEASURE IN DOING THINGS: NOT AT ALL
SUM OF ALL RESPONSES TO PHQ9 QUESTIONS 1 AND 2: 0

## 2018-11-29 NOTE — PROGRESS NOTES
Patient admitted to room S321. Bedside report received from TAMIKA Fierro RN. Assessment complete. POC discussed. Patient will request pain medication as needed. Patient oriented to room and educated on call light and emergency light. Baby's clipboard reviewed and instructions given for filling it out. Call light within reach. Will continue to monitor.

## 2018-11-29 NOTE — PROGRESS NOTES
0700- Bedside report received from Alta RN- poc discussed  0730- baby breastfeeding, pt asking for motrin for cramping, light lochia  0739- motrin given  0945- pt up to take a shower, baby to nursery for bath  1000- order from Dr. Hansen to cancel every 4 hr BS  1306- percocet given  1400- pt states she feels much better now after the percocet  1600- pt resting no needs  1732- ibuprofen given for cramping  1900- Bedside report given Alta RN- poc discussed

## 2018-11-29 NOTE — L&D DELIVERY NOTE
Vaginal Delivery Procedure Note:     Patient was admitted to Labor and Delivery at 38w3d for early labor with cat II FHTs  Annabel Bethea is a 38 y.o. , now Para 7016 admitted for early labor with cat II FHTs.  Her labor course progressed with pitocin augmentation as well as AROM.  Immediately after CSE placement, pt had hypotension with fetal decelerations requiring cessation of pitocin and pressor administration as well as 1 dose of terbutaline to decrease contractions.  She then progressed on own mechanism to complete without further incident.    PreDelivery Diagnosis:  1. SIUP @ 38w3d  2. GDM, never started insulin as prescribed      PostDelivery Diagnosis:  1. S/p       Procedure in Detail:  Pt pushing dorsal lithotomy position.  Head delivered easily over intact perineum.  Anterior shoulder followed easily with gentle downwards pressure followed by the posterior shoulder and body.  Infant delivered @ 2122.  There was no nuchal cord.  Infant was placed on the maternal abdomen and was stimulated and bulb suctioned.  Cord clamping was delayed x60 seconds then the cord was clamped x2 and cut.  Infant APGARs 8 and 9 and 1 and 5 minutes, respectively.  Birth weight 3920g.  IV pitocin was started per postpartum pit protocol.  Placenta delivered spontaneously intact with 3 Vessel cord.  Cord gases were sent given cat II FHTs in labor.  The vagina and perineum were examined and no lacerations were noted.  The uterus was firm with IV pitocin and fundal massage.  Pt and infant left bonding in LDR.    EBL 250cc  Anesthesia - epidural  Sponge and needle counts correct.  Patient tolerated procedure well.    Anticipate routine postparum care.      Kenya Sepulveda MD  [facility], Women's [facility]

## 2018-11-29 NOTE — PROGRESS NOTES
Brief Progress Note:    Called to bedside by RN at [id number] for inability to trace fetal heart tones after epidural placement with concern for hypotension.  Pt vomiting on arrival.  Blood pressure cuff not reading secondary to movement/vomiting.  FHTs not traceable but intermittently sounded like may be low.  SVE 5-6/70/-2.  Membranes ruptured producing clear fluid and FSE placed for improved fetal heart rate tracing.  FHTs 60s-50s.  BP 80s/40s.  Anesthesia called back to bedside, pitocin stopped and IVF bolus started.  FHTs increased to 130s then returned to 60s, suspect with additional contraction.  Discussed briefly with pt may need c/s if FHTs not recoverable with improvement in BP - pt again confirms verbally she would want her tubes tied in that event.  Terbutaline x1 given as anesthesia giving ephedrine then phenylephrine for hypotension.    Bps improved to 115/59 when I left the room, FHTs tachycardic in 160s but without further decels and moderate variability, overall reassuring.      Cont to monitor, will leave pit off for now and at least 30 minutes to allow recovery.    Kenya Sepulveda MD  [facility], Women's Health     2015 Addendum:  Repeat SVE unchanged; FHTs still 160s baseline but with moderate variability.  Few intermittent decels but not able to tract ctx so IUPC placed to better assess uterine activity.    Kenya Sepulveda MD  [facility], Women's Health

## 2018-11-29 NOTE — H&P
OB H&P:    CC: increased discharge, contractions    HPI:  Ms. Annabel Bethea is a 38 y.o.  @ 38w3d by 9wk  w/ GDM (A2 however never started insulin) c/o contractions and some increased discharge.  No gushes of fluid.      Contractions: Yes , irregular  Loss of fluid: No   Vaginal bleeding: No   Fetal movement: +      PNC with TPC.  Pt has hx of ?TIA in the past, has seen MFM and neurology for this.  Per Dr. Aragon some concern based on labs for protein S deficiency (difficult to diagnose in pregnancy), per neuro history questionable for stroke vs migraine - did not recommend pharmacologic prophylaxis based on history obtained.  Of note patient had normal pregnancy after that incident (which was approximately 4-5 years ago) and did not receive thromboprophylaxis.  Had no complications around pregnancy around delivery.  Patient diagnosed with gestational diabetes.  Plan has been to start insulin approximately 1 week ago, however patient reports she was unable to obtain into yesterday never did start.  Plan had been to start 5 units nightly NPH for elevated fasting BGs and intermittent elevated postprandial BGs.  A1c 6.8.  Has been on ASA 81 mg daily which she stopped approximately 2 weeks ago    PNL:  Rh/-, RI, HIV neg, RPR NR, HBsAg NR, GC/CT neg/neg  GBS neg      ROS:  Const: denies fevers, general concerns  CV/resp: reports no concerns  GI: denies abd pain, GI concerns  : see HPI  Neuro: denies HA/vision changes    OB History    Para Term  AB Living   8 6 6   1 6   SAB TAB Ectopic Molar Multiple Live Births   1       1 6      # Outcome Date GA Lbr Alberto/2nd Weight Sex Delivery Anes PTL Lv   8A             8B Current            7 2018 9w0d    SAB      6 Term 05/15/17 39w0d  3.175 kg (7 lb) F Vag-Spont  N SILVERIO      Birth Comments: [facility]   5 Term 06/03/10 40w0d  3.629 kg (8 lb) F Vag-Spont EPI N SILVERIO      Birth Comments: GDM A2, had anxiety.   4 Term 09 40w0d  3.629 kg (8 lb)  F Vag-Spont EPI N SILVERIO      Birth Comments: Pt states had a high risk pregnancy, GDM A2   3 Term 12/22/05 41w0d   M Vag-Spont EPI N SILVERIO      Birth Comments: Pt states was induced. Pt. states son is disabled.    2 Term 04/09/04 40w0d  3.629 kg (8 lb) M Vag-Spont EPI N SILVERIO      Birth Comments: Pt states no complications.    1 Term 08/15/97 40w0d  3.629 kg (8 lb) F Vag-Spont EPI N SILVERIO      Birth Comments: Pt states no complications.          GYN: denies STIs, no cervical procedures    Past Medical History:   Diagnosis Date   • Allergy    • Anxiety 2010   • Depression    • Diabetes (HCC)     GDM last two pregnancy's   • Hemiplegic migraine without status migrainosus, not intractable 9/26/2018   • Stroke (HCC)     was told by md that she had a mild stroke x 1 in 2015       Past Surgical History:   Procedure Laterality Date   • LUMBAR LAMINECTOMY DISKECTOMY Left 7/21/2017    Procedure: LUMBAR LAMINECTOMY DISKECTOMY - L4-5 MICRODISCECTOMY;  Surgeon: Neeraj Burrell M.D.;  Location: SURGERY [facility];  Service:    • OTHER  2009    cholecytectomy   • CHOLECYSTECTOMY         Meds: pnv    Family History   Problem Relation Age of Onset   • Diabetes Paternal Grandmother    • Cancer Paternal Grandmother         breast   • Hypertension Paternal Grandmother    • Stroke Paternal Grandmother    • Other Paternal Grandmother         gout   • Arthritis Paternal Grandmother        Social History     Social History Main Topics   • Smoking status: Former Smoker     Packs/day: 0.25     Types: Cigarettes     Quit date: 10/1/2016   • Smokeless tobacco: Former User     Quit date: 9/28/2016      Comment: Pt. states last smoked when she found out she was pregnant.    • Alcohol use No   • Drug use: No       PE:  120s-140s/80-90s  gen: AAO, NAD  abd: soft, gravid, NT, EFW 3800g, difficult secondary to body habitus.    Ext: NT, tr edema    SVE: 5/70/-2 @ approximately 1530   Previously 4/60/-2  FHT: 145/mod variability/+ accels/ 1 possible  late decel (discontinuous tracing), 1 mild variable decels  Candace: irregular ctx, approx q3-8min    Results for KIMBERLI ORTIZ (MRN [id number]) as of 2018 16:52   Ref. Range 2018 15:31   Creatinine Latest Ref Range: 0.50 - 1.40 mg/dL 0.73   GFR If  Latest Ref Range: >60 mL/min/1.73 m 2 >60   GFR If Non  Latest Ref Range: >60 mL/min/1.73 m 2 >60   AST(SGOT) Latest Ref Range: 12 - 45 U/L 20   ALT(SGPT) Latest Ref Range: 2 - 50 U/L 16     Results for KIMBERLI ORTIZ (MRN [id number]) as of 2018 16:52   Ref. Range 2018 15:31   Hemoglobin Latest Ref Range: 12.0 - 16.0 g/dL 15.0   Hematocrit Latest Ref Range: 37.0 - 47.0 % 43.2   MCV Latest Ref Range: 81.4 - 97.8 fL 91.9   MCH Latest Ref Range: 27.0 - 33.0 pg 31.9   MCHC Latest Ref Range: 33.6 - 35.0 g/dL 34.7   RDW Latest Ref Range: 35.9 - 50.0 fL 46.5   Platelet Count Latest Ref Range: 164 - 446 K/uL 228     A/P: 38 y.o.  @ 38w3d by 9wk US w/ uncontrolled GDM, questionable hx of TIA vs complex migraine in latent labor with elevated Bps and cat II FHTs  - mild range BPs: pt asymptomatic, serum labs wnl.  P/c not yet results  - appears to be in latent labor, recommend augmentation given cat II FHTs and elevated Bps  - GDM: pt noncompliant with monitoring.  Will monitor BGs here,  EFW difficult on abdominal exam given body habitus, did have growth US yesterday with EFW 3113g.  - GBS neg  - TIA vs complex migraine; + protein S deficiency; per neuro no indication for pharmacologic ppx if she were not need c/s would recommend postop lovenox, previously on ASA 81mg daily which she stopped approx 2wks ago.    - pit 2x2, plan for epidural when desired  - pt confirms that should she need a c/s for any reason she would like to have a tubal ligation.  Endorses that she does not desire more children in the future.        Kenya Sepulveda MD  [facility], Women's Health

## 2018-11-29 NOTE — CARE PLAN
Problem: Risk for Infection, Impaired Wound Healing  Goal: Remain free from signs and symptoms of infection  Outcome: PROGRESSING AS EXPECTED  Pt afebrile, no s/s of infection    Problem: Risk for injury  Goal: Patient and fetus will be free of preventable injury/complications  Outcome: PROGRESSING AS EXPECTED  Pt and fetus continuously being monitored, TOCO and FSE in place

## 2018-11-29 NOTE — PROGRESS NOTES
S   39 y/o now  s/p   late of baby girl without complications. No laceration. PPD1. Reports ambulating, pain well controlled for now, has eaten and hydrated since delivery, has urinated, has passed gas, has not had BM. Reports bleeding as moderate. Still receiving IV fluids.     Baby is doing well, breastfeeding exclusively.  No issues with breastfeeding at this time although reports no colostrom has come in yet.     O  See PE: Physical exam today with no abnormal findings  Vital signs WNL: BP and weight   H/H: 12.5/36.6    A  Reassuring exam of lactating postpartum woman s/p  PPD1  P  - Ambulation  - Lactation consultation placed  - Continued Blood sugar monitoring   - Anticipate d/c on PPD2 if stable

## 2018-11-29 NOTE — PROGRESS NOTES
190-Report received from [facility] BEST.   -Dr. Garcia at bedside, [facility] place, SVE 5-680-2  -Pt c/o headache, spoke with Dr. Garcia, orders for Tylenol (see MAR)  -Pt called out, reports feeling pressure  -Dr. Garcia at bedside, SVE 8100/0  -Dr. Garcia at bedside, SVE C/0, started prepping pt for delivery  - of viable female infant, Dr. Garcia attending, apgars 8/9  0-Pt up to restroom, voided, pericare provided, gown changed.  -Pt transferred to  via wheelchair. Report given to [place] BEST

## 2018-11-29 NOTE — DISCHARGE PLANNING
Discharge Planning Assessment Post Partum    Reason for Referral: History of post partum depression  Address: [facility] [address] [place] [address]  Phone:  [phone]  Type of Living Situation: living with FOB and children  Mom Diagnosis: Pregnancy   Baby Diagnosis:   Primary Language: English    Father of the Baby: Claude Priest  Involved in baby’s care? Yes  Contact Information: [phone]    Prenatal Care: Yes  Mom's PCP: None  PCP for new baby: Provided a pediatrician list.  MOB would like an MD for the whole family.  Discussed [facility], [facility], and [facility] clinic.  Provided phone numbers to all.    Support System: FOB  Coping/Bonding between mother & baby: Yes  Source of Feeding: breast feeding  Supplies for Infant: prepared for infant    Mom's Insurance: Medicaid  Baby Covered on Insurance:Yes  Mother Employed/School: Not currently  Other children in the home/names & ages: MOB's 7th baby    Financial Hardship/Income: denies, FOB will be staying at home for the next several weeks on FMLA to assist.   Mom's Mental status: alert and oriented  Services used prior to admit: Medicaid and WIC    CPS History: denies  Psychiatric History: post partum depression.  Provided MOB with a counseling resource specializing in post partum depression  Domestic Violence History: denies  Drug/ETOH History: denies    Resources Provided: Pediatrician list and a counseling resource list  Referrals Made: None     Clearance for Discharge: Infant is cleared to discharge home with MOB.

## 2018-11-29 NOTE — CARE PLAN
Problem: Altered physiologic condition related to immediate post-delivery state and potential for bleeding/hemorrhage  Goal: Patient physiologically stable as evidenced by normal lochia, palpable uterine involution and vital signs within normal limits    Intervention: Perform physical assessment and obtain vital signs on patient as directed in Intrapartum/Postpartum Standard of Care in Policy and Procedure manual  Pt stable at this time, bleeding light      Problem: Potential knowledge deficit related to lack of understanding of self and  care  Goal: Patient will verbalize understanding of self and infant care    Intervention: Assess patient and knowledge of self and infant care  Pt encouraged to ask questions, verbalize needs and wants

## 2018-11-29 NOTE — LACTATION NOTE
This note was copied from a baby's chart.  Mother's 7th child, weaned most recent at 8 months of pregnancy due to biting and nipple/breast pain. Palpable tender lump right breast in areolar region, instructed mother to have MD assess. Infant latching frequently, mother independent after education on deeper latch. See lactation flow sheet for more details.

## 2018-11-30 VITALS
DIASTOLIC BLOOD PRESSURE: 63 MMHG | HEART RATE: 82 BPM | RESPIRATION RATE: 18 BRPM | TEMPERATURE: 97.8 F | SYSTOLIC BLOOD PRESSURE: 105 MMHG | OXYGEN SATURATION: 98 % | HEIGHT: 67 IN | WEIGHT: 245 LBS | BODY MASS INDEX: 38.45 KG/M2

## 2018-11-30 PROCEDURE — A9270 NON-COVERED ITEM OR SERVICE: HCPCS | Performed by: STUDENT IN AN ORGANIZED HEALTH CARE EDUCATION/TRAINING PROGRAM

## 2018-11-30 PROCEDURE — 700111 HCHG RX REV CODE 636 W/ 250 OVERRIDE (IP): Performed by: OBSTETRICS & GYNECOLOGY

## 2018-11-30 PROCEDURE — 700102 HCHG RX REV CODE 250 W/ 637 OVERRIDE(OP): Performed by: STUDENT IN AN ORGANIZED HEALTH CARE EDUCATION/TRAINING PROGRAM

## 2018-11-30 PROCEDURE — A9270 NON-COVERED ITEM OR SERVICE: HCPCS | Performed by: OBSTETRICS & GYNECOLOGY

## 2018-11-30 PROCEDURE — 90686 IIV4 VACC NO PRSV 0.5 ML IM: CPT | Performed by: OBSTETRICS & GYNECOLOGY

## 2018-11-30 PROCEDURE — 3E02340 INTRODUCTION OF INFLUENZA VACCINE INTO MUSCLE, PERCUTANEOUS APPROACH: ICD-10-PCS | Performed by: OBSTETRICS & GYNECOLOGY

## 2018-11-30 PROCEDURE — 90471 IMMUNIZATION ADMIN: CPT

## 2018-11-30 PROCEDURE — 700102 HCHG RX REV CODE 250 W/ 637 OVERRIDE(OP): Performed by: OBSTETRICS & GYNECOLOGY

## 2018-11-30 RX ORDER — PSEUDOEPHEDRINE HCL 30 MG
100 TABLET ORAL 2 TIMES DAILY PRN
Qty: 60 CAP | Refills: 0 | Status: SHIPPED | OUTPATIENT
Start: 2018-11-30 | End: 2020-08-23

## 2018-11-30 RX ORDER — VITAMIN A ACETATE, BETA CAROTENE, ASCORBIC ACID, CHOLECALCIFEROL, .ALPHA.-TOCOPHEROL ACETATE, DL-, THIAMINE MONONITRATE, RIBOFLAVIN, NIACINAMIDE, PYRIDOXINE HYDROCHLORIDE, FOLIC ACID, CYANOCOBALAMIN, CALCIUM CARBONATE, FERROUS FUMARATE, ZINC OXIDE, CUPRIC OXIDE 3080; 12; 120; 400; 1; 1.84; 3; 20; 22; 920; 25; 200; 27; 10; 2 [IU]/1; UG/1; MG/1; [IU]/1; MG/1; MG/1; MG/1; MG/1; MG/1; [IU]/1; MG/1; MG/1; MG/1; MG/1; MG/1
1 TABLET, FILM COATED ORAL EVERY MORNING
Qty: 30 TAB | Refills: 9 | Status: SHIPPED | OUTPATIENT
Start: 2018-11-30 | End: 2020-08-23

## 2018-11-30 RX ORDER — IBUPROFEN 600 MG/1
600 TABLET ORAL EVERY 6 HOURS PRN
Qty: 30 TAB | Refills: 1 | Status: SHIPPED | OUTPATIENT
Start: 2018-11-30 | End: 2020-08-23

## 2018-11-30 RX ADMIN — OXYCODONE AND ACETAMINOPHEN 1 TABLET: 5; 325 TABLET ORAL at 04:56

## 2018-11-30 RX ADMIN — IBUPROFEN 600 MG: 600 TABLET, FILM COATED ORAL at 04:56

## 2018-11-30 RX ADMIN — IBUPROFEN 600 MG: 600 TABLET, FILM COATED ORAL at 11:37

## 2018-11-30 RX ADMIN — INFLUENZA A VIRUS A/MICHIGAN/45/2015 X-275 (H1N1) ANTIGEN (FORMALDEHYDE INACTIVATED), INFLUENZA A VIRUS A/SINGAPORE/INFIMH-16-0019/2016 IVR-186 (H3N2) ANTIGEN (FORMALDEHYDE INACTIVATED), INFLUENZA B VIRUS B/PHUKET/3073/2013 ANTIGEN (FORMALDEHYDE INACTIVATED), AND INFLUENZA B VIRUS B/MARYLAND/15/2016 BX-69A ANTIGEN (FORMALDEHYDE INACTIVATED) 0.5 ML: 15; 15; 15; 15 INJECTION, SUSPENSION INTRAMUSCULAR at 10:41

## 2018-11-30 RX ADMIN — OXYCODONE AND ACETAMINOPHEN 1 TABLET: 5; 325 TABLET ORAL at 11:37

## 2018-11-30 ASSESSMENT — EDINBURGH POSTNATAL DEPRESSION SCALE (EPDS)
I HAVE LOOKED FORWARD WITH ENJOYMENT TO THINGS: AS MUCH AS I EVER DID
I HAVE BEEN SO UNHAPPY THAT I HAVE BEEN CRYING: ONLY OCCASIONALLY
I HAVE FELT SAD OR MISERABLE: NO, NOT AT ALL
I HAVE BEEN SO UNHAPPY THAT I HAVE HAD DIFFICULTY SLEEPING: NOT AT ALL
I HAVE BEEN ABLE TO LAUGH AND SEE THE FUNNY SIDE OF THINGS: NOT QUITE SO MUCH NOW
I HAVE BEEN ANXIOUS OR WORRIED FOR NO GOOD REASON: YES, SOMETIMES
I HAVE BLAMED MYSELF UNNECESSARILY WHEN THINGS WENT WRONG: NOT VERY OFTEN
THINGS HAVE BEEN GETTING ON TOP OF ME: NO, MOST OF THE TIME I HAVE COPED QUITE WELL
I HAVE FELT SCARED OR PANICKY FOR NO GOOD REASON: NO, NOT MUCH
THE THOUGHT OF HARMING MYSELF HAS OCCURRED TO ME: NEVER

## 2018-11-30 ASSESSMENT — PAIN SCALES - GENERAL
PAINLEVEL_OUTOF10: 3
PAINLEVEL_OUTOF10: 3
PAINLEVEL_OUTOF10: 8
PAINLEVEL_OUTOF10: 5

## 2018-11-30 NOTE — LACTATION NOTE
Baby not in room when LC arrived, mother reports baby BF well, discussed expected feeding frequency and duration, reinforced the importance of a deep latch, mother reports lump in right breast, on assessment lump noted which appears to be easily moveable, colostrum expressed easily via HE, no signs of mastitis noted during assessment, discussed signs of mastitis to watch for and encouraged to call MD for any noted signs/symptoms of mastitis, encouraged to make MD aware of lump just for a baseline especially in relation to reported family history of cancer, encouraged heat and breast massage as well as HE to help keep milk moving, discussed importance of being sure to keep milk moving.    Baby was 38.3 weeks gestation at birth, 6.63% weight loss at approximately 23 hours of age, mother reports possible low supply when BF 18 month old (she has BF all of her previous children as well), discussed potential need to supplement if poor feeding/inadequate urine output/increased jaundice/appears dehydrated, supplement guidelines provided.    Mother has [facility], discussed assistance available at [facility] after discharge, encouraged to  WIC pump from [facility] in case needs to supplement, also discussed assistance available at [facility] after discharge and invited to  [place]    Encouraged to call for assistance as needed

## 2018-11-30 NOTE — DISCHARGE SUMMARY
Discharge Summary:      Annabel Bethea      Admit Date:   2018  Discharge Date:  2018     Admitting diagnosis:  Pregnancy  Pregnancy  Abnormal fetal heart rate  Gestational diabetes necessitating insulin which patient did not start  Normal labor  Discharge Diagnosis: Status post vaginal, spontaneous.  Pregnancy Complications: gestational diabetes  Tubal Ligation:  no        History:  Past Medical History:   Diagnosis Date   • Allergy    • Anxiety    • Depression    • Diabetes (HCC)     GDM last two pregnancy's   • Hemiplegic migraine without status migrainosus, not intractable 2018   • Stroke (HCC)     was told by md that she had a mild stroke x 1 in      OB History    Para Term  AB Living   8 7 7   1 7   SAB TAB Ectopic Molar Multiple Live Births   1       0 7      # Outcome Date GA Lbr Alberto/2nd Weight Sex Delivery Anes PTL Lv   8 Term 18 38w3d 03:02 / 00:07 3.92 kg (8 lb 10.3 oz) F Vag-Spont EPI N SILVERIO   7 SAB  9w0d    SAB      6 Term 05/15/17 39w0d  3.175 kg (7 lb) F Vag-Spont  N SILVERIO      Birth Comments: [facility]   5 Term 06/03/10 40w0d  3.629 kg (8 lb) F Vag-Spont EPI N SILVERIO      Birth Comments: GDM A2, had anxiety.   4 Term 09 40w0d  3.629 kg (8 lb) F Vag-Spont EPI N SILVERIO      Birth Comments: Pt states had a high risk pregnancy, GDM A2   3 Term 05 41w0d   M Vag-Spont EPI N SILVERIO      Birth Comments: Pt states was induced. Pt. states son is disabled.    2 Term 04 40w0d  3.629 kg (8 lb) M Vag-Spont EPI N SILVERIO      Birth Comments: Pt states no complications.    1 Term 08/15/97 40w0d  3.629 kg (8 lb) F Vag-Spont EPI N SILVERIO      Birth Comments: Pt states no complications.           Peanut (diagnostic)  Patient Active Problem List    Diagnosis Date Noted   •  (normal spontaneous vaginal delivery) 2018   • Hemiplegic migraine without status migrainosus, not intractable 2018   • Hx of TIA vs stroke  without residual deficits 2018    • Degeneration of lumbar intervertebral disc 2017   • Insulin controlled gestational diabetes mellitus (GDM) in third trimester 2017   • Non-compliant patient 2017   • Chronic low back pain with sciatica 2017   • Supervision of high risk pregnancy, antepartum 2016   • History of gestational diabetes in prior pregnancy, currently pregnant 2016        Hospital Course:   38 y.o. , now para 8, was admitted with the above mentioned diagnosis, underwent Active Labor, vaginal, spontaneous. Patient postpartum course was unremarkable, with progressive advancement in diet , ambulation and toleration of oral analgesia. Patient without complaints today and desires discharge.      Vitals:    18 0000 18 0400 18 0800 18 2100   BP: (!) 98/67 119/84 105/69 117/74   Pulse: (!) 108 92 98 86   Resp:    Temp: 36.7 °C (98 °F) 36.3 °C (97.3 °F) 36.6 °C (97.9 °F) 36.1 °C (97 °F)   TempSrc: Temporal Temporal Temporal Temporal   SpO2: 96% 98% 98% 94%   Weight:       Height:           Current Facility-Administered Medications   Medication Dose   • LR infusion     • docusate sodium (COLACE) capsule 100 mg  100 mg   • bisacodyl (DULCOLAX) suppository 10 mg  10 mg   • magnesium hydroxide (MILK OF MAGNESIA) suspension 30 mL  30 mL   • prenatal plus vitamin (STUARTNATAL 1+1) 27-1 MG tablet 1 Tab  1 Tab   • ibuprofen (MOTRIN) tablet 600 mg  600 mg   • acetaminophen (TYLENOL) tablet 500 mg  500 mg   • diphenhydrAMINE (BENADRYL) tablet/capsule 25 mg  25 mg   • oxyCODONE-acetaminophen (PERCOCET) 5-325 MG per tablet 1 Tab  1 Tab       Exam:  Breast Exam: negative  Abdomen: Abdomen soft, non-tender. BS normal. No masses,  No organomegaly  Fundus Non Tender: yes  Incision: none  Perineum: perineum intact  Extremity: extremities, peripheral pulses and reflexes normal     Labs:  Recent Labs      18   1531  18   0541   WBC  10.7  14.4*   RBC  4.70  3.93*   HEMOGLOBIN   15.0  12.5   HEMATOCRIT  43.2  36.6*   MCV  91.9  93.1   MCH  31.9  31.8   MCHC  34.7  34.2   RDW  46.5  46.9   PLATELETCT  228  188   MPV  12.6  12.5        Activity:   Discharge to home  Pelvic Rest x 6 weeks    Assessment:  normal postpartum course  Discharge Assessment: No areas of skin breakdown/redness; surgical incision intact/healing     Follow up: .[facility] or [facility] in 5 weeks for vaginal ; 1 week for incision check.      Discharge Meds:   Current Outpatient Prescriptions   Medication Sig Dispense Refill   • ibuprofen (MOTRIN) 600 MG Tab Take 1 Tab by mouth every 6 hours as needed for Mild Pain or Moderate Pain. 30 Tab 1   • docusate sodium 100 MG Cap Take 100 mg by mouth 2 times a day as needed for Constipation. 60 Cap 0   • prenatal plus vitamin (STUARTNATAL 1+1) 27-1 MG Tab tablet Take 1 Tab by mouth every morning. 30 Tab 9     Patient states no needs for narcotics at discharge. Desires prenatal vitamin and Ibuprofen. Ultimately desires IUD.       JASON ShafferP.

## 2018-11-30 NOTE — DISCHARGE INSTRUCTIONS
POSTPARTUM DISCHARGE INSTRUCTIONS FOR MOM    YOB: 1980   Age: 38 y.o.               Admit Date: 2018     Discharge Date: 2018  Attending Doctor:  JOCY Villalobos*                  Allergies:  Peanut (diagnostic)    Discharged to home by car. Discharged via wheelchair, hospital escort: Yes.  Special equipment needed: Not Applicable  Belongings with: Personal  Be sure to schedule a follow-up appointment with your primary care doctor or any specialists as instructed.     Discharge Plan:   Influenza Vaccine Indication: Indicated: 9 to 64 years of age    REASONS TO CALL YOUR OBSTETRICIAN:  1.   Persistent fever or shaking chills (Temperature higher than 100.4)  2.   Heavy bleeding (soaking more than 1 pad per hour); Passing clots  3.   Foul odor from vagina  4.   Mastitis (Breast infection; breast pain, chills, fever, redness)  5.   Urinary pain, burning or frequency  6.   Episiotomy infection  7.   Abdominal incision infection  8.   Severe depression longer than 24 hours    HAND WASHING  · Prior to handling the baby.  · Before breastfeeding or bottle feeding baby.  · After using the bathroom or changing the baby's diaper.    WOUND CARE  Ask your physician for additional care instructions.  In general:    ·  Incision:      · Keep clean and dry.    · Do NOT lift anything heavier than your baby for up to 6 weeks.    · There should not be any opening or pus.      VAGINAL CARE  · Nothing inside vagina for 6 weeks: no sexual intercourse, tampons or douching.  · Bleeding may continue for 2-4 weeks.  Amount may vary.    · Call your physician for heavy bleeding which means soaking more than 1 pad per hour    BIRTH CONTROL  · It is possible to become pregnant at any time after delivery and while breastfeeding.  · Plan to discuss a method of birth control with your physician at your follow up visit. visit.    DIET AND ELIMINATION  · Eating more fiber (bran cereal, fruits, and vegetables)  "and drinking plenty of fluids will help to avoid constipation.  · Urinary frequency after childbirth is normal.    POSTPARTUM BLUES  During the first few days after birth, you may experience a sense of the \"blues\" which may include impatience, irritability or even crying.  These feeling come and go quickly.  However, as many as 1 in 10 women experience emotional symptoms known as postpartum depression.    Postpartum depression:  May start as early as the second or third day after delivery or take several weeks or months to develop.  Symptoms of \"blues\" are present, but are more intense:  Crying spells; loss of appetite; feelings of hopelessness or loss of control; fear of touching the baby; over concern or no concern at all about the baby; little or no concern about your own appearance/caring for yourself; and/or inability to sleep or excessive sleeping.  Contact your physician if you are experiencing any of these symptoms.    Crisis Hotline:  · [place] Crisis Hotline:  7-490-DDWEOET  Or 1-912.772.1291  · [place] Crisis Hotline:  1-520.914.9248  Or 559-401-4654    PREVENTING SHAKEN BABY:  If you are angry or stressed, PUT THE BABY IN THE CRIB, step away, take some deep breaths, and wait until you are calm to care for the baby.  DO NOT SHAKE THE BABY.  You are not alone, call a supporter for help.    · Crisis Call Center 24/7 crisis line 809-523-6081 or 1-516.495.3645  · You can also text them, text \"ANSWER\" to 735385    QUIT SMOKING/TOBACCO USE:  I understand the use of any tobacco products increases my chance of suffering from future heart disease and could cause other illnesses which may shorten my life. Quitting the use of tobacco products is the single most important thing I can do to improve my health. For further information on smoking / tobacco cessation call a Toll Free Quit Line at 1-572.787.9911 (*National Cancer [place]) or 1-148.189.6890 (American Lung Association) or you can access the web based " program at www.lungusa.org.    · [place] Tobacco Users Help Line:  (902) 687-9555       Toll Free: 1-819.746.8313  · Quit Tobacco Program [facility] (589)058-5150    DEPRESSION / SUICIDE RISK:  As you are discharged from this [facility], it is important to learn how to keep safe from harming yourself.    Recognize the warning signs:  · Abrupt changes in personality, positive or negative- including increase in energy   · Giving away possessions  · Change in eating patterns- significant weight changes-  positive or negative  · Change in sleeping patterns- unable to sleep or sleeping all the time   · Unwillingness or inability to communicate  · Depression  · Unusual sadness, discouragement and loneliness  · Talk of wanting to die  · Neglect of personal appearance   · Rebelliousness- reckless behavior  · Withdrawal from people/activities they love  · Confusion- inability to concentrate     If you or a loved one observes any of these behaviors or has concerns about self-harm, here's what you can do:  · Talk about it- your feelings and reasons for harming yourself  · Remove any means that you might use to hurt yourself (examples: pills, rope, extension cords, firearm)  · Get professional help from the community (Mental Health, Substance Abuse, psychological counseling)  · Do not be alone:Call your Safe Contact- someone whom you trust who will be there for you.  · Call your local CRISIS HOTLINE 226-7435 or 782-120-0883  · Call your local Children's Mobile Crisis Response Team [place] (698) 713-6163 or www.[employer]  · Call the toll free National Suicide Prevention Hotlines   · National Suicide Prevention Lifeline 754-541-XAAG (2291)  · National Hope Line Network 800-SUICIDE (117-9279)    DISCHARGE SURVEY:  Thank you for choosing [facility].  We hope we provided you with very good care.  You may be receiving a survey in the mail.  Please fill it out.  Your opinion is  valuable to us.    ADDITIONAL EDUCATIONAL MATERIALS GIVEN TO PATIENT:        My signature on this form indicates that:  1.  I have reviewed and understand the above information  2.  My questions regarding this information have been answered to my satisfaction.  3.  I have formulated a plan with my discharge nurse to obtain my prescribed medication for home.

## 2018-11-30 NOTE — CARE PLAN
Problem: Altered physiologic condition related to immediate post-delivery state and potential for bleeding/hemorrhage  Goal: Patient physiologically stable as evidenced by normal lochia, palpable uterine involution and vital signs within normal limits  Outcome: PROGRESSING AS EXPECTED  Fundus firm, lochia scant. Vital signs stable.     Problem: Alteration in comfort related to episiotomy, vaginal repair and/or after birth pains  Goal: Patient verbalizes acceptable pain level  Outcome: PROGRESSING AS EXPECTED  Patient will call to request pain medication as needed.

## 2018-12-10 ENCOUNTER — TELEPHONE (OUTPATIENT)
Dept: OBGYN | Facility: CLINIC | Age: 38
End: 2018-12-10

## 2018-12-10 NOTE — TELEPHONE ENCOUNTER
Pt called stating she is having some vaginal pain. When she urinates it burns and thinks she may have ripped during delivery. She has been doubling up on her Ibuprofen for the pain. And wants to know what she can do for pain management. Per consult with Dr. Hansen, pt to schedule appointment for further evaluation.

## 2019-01-07 ENCOUNTER — GYNECOLOGY VISIT (OUTPATIENT)
Dept: OBGYN | Facility: CLINIC | Age: 39
End: 2019-01-07
Payer: MEDICAID

## 2019-01-07 VITALS — DIASTOLIC BLOOD PRESSURE: 80 MMHG | BODY MASS INDEX: 36.63 KG/M2 | WEIGHT: 233.9 LBS | SYSTOLIC BLOOD PRESSURE: 122 MMHG

## 2019-01-07 DIAGNOSIS — Z32.02 PREGNANCY EXAMINATION OR TEST, NEGATIVE RESULT: ICD-10-CM

## 2019-01-07 DIAGNOSIS — Z30.014 ENCOUNTER FOR INITIAL PRESCRIPTION OF INTRAUTERINE CONTRACEPTIVE DEVICE (IUD): Primary | ICD-10-CM

## 2019-01-07 DIAGNOSIS — Z30.430 ENCOUNTER FOR INSERTION OF MIRENA IUD: ICD-10-CM

## 2019-01-07 LAB
INT CON NEG: NEGATIVE
INT CON POS: POSITIVE
POC URINE PREGNANCY TEST: NEGATIVE

## 2019-01-07 PROCEDURE — 81025 URINE PREGNANCY TEST: CPT | Performed by: NURSE PRACTITIONER

## 2019-01-07 PROCEDURE — 58300 INSERT INTRAUTERINE DEVICE: CPT | Performed by: NURSE PRACTITIONER

## 2019-01-07 ASSESSMENT — ENCOUNTER SYMPTOMS
EYES NEGATIVE: 1
CONSTITUTIONAL NEGATIVE: 1
CARDIOVASCULAR NEGATIVE: 1
PSYCHIATRIC NEGATIVE: 1
MUSCULOSKELETAL NEGATIVE: 1
RESPIRATORY NEGATIVE: 1
GASTROINTESTINAL NEGATIVE: 1
NEUROLOGICAL NEGATIVE: 1

## 2019-01-07 NOTE — PROGRESS NOTES
Subjective:      Annabel Bethea is a 38 y.o. female who presents for contraceptive management. Annabel is having an IUD placed today. Her UPT is negative.     IUD: Mirena is choice:    Today the patient is counseled on the risks of IUD insertion. Specifically discussed were alternative forms of birth control. I also discussed with the patient the risk of infection on insertion, and had asked the patient to remain on pelvic rest for one week following the insertion. We also discussed the risk of IUD expulsion, the risk of uterine perforation and IUD migration. If the IUD does migrate the patient may require a separate procedure such as a laparoscopy to retrieve the migrated IUD. I also discussed the 1% risk of pregnancy with IUD use. I also discussed the side effects of Mirena IUD which can be amenorrhea or dysfunctional uterine bleeding or spotting.  Patient had the opportunity to ask questions regarding insertion, risks and benefits, all questions are answered in their entirety.  Informed consent is signed    Procedure note  Urine pregnancy test is negative, informed consent was previously signed  The bimanual exam is performed the uterus is noted to be 7.5 weeks in size and is mid position  A speculum was inserted into the vagina, the cervix was cleansed with Betadine swabs x3  Tenaculum was placed on the anterior lip of the cervix at 11:00 and 1:00   The uterus was sounded to 7.5 centimeters  The IUD is placed under sterile conditions: no complications  The strings trimmed to approximately 3 cm  Tenaculum was removed from the cervix and hemostasis was achieved with pressure only  The patient tolerated the procedure well      Lot:[id number]  Exp: Apr 2021      Patient is asked to followup in 4-6 weeks for IUD check. The patient is asked to remain on pelvic rest for 2-3 days.  Use a backup method for 7 days, condoms. She is asked to return sooner than 4-6 weeks for heavy vaginal bleeding uncontrolled pain or  fever          HPI    Review of Systems   Constitutional: Negative.    HENT: Negative.    Eyes: Negative.    Respiratory: Negative.    Cardiovascular: Negative.    Gastrointestinal: Negative.    Genitourinary: Negative.    Musculoskeletal: Negative.    Skin: Negative.    Neurological: Negative.    Endo/Heme/Allergies: Negative.    Psychiatric/Behavioral: Negative.           Objective:     /80   Wt 106.1 kg (233 lb 14.4 oz)   LMP 07/12/2017   BMI 36.63 kg/m²      Physical Exam   Constitutional: She is oriented to person, place, and time. She appears well-developed and well-nourished.   Abdominal: Soft.   Genitourinary: Vagina normal and uterus normal. Rectal exam shows no tenderness. Pelvic exam was performed with patient supine. No labial fusion. There is no rash, tenderness, lesion or injury on the right labia. There is no rash, tenderness, lesion or injury on the left labia. No erythema, tenderness or bleeding in the vagina. No signs of injury around the vagina. No vaginal discharge found.   Neurological: She is alert and oriented to person, place, and time.   Skin: Skin is warm and dry.   Psychiatric: She has a normal mood and affect. Her behavior is normal. Judgment and thought content normal.               Assessment/Plan:     1. Encounter for insertion of mirena IUD  -Return to clinic or go to the ER if any of the following occur in the next few days to weeks:  Foul smelling discharge  Fever over 100.0  Strong abdominal pains and or cramps not relieved by Tylenol or Advil  Heavy bleeding saturating a pad or more in a 1 hour time frame    - Consent for Surgery / Procedure  - POCT Pregnancy    2. Pregnancy examination or test, negative result    - POCT Pregnancy

## 2019-01-07 NOTE — PROGRESS NOTES
Pt here for Mirena insertion   # [phone]  Pt states no complaints.   Pregnancy test in clinic : Negative

## 2019-02-13 ENCOUNTER — TELEPHONE (OUTPATIENT)
Dept: MEDICAL GROUP | Facility: MEDICAL CENTER | Age: 39
End: 2019-02-13

## 2019-02-13 NOTE — TELEPHONE ENCOUNTER
Left message with patient about no show to appointment today 2/13/19.  Explained that this was her first no show and the no show policy.

## 2019-12-10 NOTE — IP AVS SNAPSHOT
Home Care Instructions                                                                                                                Annabel Bethea   MRN: [id number]    Department:  POST PARTUM 31   5/15/2017           Your appointments     2017  2:30 PM   Post Partum with DG Hernandez.   [facility] ([facility])    [address]  [place] [address]   [phone]              Follow-up Information     1. Follow up with [facility]. Go in 6 weeks.    Specialty:  OB/Gyn    Why:  follow up appointment    Contact information    [address]  [place] [address]  [phone]    Additional information:    From  [address], take the [address] exit [address]) and head West on [address].   Just before [facility] [facility] [place], take the slight left fork onto [address].   [facility] is located on the right hand side, just past [facility] [address] [facility].       I assume responsibility for securing a follow-up [place] Screening blood test on my baby within the specified date range.    -                  Discharge Instructions       POSTPARTUM DISCHARGE INSTRUCTIONS FOR MOM    YOB: 1980   Age: 36 y.o.               Admit Date: 5/15/2017     Discharge Date: 2017  Attending Doctor:  Theo Song M.D.                  Allergies:  Peanut-derived    Discharged to home by car. Discharged via wheelchair, hospital escort: Yes.  Special equipment needed: Not Applicable  Belongings with: Personal  Be sure to schedule a follow-up appointment with your primary care doctor or any specialists as instructed.     Discharge Plan:   Diet Plan: Discussed  Activity Level: Discussed  Smoking Cessation Offered: Patient Counseled  Confirmed Follow up Appointment: Patient to Call and Schedule Appointment  Confirmed Symptoms Management: Discussed  Medication Reconciliation Updated: No (Comments)  Influenza Vaccine Indication: Not indicated: Previously immunized this influenza season and > 8 years  Detail Level: Zone "of age    REASONS TO CALL YOUR OBSTETRICIAN:  1.   Persistent fever or shaking chills (Temperature higher than 100.4)  2.   Heavy bleeding (soaking more than 1 pad per hour); Passing clots  3.   Foul odor from vagina  4.   Mastitis (Breast infection; breast pain, chills, fever, redness)  5.   Urinary pain, burning or frequency  6.   Episiotomy infection  7.   Abdominal incision infection  8.   Severe depression longer than 24 hours    HAND WASHING  · Prior to handling the baby.  · Before breastfeeding or bottle feeding baby.  · After using the bathroom or changing the baby's diaper.    WOUND CARE  Ask your physician for additional care instructions.  In general:    ·  Incision:      · Keep clean and dry.    · Do NOT lift anything heavier than your baby for up to 6 weeks.    · There should not be any opening or pus.      VAGINAL CARE  · Nothing inside vagina for 6 weeks: no sexual intercourse, tampons or douching.  · Bleeding may continue for 2-4 weeks.  Amount may vary.    · Call your physician for heavy bleeding which means soaking more than 1 pad per hour    BIRTH CONTROL  · It is possible to become pregnant at any time after delivery and while breastfeeding.  · Plan to discuss a method of birth control with your physician at your follow up visit. visit.    DIET AND ELIMINATION  · Eating more fiber (bran cereal, fruits, and vegetables) and drinking plenty of fluids will help to avoid constipation.  · Urinary frequency after childbirth is normal.    POSTPARTUM BLUES  During the first few days after birth, you may experience a sense of the \"blues\" which may include impatience, irritability or even crying.  These feeling come and go quickly.  However, as many as 1 in 10 women experience emotional symptoms known as postpartum depression.    Postpartum depression:  May start as early as the second or third day after delivery or take several weeks or months to develop.  Symptoms of \"blues\" are present, but are " "more intense:  Crying spells; loss of appetite; feelings of hopelessness or loss of control; fear of touching the baby; over concern or no concern at all about the baby; little or no concern about your own appearance/caring for yourself; and/or inability to sleep or excessive sleeping.  Contact your physician if you are experiencing any of these symptoms.    Crisis Hotline:  · [place] Crisis Hotline:  6-065-NWNXMKR  Or 1-170.225.5579  · [place] Crisis Hotline:  1-217.919.8361  Or 023-486-9778    PREVENTING SHAKEN BABY:  If you are angry or stressed, PUT THE BABY IN THE CRIB, step away, take some deep breaths, and wait until you are calm to care for the baby.  DO NOT SHAKE THE BABY.  You are not alone, call a supporter for help.    · Crisis Call Center 24/7 crisis line 468-030-4921 or 1-643.219.5000  · You can also text them, text \"ANSWER\" to 438986    QUIT SMOKING/TOBACCO USE:  I understand the use of any tobacco products increases my chance of suffering from future heart disease and could cause other illnesses which may shorten my life. Quitting the use of tobacco products is the single most important thing I can do to improve my health. For further information on smoking / tobacco cessation call a Toll Free Quit Line at 1-506.754.7745 (*National Cancer [place]) or 1-167.737.7991 (American Lung Association) or you can access the web based program at www.lungusa.org.    · [place] Tobacco Users Help Line:  (288) 714-7521       Toll Free: 1-952.380.8641  · Quit Tobacco Program [facility] (329)198-8946    DEPRESSION / SUICIDE RISK:  As you are discharged from this [facility], it is important to learn how to keep safe from harming yourself.    Recognize the warning signs:  · Abrupt changes in personality, positive or negative- including increase in energy   · Giving away possessions  · Change in eating patterns- significant weight changes-  positive or negative  · Change in sleeping " patterns- unable to sleep or sleeping all the time   · Unwillingness or inability to communicate  · Depression  · Unusual sadness, discouragement and loneliness  · Talk of wanting to die  · Neglect of personal appearance   · Rebelliousness- reckless behavior  · Withdrawal from people/activities they love  · Confusion- inability to concentrate     If you or a loved one observes any of these behaviors or has concerns about self-harm, here's what you can do:  · Talk about it- your feelings and reasons for harming yourself  · Remove any means that you might use to hurt yourself (examples: pills, rope, extension cords, firearm)  · Get professional help from the community (Mental Health, Substance Abuse, psychological counseling)  · Do not be alone:Call your Safe Contact- someone whom you trust who will be there for you.  · Call your local CRISIS HOTLINE 842-9932 or 907-983-2224  · Call your local Children's Mobile Crisis Response Team [place] (299) 186-5860 or www.[employer]  · Call the toll free National Suicide Prevention Hotlines   · National Suicide Prevention Lifeline 599-451-JPSP (7204)  · National Hope Line Network 800-SUICIDE (246-1477)    DISCHARGE SURVEY:  Thank you for choosing [facility].  We hope we provided you with very good care.  You may be receiving a survey in the mail.  Please fill it out.  Your opinion is valuable to us.    ADDITIONAL EDUCATIONAL MATERIALS GIVEN TO PATIENT:        My signature on this form indicates that:  1.  I have reviewed and understand the above information  2.  My questions regarding this information have been answered to my satisfaction.  3.  I have formulated a plan with my discharge nurse to obtain my prescribed medication for home.         Discharge Medication Instructions:    Below are the medications your physician expects you to take upon discharge:    Review all your home medications and newly ordered medications with your doctor and/or pharmacist. Follow  "medication instructions as directed by your doctor and/or pharmacist.    Please keep your medication list with you and share with your physician.               Medication List      START taking these medications        Instructions    Morning Afternoon Evening Bedtime     MG Caps   Last time this was given:  100 mg on 5/17/2017  7:43 AM        Take 100 mg by mouth 2 Times a Day.   Dose:  100 mg                        ibuprofen 800 MG Tabs   Last time this was given:  800 mg on 5/17/2017  6:46 AM   Commonly known as:  MOTRIN        Take 1 Tab by mouth every 8 hours as needed (For cramping after delivery; do not give if patient is receiving ketorolac (Toradol)).   Dose:  800 mg                          CONTINUE taking these medications        Instructions    Morning Afternoon Evening Bedtime    Acetaminophen-Codeine 300-30 MG Tabs        Take 1-2 Tabs by mouth every four hours as needed.   Dose:  1-2 Tab                        gabapentin 300 MG Caps   Commonly known as:  NEURONTIN        Take 2 Caps by mouth 3 times a day.   Dose:  600 mg                        glucose blood strip   Commonly known as:  ONETOUCH VERIO        Patient to check blood sugar 4 times a day.                        insulin  UNIT/ML Susp   Commonly known as:  HUMULIN,NOVOLIN        Patient to inject 10 units of NPH insulin at bed time.                        Insulin Syringe-Needle U-100 26G X 1/2\" 1 ML Misc        1 Units by Does not apply route 3 times a day.   Dose:  1 Units                        Insulin Syringes (Disposable) U-100 0.5 ML Misc        Use 3 times daily.                        ONETOUCH DELICA LANCETS FINE Misc        Doctor's comments:  Patient to check blood sugar 4 times a day.   1 Stick by Does not apply route 4 times a day.   Dose:  1 Stick                             Where to Get Your Medications      These medications were sent to [id number] - [place], [place] - [address]  [address] [place] [address]     Phone:  [phone] - dss 100 MG Caps  - ibuprofen 800 MG Tabs            Crisis Hotline:     [place] Crisis Hotline:  0-730-UYUYJSX or 1-639.562.5837    [place] Crisis Hotline:    1-769.745.6921 or 380-950-9684        Disclaimer           _____________________________________                     __________       ________       Patient/Mother Signature or Legal                          Date                   Time

## 2020-05-29 ENCOUNTER — GYNECOLOGY VISIT (OUTPATIENT)
Dept: OBGYN | Facility: CLINIC | Age: 40
End: 2020-05-29
Payer: MEDICAID

## 2020-05-29 VITALS
HEIGHT: 67 IN | SYSTOLIC BLOOD PRESSURE: 110 MMHG | BODY MASS INDEX: 33.16 KG/M2 | WEIGHT: 211.3 LBS | DIASTOLIC BLOOD PRESSURE: 60 MMHG

## 2020-05-29 DIAGNOSIS — N76.0 ACUTE VAGINITIS: ICD-10-CM

## 2020-05-29 DIAGNOSIS — B37.31 VAGINAL CANDIDIASIS: ICD-10-CM

## 2020-05-29 PROCEDURE — 99213 OFFICE O/P EST LOW 20 MIN: CPT | Performed by: OBSTETRICS & GYNECOLOGY

## 2020-05-29 RX ORDER — FLUCONAZOLE 150 MG/1
150 TABLET ORAL DAILY
Qty: 1 TAB | Refills: 0 | Status: SHIPPED | OUTPATIENT
Start: 2020-05-29 | End: 2020-08-23

## 2020-05-29 RX ORDER — CLOTRIMAZOLE AND BETAMETHASONE DIPROPIONATE 10; .64 MG/G; MG/G
CREAM TOPICAL
Qty: 1 TUBE | Refills: 0 | Status: SHIPPED | OUTPATIENT
Start: 2020-05-29 | End: 2020-08-23

## 2020-05-29 ASSESSMENT — FIBROSIS 4 INDEX: FIB4 SCORE: 1.04

## 2020-05-29 NOTE — PROGRESS NOTES
Chief Complaint   Patient presents with   • Gynecologic Exam     Pt having vaginal Problems        History of present illness: 39 y.o. presents with irritation and vaginal discomfort for the past week or so. She states her  was in the hospital with complications from a cholecystectomy for about 2 weeks on .  She noticed irritation that day however it was not as severe.  Then 3 days ago she again had intercourse with him and the burning was so bad that she called for an emergency appointment.  She denies any discharge but states that the burning and throbbing is very uncomfortable.    Review of systems:  Pertinent positives documented in HPI and all other systems reviewed & are negative    PGYNHx: Using IUD for contraception.     POBHx:   OB History    Para Term  AB Living   8 7 7   1 7   SAB TAB Ectopic Molar Multiple Live Births   1       0 7      # Outcome Date GA Lbr Alberto/2nd Weight Sex Delivery Anes PTL Lv   8 Term 18 38w3d 03:02 / 00:07 3.92 kg (8 lb 10.3 oz) F Vag-Spont EPI N SILVERIO   7 SAB  9w0d    SAB      6 Term 05/15/17 39w0d  3.175 kg (7 lb) F Vag-Spont  N SILVERIO      Birth Comments: [facility]   5 Term 06/03/10 40w0d  3.629 kg (8 lb) F Vag-Spont EPI N SILVERIO      Birth Comments: GDM A2, had anxiety.   4 Term 09 40w0d  3.629 kg (8 lb) F Vag-Spont EPI N SILVERIO      Birth Comments: Pt states had a high risk pregnancy, GDM A2   3 Term 05 41w0d   M Vag-Spont EPI N SILVERIO      Birth Comments: Pt states was induced. Pt. states son is disabled.    2 Term 04 40w0d  3.629 kg (8 lb) M Vag-Spont EPI N SILVERIO      Birth Comments: Pt states no complications.    1 Term 08/15/97 40w0d  3.629 kg (8 lb) F Vag-Spont EPI N SILVERIO      Birth Comments: Pt states no complications.     All PMH, PSH, allergies, social history and FH reviewed and updated today:  Past Medical History:   Diagnosis Date   • Allergy    • Anxiety    • Depression    • Diabetes (HCC)     GDM last two pregnancy's   •  Hemiplegic migraine without status migrainosus, not intractable 9/26/2018   • Stroke (HCC)     was told by md that she had a mild stroke x 1 in 2015       Past Surgical History:   Procedure Laterality Date   • LUMBAR LAMINECTOMY DISKECTOMY Left 7/21/2017    Procedure: LUMBAR LAMINECTOMY DISKECTOMY - L4-5 MICRODISCECTOMY;  Surgeon: Neeraj Burrell M.D.;  Location: SURGERY [facility];  Service:    • OTHER  2009    cholecytectomy   • CHOLECYSTECTOMY         Allergies:   Allergies   Allergen Reactions   • Peanut (Diagnostic) Itching     Itchy throat       Social History     Socioeconomic History   • Marital status:      Spouse name: Not on file   • Number of children: Not on file   • Years of education: Not on file   • Highest education level: Not on file   Occupational History   • Not on file   Social Needs   • Financial resource strain: Not on file   • Food insecurity     Worry: Not on file     Inability: Not on file   • Transportation needs     Medical: Not on file     Non-medical: Not on file   Tobacco Use   • Smoking status: Former Smoker     Packs/day: 0.25     Types: Cigarettes     Last attempt to quit: 10/1/2016     Years since quitting: 3.6   • Smokeless tobacco: Former User     Quit date: 9/28/2016   • Tobacco comment: Pt. states last smoked when she found out she was pregnant.    Substance and Sexual Activity   • Alcohol use: No   • Drug use: Yes     Types: Marijuana   • Sexual activity: Yes     Partners: Male     Birth control/protection: I.U.D.     Comment: Mirena IUD on 1/7/19   Lifestyle   • Physical activity     Days per week: Not on file     Minutes per session: Not on file   • Stress: Not on file   Relationships   • Social connections     Talks on phone: Not on file     Gets together: Not on file     Attends Religion service: Not on file     Active member of club or organization: Not on file     Attends meetings of clubs or organizations: Not on file     Relationship status: Not on file  "  • Intimate partner violence     Fear of current or ex partner: Not on file     Emotionally abused: Not on file     Physically abused: Not on file     Forced sexual activity: Not on file   Other Topics Concern   • Not on file   Social History Narrative   • Not on file       Family History   Problem Relation Age of Onset   • Diabetes Paternal Grandmother    • Cancer Paternal Grandmother         breast   • Hypertension Paternal Grandmother    • Stroke Paternal Grandmother    • Other Paternal Grandmother         gout   • Arthritis Paternal Grandmother        Physical exam:  /60 (BP Location: Right arm, Patient Position: Sitting, BP Cuff Size: Adult)   Ht 1.702 m (5' 7\")   Wt 95.8 kg (211 lb 4.8 oz)     General:appears stated age, is in no apparent distress  Head: normocephalic, non-tender  Neck: neck is supple, no jugular venous distension  Female GYN: normal female external genitalia without lesions; the vulva appears irritated and slightly erythematous.  On sterile speculum exam there is a coating of cheesy cheese material around the vaginal walls.  \Skin: No rashes, or ulcers or lesions seen  Psychiatric: Patient shows appropriate affect, is alert and oriented x3, intact judgment and insight.    Assessment  1. Vaginal candidiasis       Plan  - 39 y.o.  here with above  -Advised that she does not need anything other than water to clean the vulva.  Diflucan sent to the pharmacy for her.  Also sent for Lotrisone cream to be used up to 3 times daily for comfort.  Advised patient she may also use cold water sitz bath up to 4 times a day.  -Patient is a  and states walking while this condition is active is very uncomfortable.  We will get her off for 2 days.    - Follow up PRN    "

## 2020-05-29 NOTE — NON-PROVIDER
Patient here for a GYN follow up.   Last seen on 01/07/2019  C/o  Pt states she had intercourse with her  and after she has been itching and a lot of pain.  pharmacy verified.  Patient phone #: [phone]

## 2020-05-29 NOTE — LETTER
May 29, 2020       Patient: Annabel Bethea   YOB: 1980   Date of Visit: 5/29/2020         To Whom It May Concern:    In my medical opinion, I recommend that Annabel Bethea take the next two days off from work.     If you have any questions or concerns, please don't hesitate to call [phone]          Sincerely,          Sun Negron D.O.  Electronically Signed

## 2020-06-01 ENCOUNTER — GYNECOLOGY VISIT (OUTPATIENT)
Dept: OBGYN | Facility: CLINIC | Age: 40
End: 2020-06-01
Payer: MEDICAID

## 2020-06-01 ENCOUNTER — TELEPHONE (OUTPATIENT)
Dept: OBGYN | Facility: CLINIC | Age: 40
End: 2020-06-01

## 2020-06-01 VITALS
BODY MASS INDEX: 32.02 KG/M2 | HEIGHT: 67 IN | DIASTOLIC BLOOD PRESSURE: 78 MMHG | WEIGHT: 204 LBS | SYSTOLIC BLOOD PRESSURE: 120 MMHG

## 2020-06-01 DIAGNOSIS — N89.8 VAGINAL DISCHARGE: Primary | ICD-10-CM

## 2020-06-01 PROCEDURE — 99213 OFFICE O/P EST LOW 20 MIN: CPT | Performed by: NURSE PRACTITIONER

## 2020-06-01 RX ORDER — FLUCONAZOLE 150 MG/1
150 TABLET ORAL
Qty: 2 TAB | Refills: 0 | Status: SHIPPED | OUTPATIENT
Start: 2020-06-01 | End: 2020-08-23

## 2020-06-01 ASSESSMENT — FIBROSIS 4 INDEX: FIB4 SCORE: 1.04

## 2020-06-01 NOTE — TELEPHONE ENCOUNTER
Pt called c/o vaginal irritation, pt states she has taken Diflucan and has been using Lotrisone but has not had any relief. Offered appt, pt agreed and scheduled at 1600 today

## 2020-06-01 NOTE — PROGRESS NOTES
HPI Comments:  Annabel Bethea is a 39 y.o. y.o. female who presents for problem gyn visit: Pt reports continued pain and swelling in vagina and experiencing painful sex. She was seen on 5/29/20 by Dr. Negron and prescribed Diflucan x 1 dose and Lotrisone cream. She reports no improvement in symptoms in fact vaginal cream made things worse. No LMP recorded.    .  Review of Systems :  Constitutional: normal  EENT: no  Cardio: no  Resp: no  GI: no  : pain and swelling in vagina along with painful sex.  Pertinent positives documented in HPI and all other systems reviewed & are negative    All PMH, PSH, allergies, social history and FH reviewed and updated today:  Past Medical History:   Diagnosis Date   • Allergy    • Anxiety 2010   • Depression    • Diabetes (HCC)     GDM last two pregnancy's   • Hemiplegic migraine without status migrainosus, not intractable 9/26/2018   • Stroke (HCC)     was told by md that she had a mild stroke x 1 in 2015     Past Surgical History:   Procedure Laterality Date   • LUMBAR LAMINECTOMY DISKECTOMY Left 7/21/2017    Procedure: LUMBAR LAMINECTOMY DISKECTOMY - L4-5 MICRODISCECTOMY;  Surgeon: Neeraj Burrell M.D.;  Location: SURGERY [facility];  Service:    • OTHER  2009    cholecytectomy   • CHOLECYSTECTOMY       Peanut (diagnostic)  Social History     Socioeconomic History   • Marital status:      Spouse name: Not on file   • Number of children: Not on file   • Years of education: Not on file   • Highest education level: Not on file   Occupational History   • Not on file   Social Needs   • Financial resource strain: Not on file   • Food insecurity     Worry: Not on file     Inability: Not on file   • Transportation needs     Medical: Not on file     Non-medical: Not on file   Tobacco Use   • Smoking status: Former Smoker     Packs/day: 0.25     Types: Cigarettes     Last attempt to quit: 10/1/2016     Years since quitting: 3.6   • Smokeless tobacco: Former User     Quit  date: 9/28/2016   • Tobacco comment: Pt. states last smoked when she found out she was pregnant.    Substance and Sexual Activity   • Alcohol use: No   • Drug use: Yes     Types: Marijuana   • Sexual activity: Yes     Partners: Male     Birth control/protection: I.U.D.     Comment: Mirena IUD on 1/7/19   Lifestyle   • Physical activity     Days per week: Not on file     Minutes per session: Not on file   • Stress: Not on file   Relationships   • Social connections     Talks on phone: Not on file     Gets together: Not on file     Attends Orthodox service: Not on file     Active member of club or organization: Not on file     Attends meetings of clubs or organizations: Not on file     Relationship status: Not on file   • Intimate partner violence     Fear of current or ex partner: Not on file     Emotionally abused: Not on file     Physically abused: Not on file     Forced sexual activity: Not on file   Other Topics Concern   • Not on file   Social History Narrative   • Not on file     Family History   Problem Relation Age of Onset   • Diabetes Paternal Grandmother    • Cancer Paternal Grandmother         breast   • Hypertension Paternal Grandmother    • Stroke Paternal Grandmother    • Other Paternal Grandmother         gout   • Arthritis Paternal Grandmother      Medications:   Current Outpatient Medications Ordered in Epic   Medication Sig Dispense Refill   • fluconazole (DIFLUCAN) 150 MG tablet Take 1 Tab by mouth every 3 days. Take second dose 3 days after taking the first dose 2 Tab 0   • fluconazole (DIFLUCAN) 150 MG tablet Take 1 Tab by mouth every day. (Patient not taking: Reported on 6/1/2020) 1 Tab 0   • clotrimazole-betamethasone (LOTRISONE) 1-0.05 % Cream Thin layer to outside only for 3 days (Patient not taking: Reported on 6/1/2020) 1 Tube 0   • levonorgestrel (MIRENA, 52 MG,) 20 MCG/24HR IUD 1 Each by Intrauterine route Once.     • ibuprofen (MOTRIN) 600 MG Tab Take 1 Tab by mouth every 6 hours as  "needed for Mild Pain or Moderate Pain. (Patient not taking: Reported on 5/29/2020) 30 Tab 1   • docusate sodium 100 MG Cap Take 100 mg by mouth 2 times a day as needed for Constipation. (Patient not taking: Reported on 5/29/2020) 60 Cap 0   • prenatal plus vitamin (STUARTNATAL 1+1) 27-1 MG Tab tablet Take 1 Tab by mouth every morning. (Patient not taking: Reported on 5/29/2020) 30 Tab 9   • Insulin Syringes U-100 0.5 mL Pt to check glucose levels four times a day. (Patient not taking: Reported on 5/29/2020) 120 Each 1   • Insulin Syringe 27G X 1/2\" 0.5 ML Misc 1 Syringe by Does not apply route every bedtime. (Patient not taking: Reported on 5/29/2020) 100 Each 1   • ONETOUCH DELICA LANCETS FINE Misc 1 Stick by Does not apply route 4 times a day. (Patient not taking: Reported on 5/29/2020) 100 Each 2   • Prenatal MV-Min-Fe Fum-FA-DHA (PRENATAL 1 PO) Take  by mouth.       No current Epic-ordered facility-administered medications on file.           Objective:   Vital measurements:  /78 (BP Location: Left arm, Patient Position: Sitting)   Ht 1.702 m (5' 7\")   Wt 92.5 kg (204 lb)   Body mass index is 31.95 kg/m². (Goal BM I>18 <25)    GCCT neg  Vaginal pathogen specimen unacceptable, not run    Physical Exam   Nursing note and vitals reviewed.  Constitutional: She is oriented to person, place, and time. She appears well-developed and well-nourished. No distress.     Abdominal: Soft. Bowel sounds are normal. She exhibits no distension and no mass. No tenderness. She has no rebound and no guarding.     Genitourinary:  Pelvic exam was performed with patient supine.  External genitalia with no abnormal pigmentation, labial fusion,rash, tenderness, lesion or injury to the labia bilaterally.  Vagina is moist with no lesions, no foul discharge, erythema present, no tenderness or bleeding. No foreign body around the vagina or signs of injury.     Wet mount: negative yeast, no clue cells, no whiff    Neurological: She is " alert and oriented to person, place, and time. She exhibits normal muscle tone.     Skin: Skin is warm and dry. No rash noted. She is not diaphoretic. No erythema. No pallor.     Psychiatric: She has a normal mood and affect. Her behavior is normal. Judgment and thought content normal.        Assessment:     1. Vaginal discharge  VAGINAL PATHOGENS DNA PANEL         Plan:   Gyn exam performed   Counseling:no heavily perfumed soaps or vaginal sprays, no scented or flavored lubricants, no douching.   Encourage exercise and proper diet.  Rx for Diflucan x 2 doses 3 days apart  Vaginal pathogen obtained and resent    No follow-ups on file.

## 2020-06-01 NOTE — NON-PROVIDER
Patient here c/o vaginal irritation, states she use Diflucan. Vaginal burning after sexual intercourse  Phone number: [phone]  Pharmacy verified

## 2020-06-05 DIAGNOSIS — N76.0 ACUTE VAGINITIS: ICD-10-CM

## 2020-07-27 ENCOUNTER — HOSPITAL ENCOUNTER (OUTPATIENT)
Dept: RADIOLOGY | Facility: MEDICAL CENTER | Age: 40
End: 2020-07-27
Payer: MEDICAID

## 2020-07-27 DIAGNOSIS — Z12.31 VISIT FOR SCREENING MAMMOGRAM: ICD-10-CM

## 2020-08-23 ENCOUNTER — OFFICE VISIT (OUTPATIENT)
Dept: URGENT CARE | Facility: CLINIC | Age: 40
End: 2020-08-23
Payer: MEDICAID

## 2020-08-23 VITALS
HEIGHT: 67 IN | TEMPERATURE: 97.9 F | WEIGHT: 185 LBS | SYSTOLIC BLOOD PRESSURE: 126 MMHG | BODY MASS INDEX: 29.03 KG/M2 | OXYGEN SATURATION: 98 % | DIASTOLIC BLOOD PRESSURE: 82 MMHG | RESPIRATION RATE: 12 BRPM | HEART RATE: 92 BPM

## 2020-08-23 DIAGNOSIS — R10.9 ABDOMINAL PAIN, RIGHT LATERAL: ICD-10-CM

## 2020-08-23 DIAGNOSIS — R10.9 FLANK PAIN: ICD-10-CM

## 2020-08-23 LAB
APPEARANCE UR: NORMAL
BILIRUB UR STRIP-MCNC: NEGATIVE MG/DL
COLOR UR AUTO: NORMAL
GLUCOSE UR STRIP.AUTO-MCNC: NEGATIVE MG/DL
INT CON NEG: NEGATIVE
INT CON POS: POSITIVE
KETONES UR STRIP.AUTO-MCNC: NEGATIVE MG/DL
LEUKOCYTE ESTERASE UR QL STRIP.AUTO: NORMAL
NITRITE UR QL STRIP.AUTO: POSITIVE
PH UR STRIP.AUTO: 5 [PH] (ref 5–8)
POC URINE PREGNANCY TEST: NEGATIVE
PROT UR QL STRIP: NEGATIVE MG/DL
RBC UR QL AUTO: NORMAL
SP GR UR STRIP.AUTO: 1.03
UROBILINOGEN UR STRIP-MCNC: 1 MG/DL

## 2020-08-23 PROCEDURE — 81002 URINALYSIS NONAUTO W/O SCOPE: CPT | Performed by: FAMILY MEDICINE

## 2020-08-23 PROCEDURE — 81025 URINE PREGNANCY TEST: CPT | Performed by: FAMILY MEDICINE

## 2020-08-23 PROCEDURE — 99203 OFFICE O/P NEW LOW 30 MIN: CPT | Mod: 25 | Performed by: FAMILY MEDICINE

## 2020-08-23 RX ORDER — KETOROLAC TROMETHAMINE 30 MG/ML
30 INJECTION, SOLUTION INTRAMUSCULAR; INTRAVENOUS ONCE
Status: COMPLETED | OUTPATIENT
Start: 2020-08-23 | End: 2020-08-23

## 2020-08-23 RX ORDER — KETOROLAC TROMETHAMINE 30 MG/ML
30 INJECTION, SOLUTION INTRAMUSCULAR; INTRAVENOUS ONCE
Status: DISCONTINUED | OUTPATIENT
Start: 2020-08-23 | End: 2020-08-23

## 2020-08-23 RX ADMIN — KETOROLAC TROMETHAMINE 30 MG: 30 INJECTION, SOLUTION INTRAMUSCULAR; INTRAVENOUS at 12:09

## 2020-08-23 ASSESSMENT — FIBROSIS 4 INDEX: FIB4 SCORE: 1.06

## 2020-08-23 NOTE — PROGRESS NOTES
"Subjective:      Annabel Bethea is a 40 y.o. female who presents with Abdominal Pain (with right and back pain , headache, feels dehydrated )            This is a new problem.  40-year-old presenting for evaluation of worsening right-sided abdominal pain and flank pain for the past couple weeks, also feeling poorly at times.  She denies any nausea or vomiting or fever.  She denies any urinary frequency, urgency or dysuria.  She has noticed her urine is darker.  No history of kidney stone or kidney infection reported.  Appetite is okay.  She has been feeling at times dehydrated.  She has had intermittent headache as well.  At times she has felt kind of weak and dizzy but not at the present time.  She is starting a new job at [employer] tomorrow.  She rates her flank pain as 7 out of 10 in intensity.      Review of Systems   All other systems reviewed and are negative.         Objective:     /82 (BP Location: Left arm, Patient Position: Sitting, BP Cuff Size: Large adult long)   Pulse 92   Temp 36.6 °C (97.9 °F) (Temporal)   Resp 12   Ht 1.702 m (5' 7\")   Wt 83.9 kg (185 lb)   SpO2 98%   Breastfeeding No Comment: IUD   BMI 28.98 kg/m²      Physical Exam  Constitutional:       General: She is not in acute distress.     Appearance: She is not ill-appearing, toxic-appearing or diaphoretic.   HENT:      Head: Normocephalic and atraumatic.      Right Ear: External ear normal.      Left Ear: External ear normal.   Eyes:      Conjunctiva/sclera: Conjunctivae normal.   Cardiovascular:      Rate and Rhythm: Normal rate and regular rhythm.      Heart sounds: No murmur. No gallop.    Pulmonary:      Effort: Pulmonary effort is normal. No respiratory distress.      Breath sounds: No stridor. No wheezing, rhonchi or rales.   Abdominal:      General: There is no distension.      Palpations: Abdomen is soft. There is no mass.      Tenderness: There is abdominal tenderness in the right upper quadrant and right lower " quadrant. There is right CVA tenderness and guarding. There is no left CVA tenderness or rebound.      Hernia: No hernia is present.       Skin:     General: Skin is warm.      Coloration: Skin is not jaundiced or pale.      Findings: No erythema or rash.   Neurological:      Mental Status: She is alert and oriented to person, place, and time.   Psychiatric:         Mood and Affect: Mood normal.             Results for orders placed or performed in visit on 01/07/19   POCT Pregnancy   Result Value Ref Range    POC Urine Pregnancy Test Negative Negative    Internal Control Positive Positive     Internal Control Negative Negative        Urine is abnormal and suggestive of a urinary tract infection       Assessment/Plan:        1. Abdominal pain, right lateral  - POCT Urinalysis  - POCT Pregnancy  - cefTRIAXone (ROCEPHIN) 1 g, lidocaine (XYLOCAINE) 1 % 3.6 mL for IM use  - Orthostatic Blood Pressure  - ketorolac (TORADOL) injection 30 mg  - levonorgestrel (MIRENA, 52 MG,) 52 mg (20 mcg/24 hr) IUD; 1 Intra Uterine Device by Intrauterine route Once for 1 dose.  Dispense: 1 Intra Uterine Device; Refill: 0       2. Flank pain  - POCT Urinalysis  - POCT Pregnancy  - cefTRIAXone (ROCEPHIN) 1 g, lidocaine (XYLOCAINE) 1 % 3.6 mL for IM use  - Orthostatic Blood Pressure  - ketorolac (TORADOL) injection 30 mg  - levonorgestrel (MIRENA, 52 MG,) 52 mg (20 mcg/24 hr) IUD; 1 Intra Uterine Device by Intrauterine route Once for 1 dose.  Dispense: 1 Intra Uterine Device; Refill: 0      She she definitely has pyelonephritis but based on her abdominal exam and the fact that she has been feeling poorly for the past couple of weeks I cannot rule out any perinephric abscess.  Recommended for immediate evaluation in the emergency department setting  Plan per orders and instructions  Warning signs reviewed

## 2021-01-08 ENCOUNTER — HOSPITAL ENCOUNTER (OUTPATIENT)
Dept: LAB | Facility: MEDICAL CENTER | Age: 41
End: 2021-01-08
Attending: NURSE PRACTITIONER
Payer: COMMERCIAL

## 2021-01-08 ENCOUNTER — OFFICE VISIT (OUTPATIENT)
Dept: URGENT CARE | Facility: CLINIC | Age: 41
End: 2021-01-08
Payer: COMMERCIAL

## 2021-01-08 VITALS
DIASTOLIC BLOOD PRESSURE: 74 MMHG | RESPIRATION RATE: 20 BRPM | TEMPERATURE: 98.5 F | HEART RATE: 87 BPM | OXYGEN SATURATION: 99 % | HEIGHT: 67 IN | WEIGHT: 215 LBS | SYSTOLIC BLOOD PRESSURE: 130 MMHG | BODY MASS INDEX: 33.74 KG/M2

## 2021-01-08 DIAGNOSIS — R11.0 NAUSEA: ICD-10-CM

## 2021-01-08 DIAGNOSIS — N92.6 IRREGULAR MENSES: ICD-10-CM

## 2021-01-08 DIAGNOSIS — R42 DIZZINESS: ICD-10-CM

## 2021-01-08 LAB
ALBUMIN SERPL BCP-MCNC: 4.4 G/DL (ref 3.2–4.9)
ALBUMIN/GLOB SERPL: 1.6 G/DL
ALP SERPL-CCNC: 71 U/L (ref 30–99)
ALT SERPL-CCNC: 11 U/L (ref 2–50)
ANION GAP SERPL CALC-SCNC: 11 MMOL/L (ref 7–16)
AST SERPL-CCNC: 17 U/L (ref 12–45)
BASOPHILS # BLD AUTO: 1 % (ref 0–1.8)
BASOPHILS # BLD: 0.07 K/UL (ref 0–0.12)
BILIRUB SERPL-MCNC: 0.9 MG/DL (ref 0.1–1.5)
BUN SERPL-MCNC: 11 MG/DL (ref 8–22)
CALCIUM SERPL-MCNC: 9.1 MG/DL (ref 8.5–10.5)
CHLORIDE SERPL-SCNC: 101 MMOL/L (ref 96–112)
CO2 SERPL-SCNC: 22 MMOL/L (ref 20–33)
CREAT SERPL-MCNC: 0.7 MG/DL (ref 0.5–1.4)
EOSINOPHIL # BLD AUTO: 0.13 K/UL (ref 0–0.51)
EOSINOPHIL NFR BLD: 1.8 % (ref 0–6.9)
ERYTHROCYTE [DISTWIDTH] IN BLOOD BY AUTOMATED COUNT: 48.8 FL (ref 35.9–50)
GLOBULIN SER CALC-MCNC: 2.7 G/DL (ref 1.9–3.5)
GLUCOSE SERPL-MCNC: 135 MG/DL (ref 65–99)
HCG SERPL QL: NEGATIVE
HCT VFR BLD AUTO: 42.8 % (ref 37–47)
HGB BLD-MCNC: 14 G/DL (ref 12–16)
IMM GRANULOCYTES # BLD AUTO: 0.02 K/UL (ref 0–0.11)
IMM GRANULOCYTES NFR BLD AUTO: 0.3 % (ref 0–0.9)
LYMPHOCYTES # BLD AUTO: 2.63 K/UL (ref 1–4.8)
LYMPHOCYTES NFR BLD: 37.4 % (ref 22–41)
MCH RBC QN AUTO: 32.5 PG (ref 27–33)
MCHC RBC AUTO-ENTMCNC: 32.7 G/DL (ref 33.6–35)
MCV RBC AUTO: 99.3 FL (ref 81.4–97.8)
MONOCYTES # BLD AUTO: 0.42 K/UL (ref 0–0.85)
MONOCYTES NFR BLD AUTO: 6 % (ref 0–13.4)
NEUTROPHILS # BLD AUTO: 3.76 K/UL (ref 2–7.15)
NEUTROPHILS NFR BLD: 53.5 % (ref 44–72)
NRBC # BLD AUTO: 0 K/UL
NRBC BLD-RTO: 0 /100 WBC
PLATELET # BLD AUTO: 223 K/UL (ref 164–446)
PMV BLD AUTO: 12.2 FL (ref 9–12.9)
POTASSIUM SERPL-SCNC: 3.9 MMOL/L (ref 3.6–5.5)
PROT SERPL-MCNC: 7.1 G/DL (ref 6–8.2)
RBC # BLD AUTO: 4.31 M/UL (ref 4.2–5.4)
SODIUM SERPL-SCNC: 134 MMOL/L (ref 135–145)
TSH SERPL DL<=0.005 MIU/L-ACNC: 1.49 UIU/ML (ref 0.38–5.33)
WBC # BLD AUTO: 7 K/UL (ref 4.8–10.8)

## 2021-01-08 PROCEDURE — 99214 OFFICE O/P EST MOD 30 MIN: CPT | Performed by: NURSE PRACTITIONER

## 2021-01-08 PROCEDURE — 84443 ASSAY THYROID STIM HORMONE: CPT

## 2021-01-08 PROCEDURE — 84703 CHORIONIC GONADOTROPIN ASSAY: CPT

## 2021-01-08 PROCEDURE — 85025 COMPLETE CBC W/AUTO DIFF WBC: CPT

## 2021-01-08 PROCEDURE — 80053 COMPREHEN METABOLIC PANEL: CPT

## 2021-01-08 PROCEDURE — 36415 COLL VENOUS BLD VENIPUNCTURE: CPT

## 2021-01-08 ASSESSMENT — ENCOUNTER SYMPTOMS: DIZZINESS: 1

## 2021-01-08 NOTE — LETTER
January 8, 2021         Patient: Annabel Bethea   YOB: 1980   Date of Visit: 1/8/2021           To Whom it May Concern:    Annabel Bethea was seen in my clinic on 1/8/2021. She may return to work 1/9/2021.    If you have any questions or concerns, please don't hesitate to call.        Sincerely,           MICHELLE HdzRROSALIA.  Electronically Signed

## 2021-01-08 NOTE — PROGRESS NOTES
Subjective:      Annabel Bethea is a 40 y.o. female who presents with Dizziness (with missed periods, stomach ache, pinching feeling in stomach, nausea and vomiting, PT has IUD)        Patient is a pleasant 40 year old female who presents with complaint of missed period, dizziness, nausea and vomiting. She reports she has not had a period in 2 months and has otherwise been very regular. She is concerned she may be pregnant despite IUD in place x 2 years. She states she has had 9 previous pregnancies. She works at [employer] and her boss was concerned with symptoms so advised her to be evaluated. She states symptoms have been present x 2 weeks.  She denies any known Covid 19 exposure.     Dizziness  This is a new problem. The current episode started 1 to 4 weeks ago. The problem occurs intermittently. The problem has been unchanged. Associated symptoms include abdominal pain, nausea and vomiting. Pertinent negatives include no anorexia, arthralgias, change in bowel habit, chest pain, chills, congestion, coughing, diaphoresis, fatigue, fever, headaches, joint swelling, myalgias, neck pain, numbness, rash, sore throat, swollen glands, urinary symptoms, vertigo, visual change or weakness. Nothing aggravates the symptoms. She has tried nothing for the symptoms. The treatment provided no relief.   Nausea  This is a new problem. The current episode started 1 to 4 weeks ago. The problem occurs intermittently. The problem has been unchanged. Associated symptoms include abdominal pain, nausea and vomiting. Pertinent negatives include no anorexia, arthralgias, change in bowel habit, chest pain, chills, congestion, coughing, diaphoresis, fatigue, fever, headaches, joint swelling, myalgias, neck pain, numbness, rash, sore throat, swollen glands, urinary symptoms, vertigo, visual change or weakness. Nothing aggravates the symptoms. She has tried nothing for the symptoms. The treatment provided no relief.       Review of Systems  "  Constitutional: Negative for chills, diaphoresis, fatigue, fever and malaise/fatigue.   HENT: Negative for congestion, ear pain, sinus pain and sore throat.    Respiratory: Negative for cough, shortness of breath and wheezing.    Cardiovascular: Negative for chest pain, palpitations, orthopnea and leg swelling.   Gastrointestinal: Positive for abdominal pain, nausea and vomiting. Negative for anorexia, change in bowel habit, constipation, diarrhea and heartburn.   Genitourinary: Negative for dysuria, flank pain, frequency, hematuria and urgency.   Musculoskeletal: Negative for arthralgias, back pain, joint pain, joint swelling, myalgias and neck pain.   Skin: Negative for rash.   Neurological: Positive for dizziness. Negative for vertigo, tingling, speech change, focal weakness, seizures, loss of consciousness, weakness, numbness and headaches.   Psychiatric/Behavioral: Negative for memory loss and suicidal ideas.   All other systems reviewed and are negative.         Objective:     /74 (BP Location: Left arm, Patient Position: Sitting, BP Cuff Size: Adult long)   Pulse 87   Temp 36.9 °C (98.5 °F) (Temporal)   Resp 20   Ht 1.702 m (5' 7\")   Wt 97.5 kg (215 lb)   SpO2 99%   BMI 33.67 kg/m²      Physical Exam  Vitals signs reviewed.   Constitutional:       Appearance: Normal appearance.   HENT:      Head: Normocephalic.      Right Ear: External ear normal.      Left Ear: External ear normal.      Nose: Nose normal. No congestion.      Mouth/Throat:      Mouth: Mucous membranes are moist.   Eyes:      Extraocular Movements: Extraocular movements intact.      Conjunctiva/sclera: Conjunctivae normal.      Pupils: Pupils are equal, round, and reactive to light.   Neck:      Musculoskeletal: Normal range of motion.   Cardiovascular:      Rate and Rhythm: Normal rate and regular rhythm.      Pulses: Normal pulses.      Heart sounds: Normal heart sounds. No murmur.   Pulmonary:      Effort: Pulmonary effort is " normal. No respiratory distress.      Breath sounds: Normal breath sounds. No wheezing, rhonchi or rales.   Abdominal:      General: Bowel sounds are normal.      Palpations: Abdomen is soft.      Tenderness: There is no abdominal tenderness. There is no right CVA tenderness, left CVA tenderness, guarding or rebound.   Musculoskeletal: Normal range of motion.   Lymphadenopathy:      Cervical: No cervical adenopathy.   Skin:     General: Skin is warm and dry.      Capillary Refill: Capillary refill takes less than 2 seconds.   Neurological:      Mental Status: She is alert and oriented to person, place, and time.   Psychiatric:         Mood and Affect: Mood normal.         Behavior: Behavior normal.            Lab Results/POC Test Results   Results for orders placed or performed in visit on 08/23/20   POCT Urinalysis   Result Value Ref Range    POC Color Orange Negative    POC Appearance Slightly Cloudy Negative    POC Leukocyte Esterase trace Negative    POC Nitrites positive Negative    POC Urobiligen 1.0 Negative (0.2) mg/dL    POC Protein negative Negative mg/dL    POC Urine PH 5.0 5.0 - 8.0    POC Blood Moderate Negative    POC Specific Gravity 1.030 <1.005 - >1.030    POC Ketones Negative Negative mg/dL    POC Bilirubin Negative Negative mg/dL    POC Glucose Negative Negative mg/dL   POCT Pregnancy   Result Value Ref Range    POC Urine Pregnancy Test Negative Negative    Internal Control Positive Positive     Internal Control Negative Negative             Assessment/Plan:        1. Irregular menses  CBC WITH DIFFERENTIAL    TSH WITH REFLEX TO FT4    Comp Metabolic Panel     HCG QUAL SERUM  -Referral to Gyn   2. Nausea  CBC WITH DIFFERENTIAL    TSH WITH REFLEX TO FT4    Comp Metabolic Panel    HCG QUAL SERUM   3. Dizziness  CBC WITH DIFFERENTIAL    TSH WITH REFLEX TO FT4    Comp Metabolic Panel    HCG QUAL SERUM   Differential diagnosis, natural history, supportive care, and indications for immediate follow-up  discussed at length.     Prior records reviewed. Patient is in no acute distress.  We will order labs for evaluation. Strict ER precautions provided.     Plan of care, medications and treatments reviewed with patient and or guardian.  Patient and or guardian voices understanding and agrees with the instructions provided. After visit summary reviewed with patient. Patient and or guardian understand the parameters for reevaluation and ER precautions discussed.     Follow up with primary care provider in the next 1-5 days for recheck as needed.  Discussed that urgent care setting has limited resources, therefore any worsening of symptoms should be evaluated in the ER. Patient and or guardian verbalized understanding.     Total 25 minutes face-to-face time spent with patient, with greater than 50% of the total time discussing patient's issues and symptoms as listed above in assessment and plan, as well as managing coordination of care for future evaluation and treatment.    Please note that this dictation was created using voice recognition software. I have made every reasonable attempt to correct obvious errors, but I expect that there are errors of grammar and possibly content that I did not discover before finalizing the note.

## 2021-01-10 ASSESSMENT — ENCOUNTER SYMPTOMS
CHILLS: 0
NUMBNESS: 0
NECK PAIN: 0
VERTIGO: 0
SPEECH CHANGE: 0
SORE THROAT: 0
HEARTBURN: 0
LOSS OF CONSCIOUSNESS: 0
TINGLING: 0
FLANK PAIN: 0
ARTHRALGIAS: 0
SHORTNESS OF BREATH: 0
WHEEZING: 0
VISUAL CHANGE: 0
ORTHOPNEA: 0
FATIGUE: 0
MEMORY LOSS: 0
WEAKNESS: 0
FEVER: 0
COUGH: 0
SINUS PAIN: 0
MYALGIAS: 0
PALPITATIONS: 0
FOCAL WEAKNESS: 0
BACK PAIN: 0
VOMITING: 1
DIARRHEA: 0
NAUSEA: 1
SWOLLEN GLANDS: 0
CHANGE IN BOWEL HABIT: 0
ANOREXIA: 0
ABDOMINAL PAIN: 1
HEADACHES: 0
CONSTIPATION: 0
DIAPHORESIS: 0
SEIZURES: 0
JOINT SWELLING: 0

## 2021-01-20 ENCOUNTER — HOSPITAL ENCOUNTER (OUTPATIENT)
Facility: MEDICAL CENTER | Age: 41
End: 2021-01-20
Attending: OBSTETRICS & GYNECOLOGY
Payer: COMMERCIAL

## 2021-01-20 ENCOUNTER — GYNECOLOGY VISIT (OUTPATIENT)
Dept: OBGYN | Facility: CLINIC | Age: 41
End: 2021-01-20
Payer: COMMERCIAL

## 2021-01-20 VITALS — DIASTOLIC BLOOD PRESSURE: 94 MMHG | WEIGHT: 218 LBS | SYSTOLIC BLOOD PRESSURE: 132 MMHG | BODY MASS INDEX: 34.14 KG/M2

## 2021-01-20 DIAGNOSIS — E66.09 CLASS 1 OBESITY DUE TO EXCESS CALORIES WITHOUT SERIOUS COMORBIDITY WITH BODY MASS INDEX (BMI) OF 34.0 TO 34.9 IN ADULT: ICD-10-CM

## 2021-01-20 DIAGNOSIS — Z30.432 ENCOUNTER FOR IUD REMOVAL: ICD-10-CM

## 2021-01-20 DIAGNOSIS — Z00.00 ANNUAL PHYSICAL EXAM: ICD-10-CM

## 2021-01-20 PROCEDURE — 99396 PREV VISIT EST AGE 40-64: CPT | Mod: 25 | Performed by: OBSTETRICS & GYNECOLOGY

## 2021-01-20 PROCEDURE — 58301 REMOVE INTRAUTERINE DEVICE: CPT | Performed by: OBSTETRICS & GYNECOLOGY

## 2021-01-20 PROCEDURE — 87624 HPV HI-RISK TYP POOLED RSLT: CPT

## 2021-01-20 PROCEDURE — 88175 CYTOPATH C/V AUTO FLUID REDO: CPT

## 2021-01-20 ASSESSMENT — FIBROSIS 4 INDEX: FIB4 SCORE: 0.92
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
## 2021-01-21 ENCOUNTER — TELEPHONE (OUTPATIENT)
Dept: SCHEDULING | Facility: IMAGING CENTER | Age: 41
End: 2021-01-21

## 2021-01-21 LAB
CYTOLOGY REG CYTOL: NORMAL
HPV HR 12 DNA CVX QL NAA+PROBE: NEGATIVE
HPV16 DNA SPEC QL NAA+PROBE: NEGATIVE
HPV18 DNA SPEC QL NAA+PROBE: NEGATIVE
SPECIMEN SOURCE: NORMAL

## 2021-01-26 ENCOUNTER — HOSPITAL ENCOUNTER (OUTPATIENT)
Dept: RADIOLOGY | Facility: MEDICAL CENTER | Age: 41
End: 2021-01-26
Attending: OBSTETRICS & GYNECOLOGY
Payer: COMMERCIAL

## 2021-01-26 DIAGNOSIS — Z00.00 ANNUAL PHYSICAL EXAM: ICD-10-CM

## 2021-01-26 PROCEDURE — 77063 BREAST TOMOSYNTHESIS BI: CPT

## 2021-02-08 PROBLEM — Z91.199 NON-COMPLIANT PATIENT: Status: RESOLVED | Noted: 2017-05-04 | Resolved: 2021-02-08

## 2021-02-08 PROBLEM — M51.36 DEGENERATION OF LUMBAR INTERVERTEBRAL DISC: Status: RESOLVED | Noted: 2017-07-21 | Resolved: 2021-02-08

## 2021-02-08 PROBLEM — Z30.014 ENCOUNTER FOR INITIAL PRESCRIPTION OF INTRAUTERINE CONTRACEPTIVE DEVICE (IUD): Status: RESOLVED | Noted: 2019-01-07 | Resolved: 2021-02-08

## 2021-02-08 PROBLEM — N89.8 VAGINAL DISCHARGE: Status: RESOLVED | Noted: 2020-06-01 | Resolved: 2021-02-08

## 2021-02-08 PROBLEM — B37.31 VAGINAL CANDIDIASIS: Status: RESOLVED | Noted: 2020-05-29 | Resolved: 2021-02-08

## 2021-02-08 PROBLEM — M54.40 CHRONIC LOW BACK PAIN WITH SCIATICA: Status: RESOLVED | Noted: 2017-04-20 | Resolved: 2021-02-08

## 2021-02-08 PROBLEM — G89.29 CHRONIC LOW BACK PAIN WITH SCIATICA: Status: RESOLVED | Noted: 2017-04-20 | Resolved: 2021-02-08

## 2021-02-08 PROBLEM — O24.414 INSULIN CONTROLLED GESTATIONAL DIABETES MELLITUS (GDM) IN THIRD TRIMESTER: Status: RESOLVED | Noted: 2017-05-04 | Resolved: 2021-02-08

## 2021-02-08 ASSESSMENT — ENCOUNTER SYMPTOMS
WEAKNESS: 0
DEPRESSION: 0
BLURRED VISION: 0
VOMITING: 0
CHILLS: 0
FEVER: 0
DIARRHEA: 0
CONSTIPATION: 0
NAUSEA: 0
PALPITATIONS: 0
SHORTNESS OF BREATH: 0

## 2021-02-08 NOTE — PROGRESS NOTES
History of Present Illness  40 year old female presents to clinic to establish care. She does have some anxiety. She has about four panic attacks per week. She is recently  from her . Mood, motivation, and concentration are okay. She did see a therapist once, but then that therapist quit. She denies any thoughts of self harm, suicide, or harm to others.     She also had a TIA in 2017, no residual effects.  She is not on any medications due to this.     She denies any other questions or concerns at this time.    ROS  Review of Systems   Constitutional: Negative for chills and fever.   HENT: Negative for hearing loss.    Eyes: Negative for blurred vision.   Respiratory: Negative for shortness of breath.    Cardiovascular: Negative for chest pain and palpitations.   Gastrointestinal: Negative for constipation, diarrhea, nausea and vomiting.   Genitourinary: Negative for dysuria and hematuria.   Skin: Negative for rash.   Neurological: Negative for weakness.   Psychiatric/Behavioral: Negative for depression. The patient is nervous/anxious.      Medications  Current Outpatient Medications   Medication Sig Dispense Refill   • rosuvastatin (CRESTOR) 20 MG Tab Take 1 Tab by mouth every evening. 90 Tab 3   • escitalopram (LEXAPRO) 10 MG Tab Take 1 Tab by mouth every day. 90 Tab 0     No current facility-administered medications for this visit.      Allergies  Allergies   Allergen Reactions   • Peanut (Diagnostic) Itching     Itchy throat     Problem List  Patient Active Problem List   Diagnosis   • Hx of TIA vs stroke 2015 without residual deficits     Past Medical History  Past Medical History:   Diagnosis Date   • Allergy    • Anxiety 2010   • Depression    • Diabetes (HCC)     GDM last two pregnancy's   • Hemiplegic migraine without status migrainosus, not intractable 9/26/2018   • Stroke (HCC)     was told by md that she had a mild stroke x 1 in 2015     Past Surgical History  Past Surgical History:  "  Procedure Laterality Date   • LUMBAR LAMINECTOMY DISKECTOMY Left 7/21/2017    Procedure: LUMBAR LAMINECTOMY DISKECTOMY - L4-5 MICRODISCECTOMY;  Surgeon: Neeraj Burrell M.D.;  Location: SURGERY [facility];  Service:    • OTHER  2009    cholecytectomy   • CHOLECYSTECTOMY       Past Family History  Family History   Problem Relation Age of Onset   • Cancer Mother         breast   • Hypertension Mother    • Diabetes Mother    • Alzheimer's Disease Maternal Grandfather    • Diabetes Paternal Grandmother    • Cancer Paternal Grandmother         breast   • Hypertension Paternal Grandmother    • Stroke Paternal Grandmother    • Other Paternal Grandmother         gout   • Arthritis Paternal Grandmother    • Alzheimer's Disease Paternal Grandfather    • No Known Problems Son    • No Known Problems Daughter    • No Known Problems Daughter    • ADD / ADHD Son    • No Known Problems Daughter    • No Known Problems Daughter    • No Known Problems Daughter      Social History  She reports eating a healthy and balanced diet, as well as getting regular exercise. She works full time as a  at [employer]. She denies any current alcohol, tobacco product, or illicit drug use. She is a former smoker, she quit in 2017, prior to that she was smoking 2 packs of cigarettes per week for a total of 2 years. She is not currently sexually active.    Physical Exam  /80 (BP Location: Left arm, Patient Position: Sitting, BP Cuff Size: Adult)   Pulse 92   Temp 36.3 °C (97.3 °F) (Temporal)   Resp 15   Ht 1.702 m (5' 7\")   Wt 97 kg (213 lb 13.5 oz)   SpO2 100%   BMI 33.49 kg/m²   Physical Exam   Constitutional: She is well-developed, well-nourished, and in no distress. No distress.   HENT:   Head: Normocephalic and atraumatic.   Right Ear: Tympanic membrane, external ear and ear canal normal.   Left Ear: Tympanic membrane, external ear and ear canal normal.   Eyes: Pupils are equal, round, and reactive to light. Right " eye exhibits no discharge. Left eye exhibits no discharge. No scleral icterus.   Neck: No thyromegaly present.   Cardiovascular: Normal rate, regular rhythm and normal heart sounds.   Pulmonary/Chest: Effort normal and breath sounds normal. No respiratory distress.   Abdominal: Soft. Bowel sounds are normal. She exhibits no distension. There is no abdominal tenderness.   Musculoskeletal:         General: No edema.   Neurological: She is alert.   Skin: Skin is warm and dry. She is not diaphoretic.   Psychiatric: Affect and judgment normal.     Assessment & Plan  1. Visit for preventive health examination  Health maintenance status reviewed and updated. We discussed diet, exercise, vaccinations, skin cancer prevention and detection, seat belt use, and regular eye and dental exams.  Flu shot is due, however is unavailable in our clinic today.    2. Generalized anxiety disorder  We will start Lexapro 10 mg daily.  She will call her previous therapist office and see if she can get in with a another provider.  - escitalopram (LEXAPRO) 10 MG Tab; Take 1 Tab by mouth every day.  Dispense: 90 Tab; Refill: 0   2/9/2021   PHQ 6   FLORIAN 3     3. Hx of TIA vs stroke 2015 without residual deficits  Chronic and stable problem.  Will add rosuvastatin 20 mg daily.  - rosuvastatin (CRESTOR) 20 MG Tab; Take 1 Tab by mouth every evening.  Dispense: 90 Tab; Refill: 3    Return in about 8 weeks (around 4/6/2021) for anxiety and weight loss management .    Betty Estrada M.D.

## 2021-02-09 ENCOUNTER — OFFICE VISIT (OUTPATIENT)
Dept: MEDICAL GROUP | Facility: MEDICAL CENTER | Age: 41
End: 2021-02-09
Payer: COMMERCIAL

## 2021-02-09 VITALS
DIASTOLIC BLOOD PRESSURE: 80 MMHG | WEIGHT: 213.85 LBS | HEART RATE: 92 BPM | BODY MASS INDEX: 33.56 KG/M2 | RESPIRATION RATE: 15 BRPM | HEIGHT: 67 IN | TEMPERATURE: 97.3 F | OXYGEN SATURATION: 100 % | SYSTOLIC BLOOD PRESSURE: 134 MMHG

## 2021-02-09 DIAGNOSIS — Z86.73 HX OF STROKE WITHOUT RESIDUAL DEFICITS: ICD-10-CM

## 2021-02-09 DIAGNOSIS — F41.1 GENERALIZED ANXIETY DISORDER: ICD-10-CM

## 2021-02-09 DIAGNOSIS — Z00.00 VISIT FOR PREVENTIVE HEALTH EXAMINATION: ICD-10-CM

## 2021-02-09 PROBLEM — G43.409 HEMIPLEGIC MIGRAINE WITHOUT STATUS MIGRAINOSUS, NOT INTRACTABLE: Status: RESOLVED | Noted: 2018-09-26 | Resolved: 2021-02-09

## 2021-02-09 PROCEDURE — 99396 PREV VISIT EST AGE 40-64: CPT | Performed by: FAMILY MEDICINE

## 2021-02-09 RX ORDER — ESCITALOPRAM OXALATE 10 MG/1
10 TABLET ORAL DAILY
Qty: 90 TAB | Refills: 0 | Status: SHIPPED | OUTPATIENT
Start: 2021-02-09

## 2021-02-09 RX ORDER — ROSUVASTATIN CALCIUM 20 MG/1
20 TABLET, COATED ORAL EVERY EVENING
Qty: 90 TAB | Refills: 3 | Status: SHIPPED | OUTPATIENT
Start: 2021-02-09

## 2021-02-09 ASSESSMENT — ANXIETY QUESTIONNAIRES
6. BECOMING EASILY ANNOYED OR IRRITABLE: NOT AT ALL
GAD7 TOTAL SCORE: 3
3. WORRYING TOO MUCH ABOUT DIFFERENT THINGS: SEVERAL DAYS
4. TROUBLE RELAXING: SEVERAL DAYS
7. FEELING AFRAID AS IF SOMETHING AWFUL MIGHT HAPPEN: NOT AT ALL
2. NOT BEING ABLE TO STOP OR CONTROL WORRYING: NOT AT ALL
1. FEELING NERVOUS, ANXIOUS, OR ON EDGE: SEVERAL DAYS
5. BEING SO RESTLESS THAT IT IS HARD TO SIT STILL: NOT AT ALL

## 2021-02-09 ASSESSMENT — PATIENT HEALTH QUESTIONNAIRE - PHQ9
5. POOR APPETITE OR OVEREATING: 0 - NOT AT ALL
SUM OF ALL RESPONSES TO PHQ QUESTIONS 1-9: 6
CLINICAL INTERPRETATION OF PHQ2 SCORE: 2

## 2021-02-09 ASSESSMENT — FIBROSIS 4 INDEX: FIB4 SCORE: 0.92

## 2021-02-09 ASSESSMENT — ENCOUNTER SYMPTOMS: NERVOUS/ANXIOUS: 1

## 2022-04-20 ENCOUNTER — HOSPITAL ENCOUNTER (EMERGENCY)
Facility: MEDICAL CENTER | Age: 42
End: 2022-04-20
Attending: EMERGENCY MEDICINE
Payer: COMMERCIAL

## 2022-04-20 VITALS
DIASTOLIC BLOOD PRESSURE: 55 MMHG | TEMPERATURE: 97 F | OXYGEN SATURATION: 99 % | WEIGHT: 205 LBS | BODY MASS INDEX: 32.18 KG/M2 | HEIGHT: 67 IN | RESPIRATION RATE: 20 BRPM | SYSTOLIC BLOOD PRESSURE: 90 MMHG | HEART RATE: 84 BPM

## 2022-04-20 DIAGNOSIS — R53.1 WEAKNESS: ICD-10-CM

## 2022-04-20 DIAGNOSIS — R55 SYNCOPE, UNSPECIFIED SYNCOPE TYPE: ICD-10-CM

## 2022-04-20 LAB
ALBUMIN SERPL BCP-MCNC: 4.5 G/DL (ref 3.2–4.9)
ALBUMIN/GLOB SERPL: 1.7 G/DL
ALP SERPL-CCNC: 89 U/L (ref 30–99)
ALT SERPL-CCNC: 24 U/L (ref 2–50)
ANION GAP SERPL CALC-SCNC: 14 MMOL/L (ref 7–16)
AST SERPL-CCNC: 25 U/L (ref 12–45)
BASOPHILS # BLD AUTO: 1.1 % (ref 0–1.8)
BASOPHILS # BLD: 0.09 K/UL (ref 0–0.12)
BILIRUB SERPL-MCNC: 0.3 MG/DL (ref 0.1–1.5)
BUN SERPL-MCNC: 11 MG/DL (ref 8–22)
CALCIUM SERPL-MCNC: 9.1 MG/DL (ref 8.5–10.5)
CHLORIDE SERPL-SCNC: 106 MMOL/L (ref 96–112)
CO2 SERPL-SCNC: 22 MMOL/L (ref 20–33)
CREAT SERPL-MCNC: 0.76 MG/DL (ref 0.5–1.4)
EKG IMPRESSION: NORMAL
EOSINOPHIL # BLD AUTO: 0.39 K/UL (ref 0–0.51)
EOSINOPHIL NFR BLD: 4.6 % (ref 0–6.9)
ERYTHROCYTE [DISTWIDTH] IN BLOOD BY AUTOMATED COUNT: 52.2 FL (ref 35.9–50)
GFR SERPLBLD CREATININE-BSD FMLA CKD-EPI: 100 ML/MIN/1.73 M 2
GLOBULIN SER CALC-MCNC: 2.6 G/DL (ref 1.9–3.5)
GLUCOSE SERPL-MCNC: 114 MG/DL (ref 65–99)
HCG SERPL QL: NEGATIVE
HCT VFR BLD AUTO: 49.9 % (ref 37–47)
HGB BLD-MCNC: 16.2 G/DL (ref 12–16)
IMM GRANULOCYTES # BLD AUTO: 0.02 K/UL (ref 0–0.11)
IMM GRANULOCYTES NFR BLD AUTO: 0.2 % (ref 0–0.9)
LYMPHOCYTES # BLD AUTO: 3.34 K/UL (ref 1–4.8)
LYMPHOCYTES NFR BLD: 39.3 % (ref 22–41)
MCH RBC QN AUTO: 32.1 PG (ref 27–33)
MCHC RBC AUTO-ENTMCNC: 32.5 G/DL (ref 33.6–35)
MCV RBC AUTO: 99 FL (ref 81.4–97.8)
MONOCYTES # BLD AUTO: 0.52 K/UL (ref 0–0.85)
MONOCYTES NFR BLD AUTO: 6.1 % (ref 0–13.4)
NEUTROPHILS # BLD AUTO: 4.14 K/UL (ref 2–7.15)
NEUTROPHILS NFR BLD: 48.7 % (ref 44–72)
NRBC # BLD AUTO: 0 K/UL
NRBC BLD-RTO: 0 /100 WBC
PLATELET # BLD AUTO: 277 K/UL (ref 164–446)
PMV BLD AUTO: 11.7 FL (ref 9–12.9)
POTASSIUM SERPL-SCNC: 4.2 MMOL/L (ref 3.6–5.5)
PROT SERPL-MCNC: 7.1 G/DL (ref 6–8.2)
RBC # BLD AUTO: 5.04 M/UL (ref 4.2–5.4)
SODIUM SERPL-SCNC: 142 MMOL/L (ref 135–145)
WBC # BLD AUTO: 8.5 K/UL (ref 4.8–10.8)

## 2022-04-20 PROCEDURE — 85025 COMPLETE CBC W/AUTO DIFF WBC: CPT

## 2022-04-20 PROCEDURE — 93005 ELECTROCARDIOGRAM TRACING: CPT | Performed by: EMERGENCY MEDICINE

## 2022-04-20 PROCEDURE — 36415 COLL VENOUS BLD VENIPUNCTURE: CPT

## 2022-04-20 PROCEDURE — 84703 CHORIONIC GONADOTROPIN ASSAY: CPT

## 2022-04-20 PROCEDURE — 99283 EMERGENCY DEPT VISIT LOW MDM: CPT

## 2022-04-20 PROCEDURE — 80053 COMPREHEN METABOLIC PANEL: CPT

## 2022-04-20 ASSESSMENT — FIBROSIS 4 INDEX: FIB4 SCORE: 0.94

## 2022-04-20 NOTE — Clinical Note
Annabel Bethea was seen and treated in our emergency department on 4/20/2022.  She may return to work on 04/22/2022.       If you have any questions or concerns, please don't hesitate to call.      Tre Flaherty M.D.

## 2022-04-20 NOTE — ED PROVIDER NOTES
ED Provider Note    ER PROVIDER NOTE        CHIEF COMPLAINT  Chief Complaint   Patient presents with   • Syncope     Pt was donating plasma today when she had a witness syncopal episode. Per EMS 160ml of blood was taken and removed. Per ems BP on arrival was 80/50. EMS gave 125cc of fluids. Pt arrives weak and fatigued. -head injury - thinners       HPI  Annable Bethea is a 41 y.o. female who presents to the emergency department complaining of a syncopal episode.  Patient reports she was in her normal state of health when she awoke this morning.  She was then donating plasma, states she was close to being done and she looked at her arm and there was a lot of bleeding around the IV site she was calling out to the staff at the donation center and then looking at the blood that she began to feel more lightheaded and passed out.  There is no witnessed seizure activity.  Patient reports no prodromal chest pain or headache.  No shortness of breath.  No nausea or vomiting or abdominal pain.  She reports now she just feels tired.  No leg pain or swelling or recent travel.  No fevers chills cough or congestion or other recent illness    REVIEW OF SYSTEMS  Pertinent positives include syncope. Pertinent negatives include no chest pain. See HPI for details. All other systems reviewed and are negative.    PAST MEDICAL HISTORY   has a past medical history of Allergy, Anxiety (2010), Depression, Diabetes (HCC), Hemiplegic migraine without status migrainosus, not intractable (9/26/2018), and Stroke ([facility]).    SURGICAL HISTORY   has a past surgical history that includes cholecystectomy; lumbar laminectomy diskectomy (Left, 7/21/2017); and other (2009).    FAMILY HISTORY  Family History   Problem Relation Age of Onset   • Cancer Mother         breast   • Hypertension Mother    • Diabetes Mother    • Alzheimer's Disease Maternal Grandfather    • Diabetes Paternal Grandmother    • Cancer Paternal Grandmother         breast   •  "Hypertension Paternal Grandmother    • Stroke Paternal Grandmother    • Other Paternal Grandmother         gout   • Arthritis Paternal Grandmother    • Alzheimer's Disease Paternal Grandfather    • No Known Problems Son    • No Known Problems Daughter    • No Known Problems Daughter    • ADD / ADHD Son    • No Known Problems Daughter    • No Known Problems Daughter    • No Known Problems Daughter        SOCIAL HISTORY  Social History     Socioeconomic History   • Marital status:    Tobacco Use   • Smoking status: Former Smoker     Packs/day: 0.25     Types: Cigarettes     Quit date: 10/1/2016     Years since quittin.5   • Smokeless tobacco: Former User     Quit date: 2016   • Tobacco comment: Pt. states last smoked when she found out she was pregnant.    Vaping Use   • Vaping Use: Never used   Substance and Sexual Activity   • Alcohol use: No   • Drug use: Yes     Types: Marijuana, Inhaled     Comment: thc   • Sexual activity: Yes     Partners: Male     Birth control/protection: I.U.D.     Comment: Mirena IUD on 19      Social History     Substance and Sexual Activity   Drug Use Yes   • Types: Marijuana, Inhaled    Comment: thc       CURRENT MEDICATIONS  Home Medications     Reviewed by Lela Kendall R.N. (Registered Nurse) on 22 at 1509  Med List Status: <None>   Medication Last Dose Status   escitalopram (LEXAPRO) 10 MG Tab  Active   rosuvastatin (CRESTOR) 20 MG Tab  Active                ALLERGIES  Allergies   Allergen Reactions   • Peanut (Diagnostic) Itching     Itchy throat       PHYSICAL EXAM  VITAL SIGNS: BP (!) 90/55   Pulse 84   Temp 36.1 °C (97 °F) (Temporal)   Resp 20   Ht 1.702 m (5' 7\")   Wt 93 kg (205 lb)   SpO2 99%   BMI 32.11 kg/m²   Pulse ox interpretation: I interpret this pulse ox as normal.    Constitutional: Alert in no apparent distress.  HENT: No signs of trauma, Bilateral external ears normal, Nose normal.   Eyes: Pupils are equal and reactive, " Conjunctiva normal, Non-icteric.   Neck: Normal range of motion, No tenderness, Supple, No stridor.   Cardiovascular: Regular rate and rhythm, no murmurs.   Thorax & Lungs: Normal breath sounds, No respiratory distress, No wheezing, No chest tenderness.   Abdomen: Bowel sounds normal, Soft, No tenderness, No masses, No pulsatile masses. No peritoneal signs.  Skin: Warm, Dry, No erythema, No rash.   Back: No bony tenderness, No CVA tenderness.   Extremities: Intact distal pulses, No edema, No tenderness, No cyanosis, Negative Matt's sign.  Musculoskeletal: Good range of motion in all major joints. No tenderness to palpation or major deformities noted.   Neurologic: Alert , Normal motor function, Normal sensory function, No focal deficits noted.   Psychiatric: Affect normal, Judgment normal, Mood normal.     DIAGNOSTIC STUDIES / PROCEDURES    Results for orders placed or performed during the hospital encounter of 04/20/22   CBC WITH DIFFERENTIAL   Result Value Ref Range    WBC 8.5 4.8 - 10.8 K/uL    RBC 5.04 4.20 - 5.40 M/uL    Hemoglobin 16.2 (H) 12.0 - 16.0 g/dL    Hematocrit 49.9 (H) 37.0 - 47.0 %    MCV 99.0 (H) 81.4 - 97.8 fL    MCH 32.1 27.0 - 33.0 pg    MCHC 32.5 (L) 33.6 - 35.0 g/dL    RDW 52.2 (H) 35.9 - 50.0 fL    Platelet Count 277 164 - 446 K/uL    MPV 11.7 9.0 - 12.9 fL    Neutrophils-Polys 48.70 44.00 - 72.00 %    Lymphocytes 39.30 22.00 - 41.00 %    Monocytes 6.10 0.00 - 13.40 %    Eosinophils 4.60 0.00 - 6.90 %    Basophils 1.10 0.00 - 1.80 %    Immature Granulocytes 0.20 0.00 - 0.90 %    Nucleated RBC 0.00 /100 WBC    Neutrophils (Absolute) 4.14 2.00 - 7.15 K/uL    Lymphs (Absolute) 3.34 1.00 - 4.80 K/uL    Monos (Absolute) 0.52 0.00 - 0.85 K/uL    Eos (Absolute) 0.39 0.00 - 0.51 K/uL    Baso (Absolute) 0.09 0.00 - 0.12 K/uL    Immature Granulocytes (abs) 0.02 0.00 - 0.11 K/uL    NRBC (Absolute) 0.00 K/uL   COMP METABOLIC PANEL   Result Value Ref Range    Sodium 142 135 - 145 mmol/L    Potassium 4.2  3.6 - 5.5 mmol/L    Chloride 106 96 - 112 mmol/L    Co2 22 20 - 33 mmol/L    Anion Gap 14.0 7.0 - 16.0    Glucose 114 (H) 65 - 99 mg/dL    Bun 11 8 - 22 mg/dL    Creatinine 0.76 0.50 - 1.40 mg/dL    Calcium 9.1 8.5 - 10.5 mg/dL    AST(SGOT) 25 12 - 45 U/L    ALT(SGPT) 24 2 - 50 U/L    Alkaline Phosphatase 89 30 - 99 U/L    Total Bilirubin 0.3 0.1 - 1.5 mg/dL    Albumin 4.5 3.2 - 4.9 g/dL    Total Protein 7.1 6.0 - 8.2 g/dL    Globulin 2.6 1.9 - 3.5 g/dL    A-G Ratio 1.7 g/dL   BETA-HCG QUALITATIVE SERUM   Result Value Ref Range    Beta-Hcg Qualitative Serum Negative Negative   ESTIMATED GFR   Result Value Ref Range    GFR (CKD-EPI) 100 >60 mL/min/1.73 m 2   EKG (Now)   Result Value Ref Range    Report       [facility] Emergency Dept.    Test Date:  2022  Pt Name:    KIMBERLI ORTIZ                Department: ER  MRN:        [id number]                      Room:       BL 21 H  Gender:     Female                       Technician: [id number]  :        1980                   Requested By:RENNY RICHARDSON  Order #:    [id number]                    Reading MD: RENNY RICHARDSON MD    Measurements  Intervals                                Axis  Rate:       76                           P:          46  DE:         124                          QRS:        63  QRSD:       82                           T:          62  QT:         368  QTc:        414    Interpretive Statements  SINUS RHYTHM  Compared to ECG 2017 14:26:14  No significant changes  Electronically Signed On 2022 16:26:57 PDT by RENNY RICHARDSON MD           RADIOLOGY  No orders to display     The radiologist's interpretation of all radiological studies have been reviewed and images independently viewed by me.    COURSE & MEDICAL DECISION MAKING  Nursing notes, VS, PMSFHx reviewed in chart.    3:25 PM Patient seen and examined at bedside.Ordered for CBC, CMP, lipase, ECG, pregnancy to evaluate her symptoms.     4:35 PM  Patient is  reevaluated, she is feeling improved, updated on results and plan for discharge to which she is agreeable      Decision Making:  This is a 41 y.o. female presenting after syncopal event.  I think this is likely vasovagal given that she was donating blood and saw a large amount of blood as well.  I have advised rest and hydration and avoidance of donating plasma in the future.  No neurologic signs/symptoms were present to suggest a neurogenic cause such as CVA/TIA. There was no witnessed seizure activity or history or residual neuro deficit to suggest seizure. The patient has no valvular abnormality on my examination to suggest valvular cause or hypertrophic cardiomyopathy. The ekg does not show any evidence of arrythmia, prolonged intervals, early excitation, or other abnormality such as an accessory pathway, qt prolongation, or brugada syndrome. ACS or MI are unlikely causes given nature of sx, unremarkable ECG and given the patients improvement the likely of acs of mi is very low. Other cardiac causes are also unlikely based on my history and physical examination.  Serious bacterial illness was considered but ruled out by history and physical exam.   There is no evidence of metabolic abnormalities to explain this episode of syncope on labs.  As the patient is now improved there is no evidence of emergent toxic, metabolic, or endocrine abnormalities.  She is not pregnant  She is not anemic   The patient will return for new or worsening symptoms and is stable at the time of discharge.    The patient is referred to a primary physician for blood pressure management, diabetic screening, and for all other preventative health concerns.      DISPOSITION:  Patient will be discharged home in stable condition.    FOLLOW UP:  Betty Estrada M.D.  [address] [facility] [address]  [facility] [address]  [phone]    In 1 week        OUTPATIENT MEDICATIONS:  Discharge Medication List as of 4/20/2022  4:28 PM            FINAL  IMPRESSION  1. Syncope, unspecified syncope type    2. Weakness         The note accurately reflects work and decisions made by me.  Tre Flaherty M.D.  4/20/2022  4:47 PM

## 2022-04-20 NOTE — ED TRIAGE NOTES
"Chief Complaint   Patient presents with   • Syncope     Pt was donating plasma today when she had a witness syncopal episode. Per EMS 160ml of blood was taken and removed. Per ems BP on arrival was 80/50. EMS gave 125cc of fluids. Pt arrives weak and fatigued. -head injury - thinners     Pt bib ems for above complaint.    BP (!) 95/56   Pulse (!) 59   Temp 36.1 °C (96.9 °F) (Temporal)   Resp 20   Ht 1.702 m (5' 7\")   Wt 93 kg (205 lb)   SpO2 100%   BMI 32.11 kg/m²     "

## 2022-04-20 NOTE — ED NOTES
Pt provided with discharge instructions. Pt verbalized understanding. PIV removed and pt assisted out of ed with steady gait.

## 2022-07-14 ENCOUNTER — HOSPITAL ENCOUNTER (EMERGENCY)
Facility: MEDICAL CENTER | Age: 42
End: 2022-07-14
Attending: EMERGENCY MEDICINE
Payer: MEDICAID

## 2022-07-14 VITALS
DIASTOLIC BLOOD PRESSURE: 86 MMHG | HEIGHT: 67 IN | RESPIRATION RATE: 22 BRPM | HEART RATE: 102 BPM | TEMPERATURE: 98.2 F | OXYGEN SATURATION: 98 % | WEIGHT: 192.46 LBS | BODY MASS INDEX: 30.21 KG/M2 | SYSTOLIC BLOOD PRESSURE: 145 MMHG

## 2022-07-14 DIAGNOSIS — U07.1 COVID-19: ICD-10-CM

## 2022-07-14 LAB
ALBUMIN SERPL BCP-MCNC: 4.6 G/DL (ref 3.2–4.9)
ALBUMIN/GLOB SERPL: 1.5 G/DL
ALP SERPL-CCNC: 72 U/L (ref 30–99)
ALT SERPL-CCNC: 13 U/L (ref 2–50)
ANION GAP SERPL CALC-SCNC: 13 MMOL/L (ref 7–16)
APPEARANCE UR: ABNORMAL
AST SERPL-CCNC: 16 U/L (ref 12–45)
BACTERIA #/AREA URNS HPF: ABNORMAL /HPF
BASOPHILS # BLD AUTO: 1.1 % (ref 0–1.8)
BASOPHILS # BLD: 0.06 K/UL (ref 0–0.12)
BILIRUB SERPL-MCNC: 0.3 MG/DL (ref 0.1–1.5)
BILIRUB UR QL STRIP.AUTO: NEGATIVE
BUN SERPL-MCNC: 8 MG/DL (ref 8–22)
CALCIUM SERPL-MCNC: 9.4 MG/DL (ref 8.5–10.5)
CHLORIDE SERPL-SCNC: 104 MMOL/L (ref 96–112)
CO2 SERPL-SCNC: 20 MMOL/L (ref 20–33)
COLOR UR: ABNORMAL
CREAT SERPL-MCNC: 0.76 MG/DL (ref 0.5–1.4)
EOSINOPHIL # BLD AUTO: 0.09 K/UL (ref 0–0.51)
EOSINOPHIL NFR BLD: 1.6 % (ref 0–6.9)
EPI CELLS #/AREA URNS HPF: ABNORMAL /HPF
ERYTHROCYTE [DISTWIDTH] IN BLOOD BY AUTOMATED COUNT: 48.6 FL (ref 35.9–50)
FLUAV RNA SPEC QL NAA+PROBE: NEGATIVE
FLUBV RNA SPEC QL NAA+PROBE: NEGATIVE
GFR SERPLBLD CREATININE-BSD FMLA CKD-EPI: 100 ML/MIN/1.73 M 2
GLOBULIN SER CALC-MCNC: 3 G/DL (ref 1.9–3.5)
GLUCOSE SERPL-MCNC: 113 MG/DL (ref 65–99)
GLUCOSE UR STRIP.AUTO-MCNC: NEGATIVE MG/DL
HCG SERPL QL: NEGATIVE
HCT VFR BLD AUTO: 41.6 % (ref 37–47)
HGB BLD-MCNC: 14.2 G/DL (ref 12–16)
IMM GRANULOCYTES # BLD AUTO: 0.01 K/UL (ref 0–0.11)
IMM GRANULOCYTES NFR BLD AUTO: 0.2 % (ref 0–0.9)
KETONES UR STRIP.AUTO-MCNC: 15 MG/DL
LEUKOCYTE ESTERASE UR QL STRIP.AUTO: ABNORMAL
LIPASE SERPL-CCNC: 29 U/L (ref 11–82)
LYMPHOCYTES # BLD AUTO: 0.85 K/UL (ref 1–4.8)
LYMPHOCYTES NFR BLD: 15 % (ref 22–41)
MCH RBC QN AUTO: 31.5 PG (ref 27–33)
MCHC RBC AUTO-ENTMCNC: 34.1 G/DL (ref 33.6–35)
MCV RBC AUTO: 92.2 FL (ref 81.4–97.8)
MICRO URNS: ABNORMAL
MONOCYTES # BLD AUTO: 0.87 K/UL (ref 0–0.85)
MONOCYTES NFR BLD AUTO: 15.3 % (ref 0–13.4)
NEUTROPHILS # BLD AUTO: 3.8 K/UL (ref 2–7.15)
NEUTROPHILS NFR BLD: 66.8 % (ref 44–72)
NITRITE UR QL STRIP.AUTO: NEGATIVE
NRBC # BLD AUTO: 0 K/UL
NRBC BLD-RTO: 0 /100 WBC
PH UR STRIP.AUTO: 6 [PH] (ref 5–8)
PLATELET # BLD AUTO: 217 K/UL (ref 164–446)
PMV BLD AUTO: 12.3 FL (ref 9–12.9)
POTASSIUM SERPL-SCNC: 3.7 MMOL/L (ref 3.6–5.5)
PROT SERPL-MCNC: 7.6 G/DL (ref 6–8.2)
PROT UR QL STRIP: 30 MG/DL
RBC # BLD AUTO: 4.51 M/UL (ref 4.2–5.4)
RBC # URNS HPF: ABNORMAL /HPF
RBC UR QL AUTO: ABNORMAL
RSV RNA SPEC QL NAA+PROBE: NEGATIVE
SARS-COV-2 RNA RESP QL NAA+PROBE: DETECTED
SODIUM SERPL-SCNC: 137 MMOL/L (ref 135–145)
SP GR UR STRIP.AUTO: 1.04
SPECIMEN SOURCE: ABNORMAL
UROBILINOGEN UR STRIP.AUTO-MCNC: 1 MG/DL
WBC # BLD AUTO: 5.7 K/UL (ref 4.8–10.8)
WBC #/AREA URNS HPF: ABNORMAL /HPF

## 2022-07-14 PROCEDURE — 99284 EMERGENCY DEPT VISIT MOD MDM: CPT

## 2022-07-14 PROCEDURE — 700111 HCHG RX REV CODE 636 W/ 250 OVERRIDE (IP): Performed by: EMERGENCY MEDICINE

## 2022-07-14 PROCEDURE — 87086 URINE CULTURE/COLONY COUNT: CPT

## 2022-07-14 PROCEDURE — 83690 ASSAY OF LIPASE: CPT

## 2022-07-14 PROCEDURE — 0241U HCHG SARS-COV-2 COVID-19 NFCT DS RESP RNA 4 TRGT MIC: CPT

## 2022-07-14 PROCEDURE — C9803 HOPD COVID-19 SPEC COLLECT: HCPCS | Performed by: EMERGENCY MEDICINE

## 2022-07-14 PROCEDURE — 84703 CHORIONIC GONADOTROPIN ASSAY: CPT

## 2022-07-14 PROCEDURE — 96374 THER/PROPH/DIAG INJ IV PUSH: CPT

## 2022-07-14 PROCEDURE — 85025 COMPLETE CBC W/AUTO DIFF WBC: CPT

## 2022-07-14 PROCEDURE — 36415 COLL VENOUS BLD VENIPUNCTURE: CPT

## 2022-07-14 PROCEDURE — 81001 URINALYSIS AUTO W/SCOPE: CPT

## 2022-07-14 PROCEDURE — 80053 COMPREHEN METABOLIC PANEL: CPT

## 2022-07-14 RX ORDER — KETOROLAC TROMETHAMINE 30 MG/ML
30 INJECTION, SOLUTION INTRAMUSCULAR; INTRAVENOUS ONCE
Status: COMPLETED | OUTPATIENT
Start: 2022-07-14 | End: 2022-07-14

## 2022-07-14 RX ADMIN — KETOROLAC TROMETHAMINE 30 MG: 30 INJECTION, SOLUTION INTRAMUSCULAR; INTRAVENOUS at 18:21

## 2022-07-14 ASSESSMENT — FIBROSIS 4 INDEX: FIB4 SCORE: 0.77

## 2022-07-14 ASSESSMENT — PAIN DESCRIPTION - PAIN TYPE: TYPE: ACUTE PAIN
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
## 2022-07-15 NOTE — ED NOTES
Pt found lying on floor in lobby, able to ambulate back to room independently with steady gait. Agree with triage note. Pt a/o x4, speaking in full sentences.

## 2022-07-16 LAB
BACTERIA UR CULT: ABNORMAL
BACTERIA UR CULT: ABNORMAL
SIGNIFICANT IND 70042: ABNORMAL
SITE SITE: ABNORMAL
SOURCE SOURCE: ABNORMAL

## 2022-07-19 NOTE — ED NOTES
"ED Positive Culture Follow-up/Notification Note:    Date: 7/18/22     Patient seen in the ED on 7/14/2022 for chills abdominal pain, nausea, and body aches. PCR positive for COVID (noted by ED physician prior to discharge). No dysuria or suprapubic tenderness was noted.       1. COVID-19         Allergies: Peanut (diagnostic)     Vitals:    07/14/22 1711 07/14/22 1827 07/14/22 1900 07/14/22 2017   BP:  (!) 151/96 (!) 151/96 (!) 145/86   Pulse:  (!) 103 (!) 105 (!) 102   Resp:  18 20 (!) 22   Temp:   36.8 °C (98.2 °F)    TempSrc:   Temporal    SpO2:  97% 97% 98%   Weight: 87.3 kg (192 lb 7.4 oz)      Height:           Final cultures:   Results     Procedure Component Value Units Date/Time    URINE CULTURE(NEW) [[id number]]  (Abnormal) Collected: 07/14/22 1857    Order Status: Completed Specimen: Urine Updated: 07/16/22 2101     Significant Indicator POS     Source UR     Site -     Culture Result Usual urogenital mariola ,000 cfu/mL      Streptococcus agalactiae (Group B)  >100,000 cfu/mL      Narrative:      Indication for culture:->Patient WITHOUT an indwelling Anderson  catheter in place with new onset of Dysuria, Frequency,  Urgency, and/or Suprapubic pain    CoV-2, FLU A/B, and RSV by PCR (2-4 Hours CEPHEID) : Collect NP swab in VTM [[id number]]  (Abnormal) Collected: 07/14/22 1824    Order Status: Completed Specimen: Respirate Updated: 07/14/22 1926     Influenza virus A RNA Negative     Influenza virus B, PCR Negative     RSV, PCR Negative     SARS-CoV-2 by PCR DETECTED     Comment: PATIENTS: Important information regarding your results and instructions can  be found at [email or web address]   \"After your  Covid-19 Test\"    **The [employer] GeneXpert Xpress SARS-CoV-2 RT-PCR Test has been made  available for use under the Emergency Use Authorization (EUA) only.          SARS-CoV-2 Source NP Swab    URINALYSIS,CULTURE IF INDICATED [[id number]]  (Abnormal) Collected: 07/14/22 1857    Order " Status: Completed Specimen: Urine Updated: 07/14/22 1917     Color DK Yellow     Character Cloudy     Specific Gravity 1.037     Ph 6.0     Glucose Negative mg/dL      Ketones 15 mg/dL      Protein 30 mg/dL      Bilirubin Negative     Urobilinogen, Urine 1.0     Nitrite Negative     Leukocyte Esterase Small     Occult Blood Moderate     Micro Urine Req Microscopic    Narrative:      Indication for culture:->Patient WITHOUT an indwelling Anderson  catheter in place with new onset of Dysuria, Frequency,  Urgency, and/or Suprapubic pain          Plan:   - Group B Strep identified on culture; however, this is likely a component of the mixed skin mariola also identified.    - UA with many epithelials indicating contamination.  - No treatment required based on these results.     Roxie Collins, PharmD

## 2024-05-21 NOTE — PATIENT INSTRUCTIONS
I am concerned about perinephritic abscess due to your flank pain and abdominal pain  You received a dose of Rocephin 1gram intramuscular and also Toradol 30mg intramuscular from us  Please go to [facility] emergency department for further evaluation       Ambulatory
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
## (undated) DEVICE — SUCTION INSTRUMENT YANKAUER BULBOUS TIP W/O VENT (50EA/CA)

## (undated) DEVICE — MASK ANESTHESIA ADULT  - (100/CA)

## (undated) DEVICE — GOWN SURGEONS X-LARGE - DISP. (30/CA)

## (undated) DEVICE — MIDAS LUBRICATOR DIFFUSER PACK (4EA/CA)

## (undated) DEVICE — PACK NEURO - (2EA/CA)

## (undated) DEVICE — SUTURE 0 VICRYL PLUS CT-1 - 8 X 18 INCH (12/BX)

## (undated) DEVICE — GLOVE BIOGEL SZ 7 SURGICAL PF LTX - (50PR/BX 4BX/CA)

## (undated) DEVICE — GLOVE BIOGEL SZ 6.5 SURGICAL PF LTX (50PR/BX 4BX/CA)

## (undated) DEVICE — CHLORAPREP 26 ML APPLICATOR - ORANGE TINT(25/CA)

## (undated) DEVICE — PAD BABY LAP 4X18 W/O - RINGS PREWASHED 5/PK 40PK/CS

## (undated) DEVICE — ELECTRODE DUAL RETURN W/ CORD - (50/PK)

## (undated) DEVICE — TUBING CLEARLINK DUO-VENT - C-FLO (48EA/CA)

## (undated) DEVICE — TUBE E-T HI-LO CUFF 7.5MM (10EA/PK)

## (undated) DEVICE — GLOVE BIOGEL PI INDICATOR SZ 8.0 SURGICAL PF LF -(50/BX 4BX/CA)

## (undated) DEVICE — GLOVE SZ 8 BIOGEL PI MICRO - PF LF (50PR/BX)

## (undated) DEVICE — DRAPE LAPAROTOMY T SHEET - (12EA/CA)

## (undated) DEVICE — ELECTRODE 850 FOAM ADHESIVE - HYDROGEL RADIOTRNSPRNT (50/PK)

## (undated) DEVICE — KIT ANESTHESIA W/CIRCUIT & 3/LT BAG W/FILTER (20EA/CA)

## (undated) DEVICE — DRAPE LARGE 3 QUARTER - (20/CA)

## (undated) DEVICE — CANISTER SUCTION 3000ML MECHANICAL FILTER AUTO SHUTOFF MEDI-VAC NONSTERILE LF DISP  (40EA/CA)

## (undated) DEVICE — SET EXTENSION WITH 2 PORTS (48EA/CA) ***PART #2C8610 IS A SUBSTITUTE*****

## (undated) DEVICE — GLOVE BIOGEL PI INDICATOR SZ 7.0 SURGICAL PF LF - (50/BX 4BX/CA)

## (undated) DEVICE — KIT SURGIFLO W/OUT THROMBIN - (6EA/CA)

## (undated) DEVICE — PROTECTOR ULNA NERVE - (36PR/CA)

## (undated) DEVICE — TUBING C&T SET FLYING LEADS DRAIN TUBING (10EA/BX)

## (undated) DEVICE — SPONGE GAUZESTER 4 X 4 4PLY - (128PK/CA)

## (undated) DEVICE — ARMREST CRADLE FOAM - (2PR/PK 12PR/CA)

## (undated) DEVICE — LACTATED RINGERS INJ 1000 ML - (14EA/CA 60CA/PF)

## (undated) DEVICE — SUTURE 2-0 VICRYL PLUS CT-1 - 8 X 18 INCH(12/BX)

## (undated) DEVICE — DETERGENT RENUZYME PLUS 10 OZ PACKET (50/BX)

## (undated) DEVICE — SYRINGE SAFETY 10 ML 18 GA X 1 1/2 BLUNT LL (100/BX 4BX/CA)

## (undated) DEVICE — LIGHT SOURCE MIS 12FT

## (undated) DEVICE — PACK JACKSON TABLE KIT W/OUT - HR (6EA/CA)

## (undated) DEVICE — SENSOR SPO2 NEO LNCS ADHESIVE (20/BX) SEE USER NOTES

## (undated) DEVICE — GLOVE BIOGEL INDICATOR SZ 7.5 SURGICAL PF LTX - (50PR/BX 4BX/CA)

## (undated) DEVICE — SUTURE GENERAL

## (undated) DEVICE — LACTATED RINGERS INJ. 500 ML - (24EA/CA)

## (undated) DEVICE — HEADREST PRONEVIEW LARGE - (10/CA)

## (undated) DEVICE — SET LEADWIRE 5 LEAD BEDSIDE DISPOSABLE ECG (1SET OF 5/EA)

## (undated) DEVICE — GOWN WARMING STANDARD FLEX - (30/CA)

## (undated) DEVICE — TOOL DISSECT MATCH HEAD

## (undated) DEVICE — DRAPE MICROSCOPE X-LONG (10EA/CA)

## (undated) DEVICE — KIT ROOM DECONTAMINATION

## (undated) DEVICE — SODIUM CHL IRRIGATION 0.9% 1000ML (12EA/CA)

## (undated) DEVICE — LEAD SET 6 DISP. EKG NIHON KOHDEN (100EA/CA) [9859].

## (undated) DEVICE — NEPTUNE 4 PORT MANIFOLD - (20/PK)